# Patient Record
Sex: FEMALE | Race: WHITE | NOT HISPANIC OR LATINO | Employment: OTHER
[De-identification: names, ages, dates, MRNs, and addresses within clinical notes are randomized per-mention and may not be internally consistent; named-entity substitution may affect disease eponyms.]

---

## 2017-08-28 ENCOUNTER — PRIOR ORIGINAL RECORDS (OUTPATIENT)
Dept: OTHER | Age: 72
End: 2017-08-28

## 2017-08-28 PROBLEM — K21.9 GASTROESOPHAGEAL REFLUX DISEASE WITHOUT ESOPHAGITIS: Status: ACTIVE | Noted: 2017-08-28

## 2017-08-28 PROCEDURE — 81003 URINALYSIS AUTO W/O SCOPE: CPT | Performed by: INTERNAL MEDICINE

## 2017-11-03 ENCOUNTER — PRIOR ORIGINAL RECORDS (OUTPATIENT)
Dept: OTHER | Age: 72
End: 2017-11-03

## 2017-11-03 ENCOUNTER — MYAURORA ACCOUNT LINK (OUTPATIENT)
Dept: OTHER | Age: 72
End: 2017-11-03

## 2017-11-03 ENCOUNTER — HOSPITAL ENCOUNTER (OUTPATIENT)
Dept: LAB | Facility: HOSPITAL | Age: 72
Discharge: HOME | End: 2017-11-03
Attending: INTERNAL MEDICINE
Payer: MEDICARE

## 2017-11-03 PROCEDURE — 84520 ASSAY OF UREA NITROGEN: CPT | Performed by: INTERNAL MEDICINE

## 2017-11-03 PROCEDURE — 82565 ASSAY OF CREATININE: CPT | Performed by: INTERNAL MEDICINE

## 2017-11-03 PROCEDURE — 36415 COLL VENOUS BLD VENIPUNCTURE: CPT | Performed by: INTERNAL MEDICINE

## 2017-11-06 LAB
BUN: 19 MG/DL
CREATININE, SERUM: 0.9 MG/DL

## 2017-11-21 ENCOUNTER — MYAURORA ACCOUNT LINK (OUTPATIENT)
Dept: OTHER | Age: 72
End: 2017-11-21

## 2017-11-21 ENCOUNTER — HOSPITAL ENCOUNTER (OUTPATIENT)
Dept: CT IMAGING | Facility: HOSPITAL | Age: 72
Discharge: HOME | End: 2017-11-21
Attending: INTERNAL MEDICINE
Payer: MEDICARE

## 2017-11-21 ENCOUNTER — HOSPITAL ENCOUNTER (OUTPATIENT)
Dept: CV DIAGNOSTICS | Facility: HOSPITAL | Age: 72
Discharge: HOME | End: 2017-11-21
Attending: INTERNAL MEDICINE

## 2017-11-21 ENCOUNTER — PRIOR ORIGINAL RECORDS (OUTPATIENT)
Dept: OTHER | Age: 72
End: 2017-11-21

## 2017-11-21 DIAGNOSIS — R06.00 DYSPNEA: ICD-10-CM

## 2017-11-21 DIAGNOSIS — R07.2 CHEST PAIN, PRECORDIAL: ICD-10-CM

## 2017-11-21 DIAGNOSIS — R07.9 CHEST PAIN: ICD-10-CM

## 2017-11-21 DIAGNOSIS — R94.39 ABNORMAL STRESS TEST: ICD-10-CM

## 2017-11-21 DIAGNOSIS — R00.2 PALPITATIONS: ICD-10-CM

## 2017-11-21 DIAGNOSIS — R06.00 DYSPNEA, UNSPECIFIED TYPE: ICD-10-CM

## 2017-11-21 PROCEDURE — 75574 CT ANGIO HRT W/3D IMAGE: CPT | Performed by: INTERNAL MEDICINE

## 2017-11-21 RX ORDER — NITROGLYCERIN 0.4 MG/1
TABLET SUBLINGUAL
Status: COMPLETED
Start: 2017-11-21 | End: 2017-11-21

## 2017-11-21 RX ADMIN — NITROGLYCERIN 0.4 MG: 0.4 TABLET SUBLINGUAL at 13:58:00

## 2017-11-22 LAB
ALBUMIN: 4.2 G/DL
ALKALINE PHOSPHATATE(ALK PHOS): 55 IU/L
BILIRUBIN TOTAL: 0.91 MG/DL
BUN: 15 MG/DL
CALCIUM: 9.7 MG/DL
CHLORIDE: 98 MEQ/L
CHOLESTEROL, TOTAL: 205 MG/DL
CREATININE KINASE: 87 U/L
CREATININE, SERUM: 0.83 MG/DL
GLUCOSE: 92 MG/DL
HDL CHOLESTEROL: 57 MG/DL
HEMATOCRIT: 41.7 %
HEMOGLOBIN: 13.3 G/DL
LDL CHOLESTEROL: 136 MG/DL
PLATELETS: 256 K/UL
POTASSIUM, SERUM: 4.6 MEQ/L
PROTEIN, TOTAL: 6.8 G/DL
RED BLOOD COUNT: 4.29 X 10-6/U
SGOT (AST): 21 IU/L
SGPT (ALT): 23 IU/L
SODIUM: 135 MEQ/L
THYROID STIMULATING HORMONE: 1.58 MLU/L
TRIGLYCERIDES: 60 MG/DL
WHITE BLOOD COUNT: 6.23 X 10-3/U

## 2017-11-28 ENCOUNTER — PRIOR ORIGINAL RECORDS (OUTPATIENT)
Dept: OTHER | Age: 72
End: 2017-11-28

## 2017-11-28 LAB — UFCT: 74 CA SCORE

## 2017-12-01 ENCOUNTER — PRIOR ORIGINAL RECORDS (OUTPATIENT)
Dept: OTHER | Age: 72
End: 2017-12-01

## 2017-12-21 ENCOUNTER — PRIOR ORIGINAL RECORDS (OUTPATIENT)
Dept: OTHER | Age: 72
End: 2017-12-21

## 2017-12-28 ENCOUNTER — PRIOR ORIGINAL RECORDS (OUTPATIENT)
Dept: OTHER | Age: 72
End: 2017-12-28

## 2018-05-24 ENCOUNTER — MYAURORA ACCOUNT LINK (OUTPATIENT)
Dept: OTHER | Age: 73
End: 2018-05-24

## 2018-05-24 ENCOUNTER — PRIOR ORIGINAL RECORDS (OUTPATIENT)
Dept: OTHER | Age: 73
End: 2018-05-24

## 2018-05-29 LAB
ALBUMIN: 4.5 G/DL
ALKALINE PHOSPHATATE(ALK PHOS): 62 IU/L
ALT (SGPT): 27 U/L
AST (SGOT): 23 U/L
BILIRUBIN TOTAL: 0.73 MG/DL
BUN: 18 MG/DL
CALCIUM: 9.7 MG/DL
CHLORIDE: 95 MEQ/L
CHOLESTEROL, TOTAL: 175 MG/DL
CREATININE, SERUM: 0.94 MG/DL
GLUCOSE: 89 MG/DL
HDL CHOLESTEROL: 56 MG/DL
LDL CHOLESTEROL: 105 MG/DL
POTASSIUM, SERUM: 4.3 MEQ/L
POTASSIUM, SERUM: 5.6 MEQ/L
PROTEIN, TOTAL: 7.3 G/DL
SODIUM: 134 MEQ/L
TRIGLYCERIDES: 68 MG/DL

## 2018-08-29 PROBLEM — I51.89 DIASTOLIC DYSFUNCTION: Status: ACTIVE | Noted: 2018-08-29

## 2018-08-29 PROCEDURE — 81003 URINALYSIS AUTO W/O SCOPE: CPT | Performed by: INTERNAL MEDICINE

## 2019-02-28 VITALS
RESPIRATION RATE: 16 BRPM | BODY MASS INDEX: 24.66 KG/M2 | HEIGHT: 62 IN | HEART RATE: 68 BPM | SYSTOLIC BLOOD PRESSURE: 128 MMHG | WEIGHT: 134 LBS | DIASTOLIC BLOOD PRESSURE: 80 MMHG

## 2019-02-28 VITALS
HEART RATE: 69 BPM | WEIGHT: 138 LBS | HEIGHT: 62 IN | BODY MASS INDEX: 25.4 KG/M2 | DIASTOLIC BLOOD PRESSURE: 78 MMHG | SYSTOLIC BLOOD PRESSURE: 138 MMHG

## 2019-03-29 RX ORDER — METOPROLOL SUCCINATE 50 MG/1
50 TABLET, EXTENDED RELEASE ORAL DAILY
COMMUNITY
Start: 2017-03-11

## 2019-03-29 RX ORDER — FUROSEMIDE 20 MG/1
20 TABLET ORAL
COMMUNITY
Start: 2018-05-24

## 2019-03-29 RX ORDER — LISINOPRIL 20 MG/1
20 TABLET ORAL DAILY
COMMUNITY
Start: 2017-03-11

## 2019-03-29 RX ORDER — OMEPRAZOLE 20 MG/1
1 CAPSULE, DELAYED RELEASE ORAL DAILY PRN
COMMUNITY
Start: 2017-03-11 | End: 2021-12-13

## 2019-03-29 RX ORDER — LOVASTATIN 10 MG/1
2 TABLET ORAL DAILY
COMMUNITY
Start: 2017-03-11 | End: 2020-05-27 | Stop reason: CLARIF

## 2019-05-23 ENCOUNTER — ANCILLARY PROCEDURE (OUTPATIENT)
Dept: CARDIOLOGY | Age: 74
End: 2019-05-23
Attending: INTERNAL MEDICINE

## 2019-05-23 DIAGNOSIS — R06.00 DYSPNEA: ICD-10-CM

## 2019-05-23 PROBLEM — R94.39 ABNORMAL CARDIOVASCULAR STRESS TEST: Status: ACTIVE | Noted: 2017-11-03

## 2019-05-23 PROBLEM — I10 ESSENTIAL HYPERTENSION: Status: ACTIVE | Noted: 2017-11-03

## 2019-05-23 PROBLEM — I50.32 CHRONIC CONGESTIVE HEART FAILURE WITH LEFT VENTRICULAR DIASTOLIC DYSFUNCTION (CMD): Status: ACTIVE | Noted: 2018-05-24

## 2019-05-23 PROBLEM — E78.2 HYPERLIPIDEMIA, MIXED: Status: ACTIVE | Noted: 2017-11-03

## 2019-05-23 PROBLEM — I27.20 PULMONARY HTN (CMD): Status: ACTIVE | Noted: 2018-05-24

## 2019-05-23 PROBLEM — R07.2 CHEST PAIN, PRECORDIAL: Status: ACTIVE | Noted: 2017-11-03

## 2019-05-23 PROBLEM — I25.10 CAD IN NATIVE ARTERY: Status: ACTIVE | Noted: 2018-05-24

## 2019-05-23 PROBLEM — Z82.49 FAMILY HISTORY OF CORONARY ARTERIOSCLEROSIS: Status: ACTIVE | Noted: 2017-11-03

## 2019-05-23 PROBLEM — R00.2 PALPITATIONS: Status: ACTIVE | Noted: 2017-11-03

## 2019-05-23 PROCEDURE — 93306 TTE W/DOPPLER COMPLETE: CPT | Performed by: INTERNAL MEDICINE

## 2019-05-23 ASSESSMENT — ENCOUNTER SYMPTOMS
HEMOPTYSIS: 0
HEMATOCHEZIA: 0
WEIGHT LOSS: 0
SUSPICIOUS LESIONS: 0
WEIGHT GAIN: 0
ALLERGIC/IMMUNOLOGIC COMMENTS: NO NEW FOOD ALLERGIES
BRUISES/BLEEDS EASILY: 0
FEVER: 0
COUGH: 0
CHILLS: 0

## 2019-05-24 ENCOUNTER — OFFICE VISIT (OUTPATIENT)
Dept: CARDIOLOGY | Age: 74
End: 2019-05-24

## 2019-05-24 VITALS
HEIGHT: 61 IN | HEART RATE: 62 BPM | BODY MASS INDEX: 26.43 KG/M2 | SYSTOLIC BLOOD PRESSURE: 132 MMHG | DIASTOLIC BLOOD PRESSURE: 74 MMHG | WEIGHT: 140 LBS

## 2019-05-24 DIAGNOSIS — I25.10 CAD IN NATIVE ARTERY: ICD-10-CM

## 2019-05-24 DIAGNOSIS — I10 ESSENTIAL HYPERTENSION: ICD-10-CM

## 2019-05-24 DIAGNOSIS — I50.32 CHRONIC CONGESTIVE HEART FAILURE WITH LEFT VENTRICULAR DIASTOLIC DYSFUNCTION (CMD): ICD-10-CM

## 2019-05-24 DIAGNOSIS — R07.2 CHEST PAIN, PRECORDIAL: ICD-10-CM

## 2019-05-24 DIAGNOSIS — E78.2 HYPERLIPIDEMIA, MIXED: ICD-10-CM

## 2019-05-24 DIAGNOSIS — I27.20 PULMONARY HTN (CMD): Primary | ICD-10-CM

## 2019-05-24 PROCEDURE — 99214 OFFICE O/P EST MOD 30 MIN: CPT | Performed by: INTERNAL MEDICINE

## 2019-05-24 SDOH — HEALTH STABILITY: PHYSICAL HEALTH: ON AVERAGE, HOW MANY DAYS PER WEEK DO YOU ENGAGE IN MODERATE TO STRENUOUS EXERCISE (LIKE A BRISK WALK)?: 7 DAYS

## 2019-05-24 SDOH — HEALTH STABILITY: PHYSICAL HEALTH: ON AVERAGE, HOW MANY MINUTES DO YOU ENGAGE IN EXERCISE AT THIS LEVEL?: 60 MIN

## 2019-05-24 SDOH — HEALTH STABILITY: MENTAL HEALTH
STRESS IS WHEN SOMEONE FEELS TENSE, NERVOUS, ANXIOUS, OR CAN'T SLEEP AT NIGHT BECAUSE THEIR MIND IS TROUBLED. HOW STRESSED ARE YOU?: NOT AT ALL

## 2019-05-24 ASSESSMENT — ENCOUNTER SYMPTOMS: HEARTBURN: 1

## 2019-08-30 PROBLEM — R94.39 ABNORMAL CARDIOVASCULAR STRESS TEST: Status: ACTIVE | Noted: 2017-11-03

## 2019-08-30 PROBLEM — I50.32 CHRONIC CONGESTIVE HEART FAILURE WITH LEFT VENTRICULAR DIASTOLIC DYSFUNCTION (HCC): Status: ACTIVE | Noted: 2018-05-24

## 2019-08-30 PROBLEM — I27.20 PULMONARY HYPERTENSION (HCC): Status: ACTIVE | Noted: 2017-11-30

## 2019-08-30 PROBLEM — I25.10 CAD IN NATIVE ARTERY: Status: ACTIVE | Noted: 2018-05-24

## 2019-08-30 PROCEDURE — 81003 URINALYSIS AUTO W/O SCOPE: CPT | Performed by: INTERNAL MEDICINE

## 2020-05-12 ENCOUNTER — E-ADVICE (OUTPATIENT)
Dept: CARDIOLOGY | Age: 75
End: 2020-05-12

## 2020-05-12 ENCOUNTER — TELEPHONE (OUTPATIENT)
Dept: CARDIOLOGY | Age: 75
End: 2020-05-12

## 2020-05-26 ENCOUNTER — APPOINTMENT (OUTPATIENT)
Dept: CARDIOLOGY | Age: 75
End: 2020-05-26

## 2020-05-27 ENCOUNTER — TELEPHONE (OUTPATIENT)
Dept: CARDIOLOGY | Age: 75
End: 2020-05-27

## 2020-05-27 RX ORDER — LOVASTATIN 40 MG/1
20 TABLET ORAL DAILY
COMMUNITY
Start: 2020-04-28 | End: 2020-08-05 | Stop reason: DRUGHIGH

## 2020-05-28 ENCOUNTER — V-VISIT (OUTPATIENT)
Dept: CARDIOLOGY | Age: 75
End: 2020-05-28

## 2020-05-28 VITALS
DIASTOLIC BLOOD PRESSURE: 67 MMHG | SYSTOLIC BLOOD PRESSURE: 129 MMHG | BODY MASS INDEX: 25.86 KG/M2 | HEIGHT: 61 IN | HEART RATE: 61 BPM | WEIGHT: 137 LBS

## 2020-05-28 DIAGNOSIS — Z82.49 FAMILY HISTORY OF CORONARY ARTERIOSCLEROSIS: ICD-10-CM

## 2020-05-28 DIAGNOSIS — R06.02 SHORTNESS OF BREATH: ICD-10-CM

## 2020-05-28 DIAGNOSIS — R07.2 CHEST PAIN, PRECORDIAL: Primary | ICD-10-CM

## 2020-05-28 DIAGNOSIS — I50.32 CHRONIC CONGESTIVE HEART FAILURE WITH LEFT VENTRICULAR DIASTOLIC DYSFUNCTION (CMD): ICD-10-CM

## 2020-05-28 DIAGNOSIS — I10 ESSENTIAL HYPERTENSION: ICD-10-CM

## 2020-05-28 DIAGNOSIS — R94.39 ABNORMAL CARDIOVASCULAR STRESS TEST: ICD-10-CM

## 2020-05-28 DIAGNOSIS — E78.2 HYPERLIPIDEMIA, MIXED: ICD-10-CM

## 2020-05-28 DIAGNOSIS — I27.20 PULMONARY HTN (CMD): ICD-10-CM

## 2020-05-28 PROCEDURE — 99214 OFFICE O/P EST MOD 30 MIN: CPT | Performed by: INTERNAL MEDICINE

## 2020-05-28 SDOH — HEALTH STABILITY: PHYSICAL HEALTH: ON AVERAGE, HOW MANY DAYS PER WEEK DO YOU ENGAGE IN MODERATE TO STRENUOUS EXERCISE (LIKE A BRISK WALK)?: 7 DAYS

## 2020-05-28 SDOH — HEALTH STABILITY: PHYSICAL HEALTH: ON AVERAGE, HOW MANY MINUTES DO YOU ENGAGE IN EXERCISE AT THIS LEVEL?: 60 MIN

## 2020-06-23 DIAGNOSIS — Z01.812 PRE-PROCEDURAL LABORATORY EXAMINATION: Primary | ICD-10-CM

## 2020-07-31 ENCOUNTER — LAB SERVICES (OUTPATIENT)
Dept: LAB | Age: 75
End: 2020-07-31

## 2020-07-31 DIAGNOSIS — I27.20 PULMONARY HTN (CMD): ICD-10-CM

## 2020-07-31 DIAGNOSIS — R94.39 ABNORMAL CARDIOVASCULAR STRESS TEST: ICD-10-CM

## 2020-07-31 DIAGNOSIS — E78.2 HYPERLIPIDEMIA, MIXED: ICD-10-CM

## 2020-07-31 DIAGNOSIS — Z82.49 FAMILY HISTORY OF CORONARY ARTERIOSCLEROSIS: ICD-10-CM

## 2020-07-31 DIAGNOSIS — R06.02 SHORTNESS OF BREATH: ICD-10-CM

## 2020-07-31 DIAGNOSIS — I10 ESSENTIAL HYPERTENSION: ICD-10-CM

## 2020-07-31 LAB
ALBUMIN SERPL-MCNC: 4.4 G/DL (ref 3.6–5.1)
ALBUMIN/GLOB SERPL: 1.5 {RATIO} (ref 1–2.4)
ALP SERPL-CCNC: 93 UNITS/L (ref 45–117)
ALT SERPL-CCNC: 23 UNITS/L
ANION GAP SERPL CALC-SCNC: 10 MMOL/L (ref 10–20)
AST SERPL-CCNC: 24 UNITS/L
BASOPHILS # BLD: 0 K/MCL (ref 0–0.3)
BASOPHILS NFR BLD: 1 %
BILIRUB SERPL-MCNC: 0.8 MG/DL (ref 0.2–1)
BUN SERPL-MCNC: 16 MG/DL (ref 6–20)
BUN/CREAT SERPL: 18 (ref 7–25)
CALCIUM SERPL-MCNC: 9.4 MG/DL (ref 8.4–10.2)
CHLORIDE SERPL-SCNC: 103 MMOL/L (ref 98–107)
CHOLEST SERPL-MCNC: 202 MG/DL
CHOLEST/HDLC SERPL: 4 {RATIO}
CO2 SERPL-SCNC: 28 MMOL/L (ref 21–32)
CREAT SERPL-MCNC: 0.9 MG/DL (ref 0.51–0.95)
DIFFERENTIAL METHOD BLD: NORMAL
EOSINOPHIL # BLD: 0.3 K/MCL (ref 0.1–0.5)
EOSINOPHIL NFR BLD: 5 %
ERYTHROCYTE [DISTWIDTH] IN BLOOD: 12.6 % (ref 11–15)
GLOBULIN SER-MCNC: 2.9 G/DL (ref 2–4)
GLUCOSE SERPL-MCNC: 92 MG/DL (ref 65–99)
HCT VFR BLD CALC: 41.3 % (ref 36–46.5)
HDLC SERPL-MCNC: 50 MG/DL
HGB BLD-MCNC: 13.8 G/DL (ref 12–15.5)
IMM GRANULOCYTES # BLD AUTO: 0 K/MCL (ref 0–0.2)
IMM GRANULOCYTES NFR BLD: 0 %
LDLC SERPL CALC-MCNC: 130 MG/DL
LENGTH OF FAST TIME PATIENT: 12 HRS
LENGTH OF FAST TIME PATIENT: 12 HRS
LYMPHOCYTES # BLD: 1.8 K/MCL (ref 1–4)
LYMPHOCYTES NFR BLD: 28 %
MCH RBC QN AUTO: 31.7 PG (ref 26–34)
MCHC RBC AUTO-ENTMCNC: 33.4 G/DL (ref 32–36.5)
MCV RBC AUTO: 94.9 FL (ref 78–100)
MONOCYTES # BLD: 0.4 K/MCL (ref 0.3–0.9)
MONOCYTES NFR BLD: 7 %
NEUTROPHILS # BLD: 3.9 K/MCL (ref 1.8–7.7)
NEUTROPHILS NFR BLD: 59 %
NONHDLC SERPL-MCNC: 152 MG/DL
NRBC BLD MANUAL-RTO: 0 /100 WBC
PLATELET # BLD: 250 K/MCL (ref 140–450)
POTASSIUM SERPL-SCNC: 4.5 MMOL/L (ref 3.4–5.1)
PROT SERPL-MCNC: 7.3 G/DL (ref 6.4–8.2)
RBC # BLD: 4.35 MIL/MCL (ref 4–5.2)
SODIUM SERPL-SCNC: 136 MMOL/L (ref 135–145)
T4 FREE SERPL-MCNC: 1 NG/DL (ref 0.8–1.5)
TRIGL SERPL-MCNC: 109 MG/DL
TSH SERPL-ACNC: 1.75 MCUNITS/ML (ref 0.35–5)
WBC # BLD: 6.5 K/MCL (ref 4.2–11)

## 2020-08-04 ENCOUNTER — TELEPHONE (OUTPATIENT)
Dept: CARDIOLOGY | Age: 75
End: 2020-08-04

## 2020-08-05 ENCOUNTER — TELEPHONE (OUTPATIENT)
Dept: CARDIOLOGY | Age: 75
End: 2020-08-05

## 2020-08-05 DIAGNOSIS — I25.10 CAD IN NATIVE ARTERY: Primary | ICD-10-CM

## 2020-08-05 DIAGNOSIS — E78.2 HYPERLIPIDEMIA, MIXED: ICD-10-CM

## 2020-08-05 RX ORDER — EZETIMIBE 10 MG/1
10 TABLET ORAL DAILY
Qty: 90 TABLET | Refills: 3 | Status: SHIPPED | OUTPATIENT
Start: 2020-08-05 | End: 2021-07-12

## 2020-08-05 RX ORDER — LOVASTATIN 20 MG/1
20 TABLET ORAL NIGHTLY
Qty: 90 TABLET | Refills: 3 | Status: SHIPPED | OUTPATIENT
Start: 2020-08-05 | End: 2020-10-27 | Stop reason: CLARIF

## 2020-08-13 PROBLEM — I71.40 AAA (ABDOMINAL AORTIC ANEURYSM): Status: ACTIVE | Noted: 2020-08-13

## 2020-08-13 PROBLEM — I71.40 AAA (ABDOMINAL AORTIC ANEURYSM) (HCC): Status: ACTIVE | Noted: 2020-08-13

## 2020-08-13 PROBLEM — I71.4 AAA (ABDOMINAL AORTIC ANEURYSM) (HCC): Status: ACTIVE | Noted: 2020-08-13

## 2020-08-14 ENCOUNTER — APPOINTMENT (OUTPATIENT)
Dept: CARDIOLOGY | Age: 75
End: 2020-08-14
Attending: INTERNAL MEDICINE

## 2020-08-21 ENCOUNTER — TELEPHONE (OUTPATIENT)
Dept: CARDIOLOGY | Age: 75
End: 2020-08-21

## 2020-08-31 ENCOUNTER — OFFICE VISIT (OUTPATIENT)
Dept: CARDIOLOGY | Age: 75
End: 2020-08-31

## 2020-08-31 ENCOUNTER — APPOINTMENT (OUTPATIENT)
Dept: CARDIOLOGY | Age: 75
End: 2020-08-31

## 2020-08-31 VITALS
SYSTOLIC BLOOD PRESSURE: 130 MMHG | DIASTOLIC BLOOD PRESSURE: 70 MMHG | WEIGHT: 138 LBS | BODY MASS INDEX: 26.07 KG/M2 | HEART RATE: 60 BPM

## 2020-08-31 DIAGNOSIS — R06.02 SHORTNESS OF BREATH: Primary | ICD-10-CM

## 2020-08-31 DIAGNOSIS — R00.2 PALPITATIONS: ICD-10-CM

## 2020-08-31 DIAGNOSIS — I50.32 CHRONIC CONGESTIVE HEART FAILURE WITH LEFT VENTRICULAR DIASTOLIC DYSFUNCTION (CMD): ICD-10-CM

## 2020-08-31 DIAGNOSIS — E78.2 HYPERLIPIDEMIA, MIXED: ICD-10-CM

## 2020-08-31 DIAGNOSIS — R94.39 ABNORMAL CARDIOVASCULAR STRESS TEST: ICD-10-CM

## 2020-08-31 DIAGNOSIS — I73.9 CLAUDICATION (CMD): ICD-10-CM

## 2020-08-31 PROCEDURE — 99214 OFFICE O/P EST MOD 30 MIN: CPT | Performed by: INTERNAL MEDICINE

## 2020-08-31 SDOH — HEALTH STABILITY: PHYSICAL HEALTH: ON AVERAGE, HOW MANY DAYS PER WEEK DO YOU ENGAGE IN MODERATE TO STRENUOUS EXERCISE (LIKE A BRISK WALK)?: 7 DAYS

## 2020-08-31 SDOH — HEALTH STABILITY: PHYSICAL HEALTH: ON AVERAGE, HOW MANY MINUTES DO YOU ENGAGE IN EXERCISE AT THIS LEVEL?: 60 MIN

## 2020-08-31 ASSESSMENT — PATIENT HEALTH QUESTIONNAIRE - PHQ9
SUM OF ALL RESPONSES TO PHQ9 QUESTIONS 1 AND 2: 0
CLINICAL INTERPRETATION OF PHQ2 SCORE: NO FURTHER SCREENING NEEDED
SUM OF ALL RESPONSES TO PHQ9 QUESTIONS 1 AND 2: 0
2. FEELING DOWN, DEPRESSED OR HOPELESS: NOT AT ALL
CLINICAL INTERPRETATION OF PHQ9 SCORE: NO FURTHER SCREENING NEEDED
1. LITTLE INTEREST OR PLEASURE IN DOING THINGS: NOT AT ALL

## 2020-09-03 PROBLEM — I73.9 PVD (PERIPHERAL VASCULAR DISEASE) (HCC): Status: ACTIVE | Noted: 2020-09-03

## 2020-09-03 PROBLEM — I73.9 PVD (PERIPHERAL VASCULAR DISEASE): Status: ACTIVE | Noted: 2020-09-03

## 2020-09-03 PROBLEM — I73.9 CLAUDICATION (CMD): Status: ACTIVE | Noted: 2020-09-03

## 2020-09-04 ENCOUNTER — TELEPHONE (OUTPATIENT)
Dept: CARDIOLOGY | Age: 75
End: 2020-09-04

## 2020-09-04 ENCOUNTER — PREP FOR CASE (OUTPATIENT)
Dept: CARDIOLOGY | Age: 75
End: 2020-09-04

## 2020-09-04 DIAGNOSIS — I73.9 CLAUDICATION (CMD): Primary | ICD-10-CM

## 2020-09-04 DIAGNOSIS — I70.211 ATHEROSCLEROSIS OF NATIVE ARTERY OF RIGHT LOWER EXTREMITY WITH INTERMITTENT CLAUDICATION (CMD): ICD-10-CM

## 2020-09-04 RX ORDER — SODIUM CHLORIDE 9 MG/ML
INJECTION, SOLUTION INTRAVENOUS CONTINUOUS
Status: CANCELLED | OUTPATIENT
Start: 2020-09-04

## 2020-09-04 RX ORDER — ASPIRIN 81 MG/1
81 TABLET, CHEWABLE ORAL DAILY
Status: CANCELLED | OUTPATIENT
Start: 2020-09-04

## 2020-09-09 DIAGNOSIS — Z01.812 PRE-PROCEDURE LAB EXAM: Primary | ICD-10-CM

## 2020-09-11 ENCOUNTER — TELEPHONE (OUTPATIENT)
Dept: CARDIOLOGY | Age: 75
End: 2020-09-11

## 2020-09-13 ENCOUNTER — APPOINTMENT (OUTPATIENT)
Dept: LAB | Age: 75
End: 2020-09-13

## 2020-09-13 ENCOUNTER — LAB SERVICES (OUTPATIENT)
Dept: LAB | Age: 75
End: 2020-09-13

## 2020-09-13 DIAGNOSIS — Z01.812 PRE-PROCEDURE LAB EXAM: ICD-10-CM

## 2020-09-14 ENCOUNTER — TELEPHONE (OUTPATIENT)
Dept: CARDIOLOGY | Age: 75
End: 2020-09-14

## 2020-09-14 LAB
SARS-COV-2 RNA RESP QL NAA+PROBE: NOT DETECTED
SERVICE CMNT-IMP: NORMAL
SPECIMEN SOURCE: NORMAL

## 2020-09-15 ENCOUNTER — HOSPITAL ENCOUNTER (OUTPATIENT)
Age: 75
Discharge: HOME | End: 2020-09-15
Attending: INTERNAL MEDICINE | Admitting: INTERNAL MEDICINE

## 2020-09-15 VITALS
HEART RATE: 53 BPM | OXYGEN SATURATION: 97 % | DIASTOLIC BLOOD PRESSURE: 60 MMHG | SYSTOLIC BLOOD PRESSURE: 144 MMHG | BODY MASS INDEX: 26.39 KG/M2 | TEMPERATURE: 99.1 F | RESPIRATION RATE: 16 BRPM | WEIGHT: 139.77 LBS | HEIGHT: 61 IN

## 2020-09-15 DIAGNOSIS — I73.9 CLAUDICATION (CMD): ICD-10-CM

## 2020-09-15 DIAGNOSIS — I70.211 ATHEROSCLEROSIS OF NATIVE ARTERY OF RIGHT LOWER EXTREMITY WITH INTERMITTENT CLAUDICATION (CMD): ICD-10-CM

## 2020-09-15 LAB
ANION GAP SERPL CALC-SCNC: 9 MMOL/L (ref 10–20)
BUN SERPL-MCNC: 13 MG/DL (ref 6–20)
BUN/CREAT SERPL: 15 (ref 7–25)
CALCIUM SERPL-MCNC: 9.3 MG/DL (ref 8.4–10.2)
CHLORIDE SERPL-SCNC: 105 MMOL/L (ref 98–107)
CO2 SERPL-SCNC: 27 MMOL/L (ref 21–32)
CREAT SERPL-MCNC: 0.88 MG/DL (ref 0.51–0.95)
ERYTHROCYTE [DISTWIDTH] IN BLOOD: 12.7 % (ref 11–15)
GLUCOSE SERPL-MCNC: 93 MG/DL (ref 65–99)
HCT VFR BLD CALC: 39.7 % (ref 36–46.5)
HGB BLD-MCNC: 13.2 G/DL (ref 12–15.5)
INR PPP: 1
MCH RBC QN AUTO: 31.8 PG (ref 26–34)
MCHC RBC AUTO-ENTMCNC: 33.2 G/DL (ref 32–36.5)
MCV RBC AUTO: 95.7 FL (ref 78–100)
NRBC BLD MANUAL-RTO: 0 /100 WBC
PLATELET # BLD: 247 K/MCL (ref 140–450)
POTASSIUM SERPL-SCNC: 4 MMOL/L (ref 3.4–5.1)
PROTHROMBIN TIME: 10.3 SEC (ref 9.7–11.8)
RBC # BLD: 4.15 MIL/MCL (ref 4–5.2)
SODIUM SERPL-SCNC: 137 MMOL/L (ref 135–145)
WBC # BLD: 7.7 K/MCL (ref 4.2–11)

## 2020-09-15 PROCEDURE — 36200 PLACE CATHETER IN AORTA: CPT | Performed by: INTERNAL MEDICINE

## 2020-09-15 PROCEDURE — 75710 ARTERY X-RAYS ARM/LEG: CPT | Performed by: INTERNAL MEDICINE

## 2020-09-15 PROCEDURE — C1769 GUIDE WIRE: HCPCS | Performed by: INTERNAL MEDICINE

## 2020-09-15 PROCEDURE — 10006023 HB SUPPLY 272: Performed by: INTERNAL MEDICINE

## 2020-09-15 PROCEDURE — 13000001 HB PHASE II RECOVERY EA 30 MINUTES: Performed by: INTERNAL MEDICINE

## 2020-09-15 PROCEDURE — 99152 MOD SED SAME PHYS/QHP 5/>YRS: CPT | Performed by: INTERNAL MEDICINE

## 2020-09-15 PROCEDURE — 10002805 HB CONTRAST AGENT: Performed by: INTERNAL MEDICINE

## 2020-09-15 PROCEDURE — C1894 INTRO/SHEATH, NON-LASER: HCPCS | Performed by: INTERNAL MEDICINE

## 2020-09-15 PROCEDURE — C1887 CATHETER, GUIDING: HCPCS | Performed by: INTERNAL MEDICINE

## 2020-09-15 PROCEDURE — C1760 CLOSURE DEV, VASC: HCPCS | Performed by: INTERNAL MEDICINE

## 2020-09-15 PROCEDURE — 80048 BASIC METABOLIC PNL TOTAL CA: CPT

## 2020-09-15 PROCEDURE — 10002800 HB RX 250 W HCPCS: Performed by: INTERNAL MEDICINE

## 2020-09-15 PROCEDURE — 10002801 HB RX 250 W/O HCPCS: Performed by: INTERNAL MEDICINE

## 2020-09-15 PROCEDURE — 75630 X-RAY AORTA LEG ARTERIES: CPT | Performed by: INTERNAL MEDICINE

## 2020-09-15 PROCEDURE — 99153 MOD SED SAME PHYS/QHP EA: CPT | Performed by: INTERNAL MEDICINE

## 2020-09-15 PROCEDURE — 36247 INS CATH ABD/L-EXT ART 3RD: CPT | Performed by: INTERNAL MEDICINE

## 2020-09-15 PROCEDURE — 10006027 HB SUPPLY 278: Performed by: INTERNAL MEDICINE

## 2020-09-15 PROCEDURE — 10002800 HB RX 250 W HCPCS: Performed by: NURSE PRACTITIONER

## 2020-09-15 PROCEDURE — G0269 OCCLUSIVE DEVICE IN VEIN ART: HCPCS | Performed by: INTERNAL MEDICINE

## 2020-09-15 PROCEDURE — 85027 COMPLETE CBC AUTOMATED: CPT

## 2020-09-15 PROCEDURE — 85610 PROTHROMBIN TIME: CPT

## 2020-09-15 PROCEDURE — 36415 COLL VENOUS BLD VENIPUNCTURE: CPT

## 2020-09-15 DEVICE — MYNXGRIP 5F
Type: IMPLANTABLE DEVICE | Site: GROIN | Status: FUNCTIONAL
Brand: MYNXGRIP

## 2020-09-15 RX ORDER — IODIXANOL 320 MG/ML
INJECTION, SOLUTION INTRAVASCULAR PRN
Status: DISCONTINUED | OUTPATIENT
Start: 2020-09-15 | End: 2020-09-15 | Stop reason: HOSPADM

## 2020-09-15 RX ORDER — 0.9 % SODIUM CHLORIDE 0.9 %
2 VIAL (ML) INJECTION EVERY 12 HOURS SCHEDULED
Status: DISCONTINUED | OUTPATIENT
Start: 2020-09-15 | End: 2020-09-15 | Stop reason: HOSPADM

## 2020-09-15 RX ORDER — ONDANSETRON 2 MG/ML
4 INJECTION INTRAMUSCULAR; INTRAVENOUS EVERY 12 HOURS PRN
Status: DISCONTINUED | OUTPATIENT
Start: 2020-09-15 | End: 2020-09-15 | Stop reason: HOSPADM

## 2020-09-15 RX ORDER — SODIUM CHLORIDE 9 MG/ML
INJECTION, SOLUTION INTRAVENOUS CONTINUOUS
Status: DISCONTINUED | OUTPATIENT
Start: 2020-09-15 | End: 2020-09-15 | Stop reason: HOSPADM

## 2020-09-15 RX ORDER — LIDOCAINE HYDROCHLORIDE 10 MG/ML
INJECTION, SOLUTION EPIDURAL; INFILTRATION; INTRACAUDAL; PERINEURAL PRN
Status: DISCONTINUED | OUTPATIENT
Start: 2020-09-15 | End: 2020-09-15 | Stop reason: HOSPADM

## 2020-09-15 RX ORDER — SODIUM CHLORIDE 9 MG/ML
INJECTION, SOLUTION INTRAVENOUS
Status: DISPENSED
Start: 2020-09-15 | End: 2020-09-15

## 2020-09-15 RX ORDER — ASPIRIN 81 MG/1
81 TABLET, CHEWABLE ORAL DAILY
Status: DISCONTINUED | OUTPATIENT
Start: 2020-09-15 | End: 2020-09-15 | Stop reason: HOSPADM

## 2020-09-15 RX ORDER — MIDAZOLAM HYDROCHLORIDE 1 MG/ML
INJECTION, SOLUTION INTRAMUSCULAR; INTRAVENOUS PRN
Status: DISCONTINUED | OUTPATIENT
Start: 2020-09-15 | End: 2020-09-15 | Stop reason: HOSPADM

## 2020-09-15 RX ADMIN — ONDANSETRON 4 MG: 2 INJECTION, SOLUTION INTRAMUSCULAR; INTRAVENOUS at 17:26

## 2020-09-15 ASSESSMENT — PAIN SCALES - GENERAL
PAINLEVEL_OUTOF10: 0

## 2020-09-17 ENCOUNTER — TELEPHONE (OUTPATIENT)
Dept: CARDIOLOGY | Age: 75
End: 2020-09-17

## 2020-09-21 ENCOUNTER — PREP FOR CASE (OUTPATIENT)
Dept: CARDIOLOGY | Age: 75
End: 2020-09-21

## 2020-09-21 DIAGNOSIS — I70.213 ATHEROSCLEROSIS OF NATIVE ARTERY OF BOTH LOWER EXTREMITIES WITH INTERMITTENT CLAUDICATION (CMD): ICD-10-CM

## 2020-09-21 DIAGNOSIS — I73.9 CLAUDICATION (CMD): Primary | ICD-10-CM

## 2020-09-21 RX ORDER — SODIUM CHLORIDE 9 MG/ML
INJECTION, SOLUTION INTRAVENOUS CONTINUOUS
Status: CANCELLED | OUTPATIENT
Start: 2020-09-21

## 2020-09-21 RX ORDER — ASPIRIN 81 MG/1
81 TABLET, CHEWABLE ORAL DAILY
Status: CANCELLED | OUTPATIENT
Start: 2020-09-21

## 2020-10-01 DIAGNOSIS — Z01.812 PRE-PROCEDURE LAB EXAM: Primary | ICD-10-CM

## 2020-10-02 ENCOUNTER — TELEPHONE (OUTPATIENT)
Dept: CARDIOLOGY | Age: 75
End: 2020-10-02

## 2020-10-04 ENCOUNTER — LAB SERVICES (OUTPATIENT)
Dept: LAB | Age: 75
End: 2020-10-04

## 2020-10-04 DIAGNOSIS — Z01.812 PRE-PROCEDURE LAB EXAM: ICD-10-CM

## 2020-10-05 ENCOUNTER — TELEPHONE (OUTPATIENT)
Dept: CARDIOLOGY | Age: 75
End: 2020-10-05

## 2020-10-05 ENCOUNTER — OFFICE VISIT (OUTPATIENT)
Dept: CARDIOLOGY | Age: 75
End: 2020-10-05

## 2020-10-05 VITALS
WEIGHT: 133 LBS | DIASTOLIC BLOOD PRESSURE: 72 MMHG | HEART RATE: 61 BPM | SYSTOLIC BLOOD PRESSURE: 135 MMHG | BODY MASS INDEX: 24.48 KG/M2 | HEIGHT: 62 IN

## 2020-10-05 DIAGNOSIS — R00.2 PALPITATIONS: ICD-10-CM

## 2020-10-05 DIAGNOSIS — I73.9 CLAUDICATION (CMD): ICD-10-CM

## 2020-10-05 DIAGNOSIS — I10 ESSENTIAL HYPERTENSION: ICD-10-CM

## 2020-10-05 DIAGNOSIS — R94.39 ABNORMAL CARDIOVASCULAR STRESS TEST: Primary | ICD-10-CM

## 2020-10-05 LAB
SARS-COV-2 RNA RESP QL NAA+PROBE: NOT DETECTED
SERVICE CMNT-IMP: NORMAL
SPECIMEN SOURCE: NORMAL

## 2020-10-05 PROCEDURE — 99442 TELEPHONE E&M BY PHYSICIAN EST PT NOT ORIG PREV 7 DAYS 11-20 MIN: CPT | Performed by: INTERNAL MEDICINE

## 2020-10-05 RX ORDER — ACETAMINOPHEN 160 MG
50 TABLET,DISINTEGRATING ORAL DAILY
COMMUNITY

## 2020-10-05 SDOH — HEALTH STABILITY: PHYSICAL HEALTH: ON AVERAGE, HOW MANY MINUTES DO YOU ENGAGE IN EXERCISE AT THIS LEVEL?: 60 MIN

## 2020-10-05 SDOH — HEALTH STABILITY: PHYSICAL HEALTH: ON AVERAGE, HOW MANY DAYS PER WEEK DO YOU ENGAGE IN MODERATE TO STRENUOUS EXERCISE (LIKE A BRISK WALK)?: 7 DAYS

## 2020-10-05 ASSESSMENT — PATIENT HEALTH QUESTIONNAIRE - PHQ9
CLINICAL INTERPRETATION OF PHQ2 SCORE: NO FURTHER SCREENING NEEDED
SUM OF ALL RESPONSES TO PHQ9 QUESTIONS 1 AND 2: 0
CLINICAL INTERPRETATION OF PHQ9 SCORE: NO FURTHER SCREENING NEEDED
SUM OF ALL RESPONSES TO PHQ9 QUESTIONS 1 AND 2: 0
2. FEELING DOWN, DEPRESSED OR HOPELESS: NOT AT ALL
1. LITTLE INTEREST OR PLEASURE IN DOING THINGS: NOT AT ALL

## 2020-10-06 ENCOUNTER — HOSPITAL ENCOUNTER (OUTPATIENT)
Age: 75
Setting detail: OBSERVATION
Discharge: HOME | End: 2020-10-07
Attending: INTERNAL MEDICINE | Admitting: INTERNAL MEDICINE

## 2020-10-06 DIAGNOSIS — I73.9 CLAUDICATION (CMD): ICD-10-CM

## 2020-10-06 DIAGNOSIS — I70.213 ATHEROSCLEROSIS OF NATIVE ARTERY OF BOTH LOWER EXTREMITIES WITH INTERMITTENT CLAUDICATION (CMD): ICD-10-CM

## 2020-10-06 LAB
ACT BLD: 218 BASELINE/TARGET RANGES ARE SET BY CLINICIANS FOR EACH PATIENT/PROCEDURE
ANION GAP SERPL CALC-SCNC: 6 MMOL/L (ref 10–20)
ATRIAL RATE (BPM): 65
BUN SERPL-MCNC: 13 MG/DL (ref 6–20)
BUN/CREAT SERPL: 14 (ref 7–25)
CALCIUM SERPL-MCNC: 9.3 MG/DL (ref 8.4–10.2)
CHLORIDE SERPL-SCNC: 105 MMOL/L (ref 98–107)
CO2 SERPL-SCNC: 30 MMOL/L (ref 21–32)
CREAT SERPL-MCNC: 0.91 MG/DL (ref 0.51–0.95)
ERYTHROCYTE [DISTWIDTH] IN BLOOD: 12.5 % (ref 11–15)
GLUCOSE BLDC GLUCOMTR-MCNC: 101 MG/DL (ref 70–99)
GLUCOSE SERPL-MCNC: 87 MG/DL (ref 65–99)
HCT VFR BLD CALC: 38.4 % (ref 36–46.5)
HGB BLD-MCNC: 12.6 G/DL (ref 12–15.5)
MCH RBC QN AUTO: 31.7 PG (ref 26–34)
MCHC RBC AUTO-ENTMCNC: 32.8 G/DL (ref 32–36.5)
MCV RBC AUTO: 96.7 FL (ref 78–100)
NRBC BLD MANUAL-RTO: 0 /100 WBC
P AXIS (DEGREES): 30
PLATELET # BLD: 265 K/MCL (ref 140–450)
POTASSIUM SERPL-SCNC: 4.3 MMOL/L (ref 3.4–5.1)
PR-INTERVAL (MSEC): 182
QRS-INTERVAL (MSEC): 98
QT-INTERVAL (MSEC): 458
QTC: 476
R AXIS (DEGREES): 43
RBC # BLD: 3.97 MIL/MCL (ref 4–5.2)
REPORT TEXT: NORMAL
SODIUM SERPL-SCNC: 137 MMOL/L (ref 135–145)
T AXIS (DEGREES): 39
VENTRICULAR RATE EKG/MIN (BPM): 65
WBC # BLD: 9.4 K/MCL (ref 4.2–11)

## 2020-10-06 PROCEDURE — C1725 CATH, TRANSLUMIN NON-LASER: HCPCS | Performed by: INTERNAL MEDICINE

## 2020-10-06 PROCEDURE — 93005 ELECTROCARDIOGRAM TRACING: CPT | Performed by: INTERNAL MEDICINE

## 2020-10-06 PROCEDURE — 99152 MOD SED SAME PHYS/QHP 5/>YRS: CPT | Performed by: INTERNAL MEDICINE

## 2020-10-06 PROCEDURE — G0378 HOSPITAL OBSERVATION PER HR: HCPCS

## 2020-10-06 PROCEDURE — 10002807 HB RX 258: Performed by: NURSE PRACTITIONER

## 2020-10-06 PROCEDURE — 99153 MOD SED SAME PHYS/QHP EA: CPT | Performed by: INTERNAL MEDICINE

## 2020-10-06 PROCEDURE — C1887 CATHETER, GUIDING: HCPCS | Performed by: INTERNAL MEDICINE

## 2020-10-06 PROCEDURE — 36415 COLL VENOUS BLD VENIPUNCTURE: CPT

## 2020-10-06 PROCEDURE — 10006023 HB SUPPLY 272: Performed by: INTERNAL MEDICINE

## 2020-10-06 PROCEDURE — 13000001 HB PHASE II RECOVERY EA 30 MINUTES: Performed by: INTERNAL MEDICINE

## 2020-10-06 PROCEDURE — 85027 COMPLETE CBC AUTOMATED: CPT

## 2020-10-06 PROCEDURE — 37236 OPEN/PERQ PLACE STENT 1ST: CPT | Performed by: INTERNAL MEDICINE

## 2020-10-06 PROCEDURE — 37221 CATH LAB CASE: CPT | Performed by: INTERNAL MEDICINE

## 2020-10-06 PROCEDURE — 80048 BASIC METABOLIC PNL TOTAL CA: CPT

## 2020-10-06 PROCEDURE — 85347 COAGULATION TIME ACTIVATED: CPT | Performed by: INTERNAL MEDICINE

## 2020-10-06 PROCEDURE — C1760 CLOSURE DEV, VASC: HCPCS | Performed by: INTERNAL MEDICINE

## 2020-10-06 PROCEDURE — 10002803 HB RX 637: Performed by: INTERNAL MEDICINE

## 2020-10-06 PROCEDURE — 10002805 HB CONTRAST AGENT: Performed by: INTERNAL MEDICINE

## 2020-10-06 PROCEDURE — 10002807 HB RX 258: Performed by: INTERNAL MEDICINE

## 2020-10-06 PROCEDURE — 10002800 HB RX 250 W HCPCS: Performed by: INTERNAL MEDICINE

## 2020-10-06 PROCEDURE — G0269 OCCLUSIVE DEVICE IN VEIN ART: HCPCS | Performed by: INTERNAL MEDICINE

## 2020-10-06 PROCEDURE — X1094 NO CHARGE VISIT: HCPCS | Performed by: INTERNAL MEDICINE

## 2020-10-06 PROCEDURE — C1894 INTRO/SHEATH, NON-LASER: HCPCS | Performed by: INTERNAL MEDICINE

## 2020-10-06 PROCEDURE — C9765 REVASC INTRA LITHOTRIP-STENT: HCPCS | Performed by: INTERNAL MEDICINE

## 2020-10-06 PROCEDURE — C1769 GUIDE WIRE: HCPCS | Performed by: INTERNAL MEDICINE

## 2020-10-06 PROCEDURE — C1876 STENT, NON-COA/NON-COV W/DEL: HCPCS | Performed by: INTERNAL MEDICINE

## 2020-10-06 PROCEDURE — 10002801 HB RX 250 W/O HCPCS: Performed by: INTERNAL MEDICINE

## 2020-10-06 PROCEDURE — 10006027 HB SUPPLY 278: Performed by: INTERNAL MEDICINE

## 2020-10-06 PROCEDURE — 82962 GLUCOSE BLOOD TEST: CPT

## 2020-10-06 PROCEDURE — 10004651 HB RX, NO CHARGE ITEM: Performed by: INTERNAL MEDICINE

## 2020-10-06 DEVICE — PREMOUNTED STENT SYSTEM
Type: IMPLANTABLE DEVICE | Site: ILIAC ARTERY | Status: FUNCTIONAL
Brand: EXPRESS® LD ILIAC / BILIARY

## 2020-10-06 DEVICE — MYNXGRIP 6F/7F
Type: IMPLANTABLE DEVICE | Site: GROIN | Status: FUNCTIONAL
Brand: MYNXGRIP

## 2020-10-06 RX ORDER — SODIUM CHLORIDE 9 MG/ML
INJECTION, SOLUTION INTRAVENOUS CONTINUOUS
Status: ACTIVE | OUTPATIENT
Start: 2020-10-06 | End: 2020-10-06

## 2020-10-06 RX ORDER — IODIXANOL 320 MG/ML
INJECTION, SOLUTION INTRAVASCULAR PRN
Status: DISCONTINUED | OUTPATIENT
Start: 2020-10-06 | End: 2020-10-07 | Stop reason: HOSPADM

## 2020-10-06 RX ORDER — SODIUM CHLORIDE 9 MG/ML
INJECTION, SOLUTION INTRAVENOUS CONTINUOUS
Status: DISCONTINUED | OUTPATIENT
Start: 2020-10-06 | End: 2020-10-06 | Stop reason: SDUPTHER

## 2020-10-06 RX ORDER — 0.9 % SODIUM CHLORIDE 0.9 %
2 VIAL (ML) INJECTION EVERY 12 HOURS SCHEDULED
Status: DISCONTINUED | OUTPATIENT
Start: 2020-10-06 | End: 2020-10-07 | Stop reason: HOSPADM

## 2020-10-06 RX ORDER — MIDAZOLAM HYDROCHLORIDE 1 MG/ML
INJECTION, SOLUTION INTRAMUSCULAR; INTRAVENOUS PRN
Status: DISCONTINUED | OUTPATIENT
Start: 2020-10-06 | End: 2020-10-07 | Stop reason: HOSPADM

## 2020-10-06 RX ORDER — ASPIRIN 81 MG/1
81 TABLET, CHEWABLE ORAL DAILY
Status: DISCONTINUED | OUTPATIENT
Start: 2020-10-06 | End: 2020-10-07 | Stop reason: HOSPADM

## 2020-10-06 RX ORDER — HEPARIN SODIUM 1000 [USP'U]/ML
INJECTION, SOLUTION INTRAVENOUS; SUBCUTANEOUS PRN
Status: DISCONTINUED | OUTPATIENT
Start: 2020-10-06 | End: 2020-10-07 | Stop reason: HOSPADM

## 2020-10-06 RX ORDER — ACETAMINOPHEN 500 MG
500 TABLET ORAL EVERY 6 HOURS PRN
Status: DISCONTINUED | OUTPATIENT
Start: 2020-10-06 | End: 2020-10-07 | Stop reason: HOSPADM

## 2020-10-06 RX ORDER — LIDOCAINE HYDROCHLORIDE 10 MG/ML
INJECTION, SOLUTION EPIDURAL; INFILTRATION; INTRACAUDAL; PERINEURAL PRN
Status: DISCONTINUED | OUTPATIENT
Start: 2020-10-06 | End: 2020-10-07 | Stop reason: HOSPADM

## 2020-10-06 RX ORDER — CLOPIDOGREL BISULFATE 75 MG/1
75 TABLET ORAL DAILY
Status: DISCONTINUED | OUTPATIENT
Start: 2020-10-06 | End: 2020-10-07 | Stop reason: HOSPADM

## 2020-10-06 RX ADMIN — SODIUM CHLORIDE: 0.9 INJECTION, SOLUTION INTRAVENOUS at 13:03

## 2020-10-06 RX ADMIN — CLOPIDOGREL BISULFATE 75 MG: 75 TABLET, FILM COATED ORAL at 18:21

## 2020-10-06 RX ADMIN — ATROPINE SULFATE 0.5 MG: 0.1 INJECTION PARENTERAL at 16:40

## 2020-10-06 RX ADMIN — SODIUM CHLORIDE: 9 INJECTION, SOLUTION INTRAVENOUS at 16:09

## 2020-10-06 RX ADMIN — ACETAMINOPHEN 500 MG: 500 TABLET ORAL at 21:02

## 2020-10-06 ASSESSMENT — COGNITIVE AND FUNCTIONAL STATUS - GENERAL
ARE YOU DEAF OR DO YOU HAVE SERIOUS DIFFICULTY  HEARING: NO
ARE YOU BLIND OR DO YOU HAVE SERIOUS DIFFICULTY SEEING, EVEN WHEN WEARING GLASSES: NO

## 2020-10-06 ASSESSMENT — PAIN SCALES - GENERAL
PAINLEVEL_OUTOF10: 0
PAINLEVEL_OUTOF10: 3
PAINLEVEL_OUTOF10: 0

## 2020-10-06 ASSESSMENT — ACTIVITIES OF DAILY LIVING (ADL)
ADL_BEFORE_ADMISSION: INDEPENDENT
CHRONIC_PAIN_PRESENT: NO
ADL_SHORT_OF_BREATH: NO
RECENT_DECLINE_ADL: NO
ADL_SCORE: 12

## 2020-10-06 ASSESSMENT — LIFESTYLE VARIABLES
HOW OFTEN DO YOU HAVE 6 OR MORE DRINKS ON ONE OCCASION: NEVER
HOW OFTEN DO YOU HAVE A DRINK CONTAINING ALCOHOL: NEVER
AUDIT-C TOTAL SCORE: 0
HOW MANY STANDARD DRINKS CONTAINING ALCOHOL DO YOU HAVE ON A TYPICAL DAY: 0,1 OR 2

## 2020-10-07 VITALS
TEMPERATURE: 98.1 F | BODY MASS INDEX: 24.46 KG/M2 | DIASTOLIC BLOOD PRESSURE: 76 MMHG | HEART RATE: 60 BPM | OXYGEN SATURATION: 96 % | RESPIRATION RATE: 16 BRPM | SYSTOLIC BLOOD PRESSURE: 150 MMHG | WEIGHT: 132.94 LBS | HEIGHT: 62 IN

## 2020-10-07 PROCEDURE — 10004651 HB RX, NO CHARGE ITEM: Performed by: INTERNAL MEDICINE

## 2020-10-07 PROCEDURE — G0378 HOSPITAL OBSERVATION PER HR: HCPCS

## 2020-10-07 PROCEDURE — 99214 OFFICE O/P EST MOD 30 MIN: CPT | Performed by: NURSE PRACTITIONER

## 2020-10-07 PROCEDURE — 10002803 HB RX 637: Performed by: INTERNAL MEDICINE

## 2020-10-07 RX ORDER — PANTOPRAZOLE SODIUM 40 MG/1
40 TABLET, DELAYED RELEASE ORAL
Status: DISCONTINUED | OUTPATIENT
Start: 2020-10-07 | End: 2020-10-07 | Stop reason: HOSPADM

## 2020-10-07 RX ORDER — PRAVASTATIN SODIUM 20 MG
20 TABLET ORAL NIGHTLY
Status: DISCONTINUED | OUTPATIENT
Start: 2020-10-07 | End: 2020-10-07 | Stop reason: HOSPADM

## 2020-10-07 RX ORDER — CLOPIDOGREL BISULFATE 75 MG/1
75 TABLET ORAL DAILY
Qty: 30 TABLET | Refills: 3 | Status: SHIPPED | OUTPATIENT
Start: 2020-10-08 | End: 2021-04-26 | Stop reason: SDUPTHER

## 2020-10-07 RX ORDER — EZETIMIBE 10 MG/1
10 TABLET ORAL DAILY
Status: DISCONTINUED | OUTPATIENT
Start: 2020-10-07 | End: 2020-10-07 | Stop reason: HOSPADM

## 2020-10-07 RX ORDER — LISINOPRIL 20 MG/1
20 TABLET ORAL DAILY
Status: DISCONTINUED | OUTPATIENT
Start: 2020-10-07 | End: 2020-10-07 | Stop reason: HOSPADM

## 2020-10-07 RX ORDER — CLOPIDOGREL BISULFATE 75 MG/1
75 TABLET ORAL DAILY
Qty: 30 TABLET | Refills: 3 | Status: SHIPPED | OUTPATIENT
Start: 2020-10-08 | End: 2020-10-07

## 2020-10-07 RX ORDER — FUROSEMIDE 20 MG/1
20 TABLET ORAL DAILY
Status: DISCONTINUED | OUTPATIENT
Start: 2020-10-07 | End: 2020-10-07 | Stop reason: HOSPADM

## 2020-10-07 RX ORDER — ACETAMINOPHEN 500 MG
500 TABLET ORAL EVERY 6 HOURS PRN
Status: SHIPPED | COMMUNITY
Start: 2020-10-07 | End: 2023-06-30

## 2020-10-07 RX ORDER — METOPROLOL SUCCINATE 50 MG/1
50 TABLET, EXTENDED RELEASE ORAL DAILY
Status: DISCONTINUED | OUTPATIENT
Start: 2020-10-07 | End: 2020-10-07 | Stop reason: HOSPADM

## 2020-10-07 RX ADMIN — SODIUM CHLORIDE, PRESERVATIVE FREE 2 ML: 5 INJECTION INTRAVENOUS at 08:03

## 2020-10-07 RX ADMIN — ASPIRIN 81 MG: 81 TABLET, CHEWABLE ORAL at 08:03

## 2020-10-07 RX ADMIN — CLOPIDOGREL BISULFATE 75 MG: 75 TABLET, FILM COATED ORAL at 08:03

## 2020-10-07 ASSESSMENT — ENCOUNTER SYMPTOMS
GASTROINTESTINAL NEGATIVE: 1
ADENOPATHY: 0
RHINORRHEA: 0
ABDOMINAL PAIN: 0
FEVER: 0
LIGHT-HEADEDNESS: 1
PSYCHIATRIC NEGATIVE: 1
NUMBNESS: 0
FATIGUE: 0
DIARRHEA: 0
PHOTOPHOBIA: 0
SHORTNESS OF BREATH: 0
CHILLS: 0
NAUSEA: 0
VOMITING: 0
DIZZINESS: 1
RESPIRATORY NEGATIVE: 1
SORE THROAT: 0
NEUROLOGICAL NEGATIVE: 1
WEAKNESS: 0
CONSTITUTIONAL NEGATIVE: 1
HEADACHES: 0
SINUS PRESSURE: 0
WHEEZING: 0
HALLUCINATIONS: 0

## 2020-10-07 ASSESSMENT — PAIN SCALES - GENERAL: PAINLEVEL_OUTOF10: 0

## 2020-10-16 ENCOUNTER — APPOINTMENT (OUTPATIENT)
Dept: CARDIOLOGY | Age: 75
End: 2020-10-16

## 2020-10-19 ENCOUNTER — OFFICE VISIT (OUTPATIENT)
Dept: CARDIOLOGY | Age: 75
End: 2020-10-19

## 2020-10-19 ENCOUNTER — APPOINTMENT (OUTPATIENT)
Dept: CARDIOLOGY | Age: 75
End: 2020-10-19

## 2020-10-19 DIAGNOSIS — I10 ESSENTIAL HYPERTENSION: ICD-10-CM

## 2020-10-19 DIAGNOSIS — I73.9 PAD (PERIPHERAL ARTERY DISEASE) (CMD): ICD-10-CM

## 2020-10-19 DIAGNOSIS — Z09 HOSPITAL DISCHARGE FOLLOW-UP: ICD-10-CM

## 2020-10-19 DIAGNOSIS — I27.20 PULMONARY HTN (CMD): ICD-10-CM

## 2020-10-19 DIAGNOSIS — I50.32 CHRONIC CONGESTIVE HEART FAILURE WITH LEFT VENTRICULAR DIASTOLIC DYSFUNCTION (CMD): Primary | ICD-10-CM

## 2020-10-19 DIAGNOSIS — E78.2 HYPERLIPIDEMIA, MIXED: ICD-10-CM

## 2020-10-19 DIAGNOSIS — I73.9 CLAUDICATION (CMD): ICD-10-CM

## 2020-10-19 PROCEDURE — 99213 OFFICE O/P EST LOW 20 MIN: CPT | Performed by: NURSE PRACTITIONER

## 2020-10-19 ASSESSMENT — ENCOUNTER SYMPTOMS
COUGH: 0
LIGHT-HEADEDNESS: 0
DIZZINESS: 0
BLOATING: 0

## 2020-10-19 ASSESSMENT — PATIENT HEALTH QUESTIONNAIRE - PHQ9
SUM OF ALL RESPONSES TO PHQ9 QUESTIONS 1 AND 2: 0
CLINICAL INTERPRETATION OF PHQ2 SCORE: NO FURTHER SCREENING NEEDED
CLINICAL INTERPRETATION OF PHQ9 SCORE: NO FURTHER SCREENING NEEDED
SUM OF ALL RESPONSES TO PHQ9 QUESTIONS 1 AND 2: 0
1. LITTLE INTEREST OR PLEASURE IN DOING THINGS: NOT AT ALL
2. FEELING DOWN, DEPRESSED OR HOPELESS: NOT AT ALL

## 2020-10-26 PROBLEM — I71.40 ABDOMINAL AORTIC ANEURYSM (AAA) (CMD): Status: ACTIVE | Noted: 2020-10-26

## 2020-10-27 ENCOUNTER — OFFICE VISIT (OUTPATIENT)
Dept: CARDIOLOGY | Age: 75
End: 2020-10-27

## 2020-10-27 VITALS
DIASTOLIC BLOOD PRESSURE: 60 MMHG | HEART RATE: 62 BPM | BODY MASS INDEX: 25.65 KG/M2 | SYSTOLIC BLOOD PRESSURE: 130 MMHG | WEIGHT: 138 LBS

## 2020-10-27 DIAGNOSIS — E78.2 HYPERLIPIDEMIA, MIXED: ICD-10-CM

## 2020-10-27 DIAGNOSIS — I73.9 PAD (PERIPHERAL ARTERY DISEASE) (CMD): ICD-10-CM

## 2020-10-27 DIAGNOSIS — I50.32 CHRONIC CONGESTIVE HEART FAILURE WITH LEFT VENTRICULAR DIASTOLIC DYSFUNCTION (CMD): ICD-10-CM

## 2020-10-27 DIAGNOSIS — I10 ESSENTIAL HYPERTENSION: ICD-10-CM

## 2020-10-27 DIAGNOSIS — I71.40 ABDOMINAL AORTIC ANEURYSM (AAA) WITHOUT RUPTURE (CMD): ICD-10-CM

## 2020-10-27 DIAGNOSIS — I27.20 PULMONARY HTN (CMD): ICD-10-CM

## 2020-10-27 DIAGNOSIS — I25.10 CAD IN NATIVE ARTERY: Primary | ICD-10-CM

## 2020-10-27 PROCEDURE — 99214 OFFICE O/P EST MOD 30 MIN: CPT | Performed by: INTERNAL MEDICINE

## 2020-10-27 RX ORDER — ROSUVASTATIN CALCIUM 20 MG/1
20 TABLET, COATED ORAL DAILY
Qty: 90 TABLET | Refills: 3 | Status: SHIPPED | OUTPATIENT
Start: 2020-10-27 | End: 2021-08-31

## 2020-10-27 RX ORDER — ROSUVASTATIN CALCIUM 20 MG/1
20 TABLET, COATED ORAL DAILY
COMMUNITY
End: 2020-10-27 | Stop reason: SDUPTHER

## 2020-10-27 SDOH — HEALTH STABILITY: PHYSICAL HEALTH: ON AVERAGE, HOW MANY MINUTES DO YOU ENGAGE IN EXERCISE AT THIS LEVEL?: NOT ASKED

## 2020-10-27 SDOH — HEALTH STABILITY: PHYSICAL HEALTH: ON AVERAGE, HOW MANY DAYS PER WEEK DO YOU ENGAGE IN MODERATE TO STRENUOUS EXERCISE (LIKE A BRISK WALK)?: 7 DAYS

## 2020-10-27 ASSESSMENT — PATIENT HEALTH QUESTIONNAIRE - PHQ9
SUM OF ALL RESPONSES TO PHQ9 QUESTIONS 1 AND 2: 0
CLINICAL INTERPRETATION OF PHQ9 SCORE: NO FURTHER SCREENING NEEDED
2. FEELING DOWN, DEPRESSED OR HOPELESS: NOT AT ALL
1. LITTLE INTEREST OR PLEASURE IN DOING THINGS: NOT AT ALL
SUM OF ALL RESPONSES TO PHQ9 QUESTIONS 1 AND 2: 0
CLINICAL INTERPRETATION OF PHQ2 SCORE: NO FURTHER SCREENING NEEDED

## 2020-11-20 ENCOUNTER — V-VISIT (OUTPATIENT)
Dept: CARDIOLOGY | Age: 75
End: 2020-11-20

## 2020-11-20 VITALS
BODY MASS INDEX: 24.84 KG/M2 | HEART RATE: 61 BPM | DIASTOLIC BLOOD PRESSURE: 50 MMHG | SYSTOLIC BLOOD PRESSURE: 112 MMHG | WEIGHT: 135 LBS | HEIGHT: 62 IN

## 2020-11-20 DIAGNOSIS — I73.9 CLAUDICATION (CMD): ICD-10-CM

## 2020-11-20 DIAGNOSIS — I73.9 PAD (PERIPHERAL ARTERY DISEASE) (CMD): ICD-10-CM

## 2020-11-20 DIAGNOSIS — I10 ESSENTIAL HYPERTENSION: ICD-10-CM

## 2020-11-20 DIAGNOSIS — I27.20 PULMONARY HTN (CMD): ICD-10-CM

## 2020-11-20 DIAGNOSIS — E78.2 HYPERLIPIDEMIA, MIXED: ICD-10-CM

## 2020-11-20 DIAGNOSIS — I74.5 ILIAC ARTERY OCCLUSION (CMD): Primary | ICD-10-CM

## 2020-11-20 DIAGNOSIS — I71.40 ABDOMINAL AORTIC ANEURYSM (AAA) WITHOUT RUPTURE (CMD): ICD-10-CM

## 2020-11-20 DIAGNOSIS — R06.02 SHORTNESS OF BREATH: ICD-10-CM

## 2020-11-20 DIAGNOSIS — I50.32 CHRONIC CONGESTIVE HEART FAILURE WITH LEFT VENTRICULAR DIASTOLIC DYSFUNCTION (CMD): ICD-10-CM

## 2020-11-20 PROCEDURE — 99442 TELEPHONE E&M BY PHYSICIAN EST PT NOT ORIG PREV 7 DAYS 11-20 MIN: CPT | Performed by: INTERNAL MEDICINE

## 2020-11-20 SDOH — HEALTH STABILITY: PHYSICAL HEALTH: ON AVERAGE, HOW MANY DAYS PER WEEK DO YOU ENGAGE IN MODERATE TO STRENUOUS EXERCISE (LIKE A BRISK WALK)?: 7 DAYS

## 2020-11-20 SDOH — HEALTH STABILITY: PHYSICAL HEALTH: ON AVERAGE, HOW MANY MINUTES DO YOU ENGAGE IN EXERCISE AT THIS LEVEL?: NOT ASKED

## 2020-11-20 ASSESSMENT — PATIENT HEALTH QUESTIONNAIRE - PHQ9
CLINICAL INTERPRETATION OF PHQ2 SCORE: NO FURTHER SCREENING NEEDED
SUM OF ALL RESPONSES TO PHQ9 QUESTIONS 1 AND 2: 0
CLINICAL INTERPRETATION OF PHQ9 SCORE: NO FURTHER SCREENING NEEDED
1. LITTLE INTEREST OR PLEASURE IN DOING THINGS: NOT AT ALL
SUM OF ALL RESPONSES TO PHQ9 QUESTIONS 1 AND 2: 0
2. FEELING DOWN, DEPRESSED OR HOPELESS: NOT AT ALL

## 2020-12-01 ENCOUNTER — TELEPHONE (OUTPATIENT)
Dept: CARDIOLOGY | Age: 75
End: 2020-12-01

## 2020-12-07 ENCOUNTER — LAB SERVICES (OUTPATIENT)
Dept: LAB | Age: 75
End: 2020-12-07

## 2020-12-07 ENCOUNTER — ANCILLARY PROCEDURE (OUTPATIENT)
Dept: CARDIOLOGY | Age: 75
End: 2020-12-07
Attending: INTERNAL MEDICINE

## 2020-12-07 VITALS — WEIGHT: 135 LBS | BODY MASS INDEX: 24.84 KG/M2 | HEIGHT: 62 IN

## 2020-12-07 DIAGNOSIS — I10 ESSENTIAL HYPERTENSION: ICD-10-CM

## 2020-12-07 DIAGNOSIS — I73.9 PAD (PERIPHERAL ARTERY DISEASE) (CMD): ICD-10-CM

## 2020-12-07 DIAGNOSIS — E78.2 HYPERLIPIDEMIA, MIXED: ICD-10-CM

## 2020-12-07 DIAGNOSIS — I27.20 PULMONARY HTN (CMD): ICD-10-CM

## 2020-12-07 DIAGNOSIS — Z82.49 FAMILY HISTORY OF CORONARY ARTERIOSCLEROSIS: ICD-10-CM

## 2020-12-07 DIAGNOSIS — I71.40 ABDOMINAL AORTIC ANEURYSM (AAA) WITHOUT RUPTURE (CMD): ICD-10-CM

## 2020-12-07 DIAGNOSIS — I50.32 CHRONIC CONGESTIVE HEART FAILURE WITH LEFT VENTRICULAR DIASTOLIC DYSFUNCTION (CMD): ICD-10-CM

## 2020-12-07 DIAGNOSIS — R94.39 ABNORMAL CARDIOVASCULAR STRESS TEST: ICD-10-CM

## 2020-12-07 DIAGNOSIS — R06.02 SHORTNESS OF BREATH: ICD-10-CM

## 2020-12-07 LAB
ALT SERPL-CCNC: 31 UNITS/L
AST SERPL-CCNC: 26 UNITS/L
CHOLEST SERPL-MCNC: 114 MG/DL
CHOLEST/HDLC SERPL: 1.9 {RATIO}
FASTING DURATION TIME PATIENT: 12 HOURS
HDLC SERPL-MCNC: 59 MG/DL
HEART RATE RESERVE PREDICTED: 15.28 BPM
LDLC SERPL CALC-MCNC: 40 MG/DL
LV EF: 66 %
NONHDLC SERPL-MCNC: 55 MG/DL
PEAK HR ACHIEVED: 122 BPM
RAINBOW EXTRA TUBES HOLD SPECIMEN: NORMAL
RESTING HR ACHIEVED: 58 BPM
STRESS BASELINE BP: NORMAL MMHG
STRESS O2 SAT REST: 98 %
STRESS PERCENT HR: 85 %
STRESS POST ESTIMATED WORKLOAD: 5.2 METS
STRESS POST EXERCISE DUR MIN: 4 MIN
STRESS POST EXERCISE DUR SEC: 2 SEC
STRESS POST O2 SAT PEAK: 97 %
STRESS POST PEAK BP: NORMAL MMHG
STRESS TARGET HR: 144 BPM
TRIGL SERPL-MCNC: 74 MG/DL

## 2020-12-07 PROCEDURE — 78452 HT MUSCLE IMAGE SPECT MULT: CPT | Performed by: INTERNAL MEDICINE

## 2020-12-07 PROCEDURE — 80061 LIPID PANEL: CPT | Performed by: CLINICAL MEDICAL LABORATORY

## 2020-12-07 PROCEDURE — X1094 NO CHARGE VISIT: HCPCS | Performed by: INTERNAL MEDICINE

## 2020-12-07 PROCEDURE — 94621 CARDIOPULM EXERCISE TESTING: CPT | Performed by: INTERNAL MEDICINE

## 2020-12-07 PROCEDURE — A9502 TC99M TETROFOSMIN: HCPCS | Performed by: INTERNAL MEDICINE

## 2020-12-07 PROCEDURE — 84460 ALANINE AMINO (ALT) (SGPT): CPT | Performed by: CLINICAL MEDICAL LABORATORY

## 2020-12-07 PROCEDURE — 36415 COLL VENOUS BLD VENIPUNCTURE: CPT | Performed by: CLINICAL MEDICAL LABORATORY

## 2020-12-07 PROCEDURE — 84450 TRANSFERASE (AST) (SGOT): CPT | Performed by: CLINICAL MEDICAL LABORATORY

## 2020-12-07 ASSESSMENT — EXERCISE STRESS TEST
PEAK_BP: 160/70
STAGE_CATEGORIES: RECOVERY 1
GRADE: 11
PEAK_HR: 58
STRESS_SYMPTOMS: DYSPNEA
PEAK_RPP: 6500
MPH: 2.7
STAGE_CATEGORIES: 2
PEAK_HR: 123
PEAK_BP: 140/72
PEAK_RPP: 8120
MPH: 3.1
PEAK_HR: 65
PEAK_HR: 123
PEAK_HR: 96
GRADE: 8
PEAK_METS: 5.2
PEAK_BP: 120/60
PEAK_BP: 140/70
PEAK_BP: 100/60
STAGE_CATEGORIES: RECOVERY 2
PEAK_RPP: 13440
PEAK_HR: 87
PEAK_RPP: 19680
STAGE_CATEGORIES: 1
PEAK_O2_SAT: 97
PEAK_O2_SAT: 99
PEAK_O2_SAT: 98
PEAK_RPP: 10440
STAGE_CATEGORIES: RESTING
STAGE_CATEGORIES: RECOVERY 0

## 2020-12-10 ENCOUNTER — APPOINTMENT (OUTPATIENT)
Dept: CARDIOLOGY | Age: 75
End: 2020-12-10
Attending: INTERNAL MEDICINE

## 2020-12-13 ASSESSMENT — ENCOUNTER SYMPTOMS
BRUISES/BLEEDS EASILY: 0
HEMOPTYSIS: 0
SUSPICIOUS LESIONS: 0
CHILLS: 0
WEIGHT LOSS: 0
WEIGHT GAIN: 0
HEMATOCHEZIA: 0
ALLERGIC/IMMUNOLOGIC COMMENTS: NO NEW FOOD ALLERGIES
FEVER: 0
COUGH: 0

## 2020-12-14 ENCOUNTER — OFFICE VISIT (OUTPATIENT)
Dept: CARDIOLOGY | Age: 75
End: 2020-12-14

## 2020-12-14 VITALS
HEIGHT: 62 IN | BODY MASS INDEX: 25.4 KG/M2 | WEIGHT: 138 LBS | SYSTOLIC BLOOD PRESSURE: 138 MMHG | HEART RATE: 64 BPM | DIASTOLIC BLOOD PRESSURE: 76 MMHG

## 2020-12-14 DIAGNOSIS — I27.20 PULMONARY HTN (CMD): Primary | ICD-10-CM

## 2020-12-14 DIAGNOSIS — Z82.49 FAMILY HISTORY OF CORONARY ARTERIOSCLEROSIS: ICD-10-CM

## 2020-12-14 DIAGNOSIS — E78.2 HYPERLIPIDEMIA, MIXED: ICD-10-CM

## 2020-12-14 DIAGNOSIS — I10 ESSENTIAL HYPERTENSION: ICD-10-CM

## 2020-12-14 DIAGNOSIS — I25.10 CAD IN NATIVE ARTERY: ICD-10-CM

## 2020-12-14 DIAGNOSIS — I73.9 PAD (PERIPHERAL ARTERY DISEASE) (CMD): ICD-10-CM

## 2020-12-14 DIAGNOSIS — I71.40 ABDOMINAL AORTIC ANEURYSM (AAA) WITHOUT RUPTURE (CMD): ICD-10-CM

## 2020-12-14 PROCEDURE — 99214 OFFICE O/P EST MOD 30 MIN: CPT | Performed by: INTERNAL MEDICINE

## 2020-12-14 RX ORDER — ALPRAZOLAM 0.25 MG/1
0.25 TABLET ORAL DAILY PRN
COMMUNITY
Start: 2020-12-10 | End: 2022-05-20

## 2020-12-14 SDOH — HEALTH STABILITY: PHYSICAL HEALTH: ON AVERAGE, HOW MANY MINUTES DO YOU ENGAGE IN EXERCISE AT THIS LEVEL?: NOT ASKED

## 2020-12-14 SDOH — HEALTH STABILITY: PHYSICAL HEALTH: ON AVERAGE, HOW MANY DAYS PER WEEK DO YOU ENGAGE IN MODERATE TO STRENUOUS EXERCISE (LIKE A BRISK WALK)?: 7 DAYS

## 2020-12-14 ASSESSMENT — PATIENT HEALTH QUESTIONNAIRE - PHQ9
CLINICAL INTERPRETATION OF PHQ9 SCORE: NO FURTHER SCREENING NEEDED
1. LITTLE INTEREST OR PLEASURE IN DOING THINGS: NOT AT ALL
SUM OF ALL RESPONSES TO PHQ9 QUESTIONS 1 AND 2: 0
CLINICAL INTERPRETATION OF PHQ2 SCORE: NO FURTHER SCREENING NEEDED
SUM OF ALL RESPONSES TO PHQ9 QUESTIONS 1 AND 2: 0
2. FEELING DOWN, DEPRESSED OR HOPELESS: NOT AT ALL

## 2020-12-17 LAB
BODY MASS INDEX: 26.2
BREATHING RESERVE (CALCULATED) PREDICTED: 20.86 %
BREATHING RESERVE (MEASURED) ACHIEVED: 11.5 %
CHANGE VO2/ CHANGE WR ACHIEVED: 7.6 ML/MIN/WATT
CHRONOTROPIC INDEX PREDICTED: 0.77
HEART RATE RESERVE PREDICTED: 14.58 BPM
HEIGHT: 155 CM
IDEAL BODY WEIGHT: 58 KG
METS ACHIEVED: 5.2
O2 SATURATION ACHIEVED: 97 %
OUES ACHIEVED: 1763.6
PEAK HR ACHIEVED: 123 BPM
PEAK HR PREDICTED: 144 BPM
PEAK O2 PULSE (%) PREDICTED: 115.65 %
PEAK O2 PULSE (ML/BEAT) ACHIEVED: 9.24 ML/BEAT
PEAK O2 PULSE (ML/BEAT) PREDICTED: 7.99 ML/BEAT
PEAK RESPIRATORY RATE ACHIEVED: 44 BRS/MIN
PEAK VE ACHIEVED: 55.4 L/MIN
PECO2 ACHIEVED: 16.5 MMHG
PECO2/PETCO2 AT PREDICTED: 68.75 %
PETCO2 ACHIEVED: 24 MMHG
PREDICTED VO2 % AT AT: 67.03 %
PREDICTED VO2 %: 98.92 %
RER MAX ACHIEVED: 1.05
RESTING HR ACHIEVED: 58 BPM
RESTING MVV: 70 L/MIN
STRESS BASELINE BP: NORMAL MMHG
STRESS O2 SAT REST: 98 %
STRESS PERCENT HR: 85 %
STRESS POST ESTIMATED WORKLOAD: 5.2 METS
STRESS POST EXERCISE DUR MIN: 4 MIN
STRESS POST EXERCISE DUR SEC: 2 SEC
STRESS POST O2 SAT PEAK: 97 %
STRESS POST PEAK BP: NORMAL MMHG
STRESS TARGET HR: 144 BPM
VD/VT ACHIEVED: 0.33
VE/VCO2 AT ACHIEVED: 51
VE/VO2 AT ACHIEVED: 44
VO2 AT ACHIEVED: 12.4 ML/KG/MIN
VO2 AT AT (ML/MIN) ACHIEVED: 773 ML/MIN
VO2 AT, IBW ACHIEVED: 13.33 ML/KG/MIN
VO2 PEAK (ML/KG/MIN) ACHIEVED: 18.3 ML/KG/MIN
VO2 PEAK (ML/KG/MIN) PREDICTED: 18.5 ML/KG/MIN
VO2 PEAK (ML/MIN) ACHIEVED: 1137 ML/MIN
VO2 PEAK (ML/MIN) PREDICTED: 1151 ML/MIN
VO2 PEAK IBW ACHIEVED: 19.6 ML/KG/MIN
VT/IC PREDICTED: 73 %
WEIGHT MEASUREMENT: 63 KG

## 2020-12-18 ENCOUNTER — DOCUMENTATION (OUTPATIENT)
Dept: CARDIOLOGY | Age: 75
End: 2020-12-18

## 2020-12-22 ENCOUNTER — TELEPHONE (OUTPATIENT)
Dept: CARDIOLOGY | Age: 75
End: 2020-12-22

## 2020-12-30 PROBLEM — I73.9 PAD (PERIPHERAL ARTERY DISEASE) (HCC): Status: ACTIVE | Noted: 2020-09-03

## 2020-12-30 PROBLEM — I71.40 ABDOMINAL AORTIC ANEURYSM (AAA): Status: ACTIVE | Noted: 2020-08-13

## 2020-12-30 PROBLEM — I71.4 ABDOMINAL AORTIC ANEURYSM (AAA) (HCC): Status: ACTIVE | Noted: 2020-08-13

## 2020-12-30 PROBLEM — I73.9 CLAUDICATION (HCC): Status: ACTIVE | Noted: 2020-09-03

## 2020-12-30 PROBLEM — I73.9 PAD (PERIPHERAL ARTERY DISEASE): Status: ACTIVE | Noted: 2020-09-03

## 2020-12-30 PROBLEM — I73.9 CLAUDICATION: Status: ACTIVE | Noted: 2020-09-03

## 2020-12-30 PROBLEM — I71.40 ABDOMINAL AORTIC ANEURYSM (AAA) (HCC): Status: ACTIVE | Noted: 2020-08-13

## 2021-04-26 RX ORDER — CLOPIDOGREL BISULFATE 75 MG/1
75 TABLET ORAL DAILY
Qty: 30 TABLET | Refills: 2 | Status: SHIPPED | OUTPATIENT
Start: 2021-04-26 | End: 2021-07-15 | Stop reason: SDUPTHER

## 2021-04-27 ENCOUNTER — APPOINTMENT (OUTPATIENT)
Dept: CARDIOLOGY | Age: 76
End: 2021-04-27
Attending: INTERNAL MEDICINE

## 2021-05-18 ENCOUNTER — ANCILLARY PROCEDURE (OUTPATIENT)
Dept: CARDIOLOGY | Age: 76
End: 2021-05-18
Attending: INTERNAL MEDICINE

## 2021-05-18 DIAGNOSIS — I27.20 PULMONARY HTN (CMD): ICD-10-CM

## 2021-05-18 DIAGNOSIS — E78.2 HYPERLIPIDEMIA, MIXED: ICD-10-CM

## 2021-05-18 DIAGNOSIS — R06.02 SHORTNESS OF BREATH: ICD-10-CM

## 2021-05-18 DIAGNOSIS — I73.9 PAD (PERIPHERAL ARTERY DISEASE) (CMD): ICD-10-CM

## 2021-05-18 DIAGNOSIS — I10 ESSENTIAL HYPERTENSION: ICD-10-CM

## 2021-05-18 DIAGNOSIS — I71.40 ABDOMINAL AORTIC ANEURYSM (AAA) WITHOUT RUPTURE (CMD): ICD-10-CM

## 2021-05-18 DIAGNOSIS — Z82.49 FAMILY HISTORY OF CORONARY ARTERIOSCLEROSIS: ICD-10-CM

## 2021-05-18 DIAGNOSIS — R94.39 ABNORMAL CARDIOVASCULAR STRESS TEST: ICD-10-CM

## 2021-05-18 DIAGNOSIS — I25.10 CAD IN NATIVE ARTERY: ICD-10-CM

## 2021-05-18 PROCEDURE — 93306 TTE W/DOPPLER COMPLETE: CPT | Performed by: INTERNAL MEDICINE

## 2021-05-18 PROCEDURE — 93925 LOWER EXTREMITY STUDY: CPT | Performed by: INTERNAL MEDICINE

## 2021-05-24 ENCOUNTER — TELEPHONE (OUTPATIENT)
Dept: CARDIOLOGY | Age: 76
End: 2021-05-24

## 2021-06-09 ENCOUNTER — LAB SERVICES (OUTPATIENT)
Dept: LAB | Age: 76
End: 2021-06-09

## 2021-06-09 DIAGNOSIS — I73.9 PAD (PERIPHERAL ARTERY DISEASE) (CMD): ICD-10-CM

## 2021-06-09 DIAGNOSIS — E78.2 HYPERLIPIDEMIA, MIXED: ICD-10-CM

## 2021-06-09 DIAGNOSIS — I71.40 ABDOMINAL AORTIC ANEURYSM WITHOUT RUPTURE (CMD): ICD-10-CM

## 2021-06-09 DIAGNOSIS — I71.40 ABDOMINAL AORTIC ANEURYSM (AAA) WITHOUT RUPTURE (CMD): ICD-10-CM

## 2021-06-09 DIAGNOSIS — I25.10 CAD IN NATIVE ARTERY: ICD-10-CM

## 2021-06-09 DIAGNOSIS — E78.2 MIXED HYPERLIPIDEMIA: ICD-10-CM

## 2021-06-09 DIAGNOSIS — I73.9 PERIPHERAL VASCULAR DISEASE, UNSPECIFIED (CMD): Primary | ICD-10-CM

## 2021-06-09 DIAGNOSIS — I25.10 ATHEROSCLEROSIS OF NATIVE CORONARY ARTERY WITHOUT ANGINA PECTORIS, UNSPECIFIED WHETHER NATIVE OR TRANSPLANTED HEART: ICD-10-CM

## 2021-06-09 LAB
ALT SERPL-CCNC: 26 UNITS/L
ANION GAP SERPL CALC-SCNC: 8 MMOL/L (ref 10–20)
AST SERPL-CCNC: 23 UNITS/L
BUN SERPL-MCNC: 10 MG/DL (ref 6–20)
BUN/CREAT SERPL: 11 (ref 7–25)
CALCIUM SERPL-MCNC: 9.6 MG/DL (ref 8.4–10.2)
CHLORIDE SERPL-SCNC: 103 MMOL/L (ref 98–107)
CHOLEST SERPL-MCNC: 99 MG/DL
CHOLEST/HDLC SERPL: 1.8 {RATIO}
CO2 SERPL-SCNC: 28 MMOL/L (ref 21–32)
CREAT SERPL-MCNC: 0.91 MG/DL (ref 0.51–0.95)
FASTING DURATION TIME PATIENT: 12 HOURS
FASTING DURATION TIME PATIENT: 12 HOURS
GFR SERPLBLD BASED ON 1.73 SQ M-ARVRAT: 61 ML/MIN/1.73M2
GLUCOSE SERPL-MCNC: 93 MG/DL (ref 65–99)
HBA1C MFR BLD: 5.4 % (ref 4.5–5.6)
HDLC SERPL-MCNC: 55 MG/DL
LDLC SERPL CALC-MCNC: 29 MG/DL
NONHDLC SERPL-MCNC: 44 MG/DL
POTASSIUM SERPL-SCNC: 4.1 MMOL/L (ref 3.4–5.1)
RAINBOW EXTRA TUBES HOLD SPECIMEN: NORMAL
SODIUM SERPL-SCNC: 135 MMOL/L (ref 135–145)
TRIGL SERPL-MCNC: 73 MG/DL

## 2021-06-09 PROCEDURE — 80048 BASIC METABOLIC PNL TOTAL CA: CPT | Performed by: CLINICAL MEDICAL LABORATORY

## 2021-06-09 PROCEDURE — 84460 ALANINE AMINO (ALT) (SGPT): CPT | Performed by: CLINICAL MEDICAL LABORATORY

## 2021-06-09 PROCEDURE — 83036 HEMOGLOBIN GLYCOSYLATED A1C: CPT | Performed by: CLINICAL MEDICAL LABORATORY

## 2021-06-09 PROCEDURE — 84450 TRANSFERASE (AST) (SGOT): CPT | Performed by: CLINICAL MEDICAL LABORATORY

## 2021-06-09 PROCEDURE — 80061 LIPID PANEL: CPT | Performed by: CLINICAL MEDICAL LABORATORY

## 2021-06-09 PROCEDURE — 36415 COLL VENOUS BLD VENIPUNCTURE: CPT | Performed by: CLINICAL MEDICAL LABORATORY

## 2021-07-07 DIAGNOSIS — E78.2 HYPERLIPIDEMIA, MIXED: ICD-10-CM

## 2021-07-07 DIAGNOSIS — I25.10 CAD IN NATIVE ARTERY: ICD-10-CM

## 2021-07-12 RX ORDER — EZETIMIBE 10 MG/1
TABLET ORAL
Qty: 90 TABLET | Refills: 3 | Status: SHIPPED | OUTPATIENT
Start: 2021-07-12 | End: 2021-07-15 | Stop reason: ALTCHOICE

## 2021-07-14 ASSESSMENT — ENCOUNTER SYMPTOMS
HEMOPTYSIS: 0
WEIGHT GAIN: 0
FEVER: 0
WEIGHT LOSS: 0
SUSPICIOUS LESIONS: 0
BRUISES/BLEEDS EASILY: 0
CHILLS: 0
ALLERGIC/IMMUNOLOGIC COMMENTS: NO NEW FOOD ALLERGIES
COUGH: 0
HEMATOCHEZIA: 0

## 2021-07-15 ENCOUNTER — OFFICE VISIT (OUTPATIENT)
Dept: CARDIOLOGY | Age: 76
End: 2021-07-15

## 2021-07-15 VITALS
HEART RATE: 70 BPM | SYSTOLIC BLOOD PRESSURE: 132 MMHG | WEIGHT: 142 LBS | DIASTOLIC BLOOD PRESSURE: 66 MMHG | RESPIRATION RATE: 18 BRPM | HEIGHT: 62 IN | BODY MASS INDEX: 26.13 KG/M2

## 2021-07-15 DIAGNOSIS — I10 ESSENTIAL HYPERTENSION: ICD-10-CM

## 2021-07-15 DIAGNOSIS — I71.40 ABDOMINAL AORTIC ANEURYSM (AAA) WITHOUT RUPTURE (CMD): ICD-10-CM

## 2021-07-15 DIAGNOSIS — E78.2 HYPERLIPIDEMIA, MIXED: ICD-10-CM

## 2021-07-15 DIAGNOSIS — I27.20 PULMONARY HTN (CMD): ICD-10-CM

## 2021-07-15 DIAGNOSIS — I25.10 CAD IN NATIVE ARTERY: Primary | ICD-10-CM

## 2021-07-15 DIAGNOSIS — I73.9 PAD (PERIPHERAL ARTERY DISEASE) (CMD): ICD-10-CM

## 2021-07-15 DIAGNOSIS — Z82.49 FAMILY HISTORY OF CORONARY ARTERIOSCLEROSIS: ICD-10-CM

## 2021-07-15 DIAGNOSIS — R07.2 CHEST PAIN, PRECORDIAL: ICD-10-CM

## 2021-07-15 DIAGNOSIS — R00.2 PALPITATIONS: ICD-10-CM

## 2021-07-15 PROCEDURE — 99214 OFFICE O/P EST MOD 30 MIN: CPT | Performed by: INTERNAL MEDICINE

## 2021-07-15 RX ORDER — CLOPIDOGREL BISULFATE 75 MG/1
75 TABLET ORAL DAILY
Qty: 90 TABLET | Refills: 3 | Status: SHIPPED | OUTPATIENT
Start: 2021-07-15 | End: 2021-12-13

## 2021-07-15 ASSESSMENT — PATIENT HEALTH QUESTIONNAIRE - PHQ9
SUM OF ALL RESPONSES TO PHQ9 QUESTIONS 1 AND 2: 0
CLINICAL INTERPRETATION OF PHQ2 SCORE: NO FURTHER SCREENING NEEDED
2. FEELING DOWN, DEPRESSED OR HOPELESS: NOT AT ALL
1. LITTLE INTEREST OR PLEASURE IN DOING THINGS: NOT AT ALL
SUM OF ALL RESPONSES TO PHQ9 QUESTIONS 1 AND 2: 0
CLINICAL INTERPRETATION OF PHQ9 SCORE: NO FURTHER SCREENING NEEDED

## 2021-07-29 ENCOUNTER — E-ADVICE (OUTPATIENT)
Dept: CARDIOLOGY | Age: 76
End: 2021-07-29

## 2021-08-02 ENCOUNTER — ANCILLARY PROCEDURE (OUTPATIENT)
Dept: CARDIOLOGY | Age: 76
End: 2021-08-02
Attending: INTERNAL MEDICINE

## 2021-08-02 DIAGNOSIS — I74.5 ILIAC ARTERY OCCLUSION (CMD): ICD-10-CM

## 2021-08-02 PROCEDURE — 93975 VASCULAR STUDY: CPT | Performed by: INTERNAL MEDICINE

## 2021-08-25 ENCOUNTER — APPOINTMENT (OUTPATIENT)
Dept: CARDIOLOGY | Age: 76
End: 2021-08-25

## 2021-08-31 RX ORDER — ROSUVASTATIN CALCIUM 20 MG/1
TABLET, COATED ORAL
Qty: 90 TABLET | Refills: 3 | Status: SHIPPED | OUTPATIENT
Start: 2021-08-31 | End: 2022-07-22

## 2021-09-08 PROBLEM — J44.9 CHRONIC OBSTRUCTIVE PULMONARY DISEASE, UNSPECIFIED COPD TYPE (HCC): Status: ACTIVE | Noted: 2021-09-08

## 2021-09-27 ENCOUNTER — OFFICE VISIT (OUTPATIENT)
Dept: CARDIOLOGY | Age: 76
End: 2021-09-27

## 2021-09-27 VITALS
HEART RATE: 70 BPM | SYSTOLIC BLOOD PRESSURE: 130 MMHG | BODY MASS INDEX: 25.65 KG/M2 | RESPIRATION RATE: 16 BRPM | DIASTOLIC BLOOD PRESSURE: 77 MMHG | HEIGHT: 62 IN | WEIGHT: 139.4 LBS

## 2021-09-27 DIAGNOSIS — I73.9 CLAUDICATION (CMD): Primary | ICD-10-CM

## 2021-09-27 PROCEDURE — 99214 OFFICE O/P EST MOD 30 MIN: CPT | Performed by: INTERNAL MEDICINE

## 2021-09-27 SDOH — HEALTH STABILITY: PHYSICAL HEALTH: ON AVERAGE, HOW MANY DAYS PER WEEK DO YOU ENGAGE IN MODERATE TO STRENUOUS EXERCISE (LIKE A BRISK WALK)?: 0 DAYS

## 2021-09-27 SDOH — HEALTH STABILITY: PHYSICAL HEALTH: ON AVERAGE, HOW MANY DAYS PER WEEK DO YOU ENGAGE IN MODERATE TO STRENUOUS EXERCISE (LIKE A BRISK WALK)?: 7 DAYS

## 2021-09-27 ASSESSMENT — PATIENT HEALTH QUESTIONNAIRE - PHQ9
CLINICAL INTERPRETATION OF PHQ2 SCORE: NO FURTHER SCREENING NEEDED
1. LITTLE INTEREST OR PLEASURE IN DOING THINGS: NOT AT ALL
SUM OF ALL RESPONSES TO PHQ9 QUESTIONS 1 AND 2: 0
SUM OF ALL RESPONSES TO PHQ9 QUESTIONS 1 AND 2: 0
CLINICAL INTERPRETATION OF PHQ9 SCORE: NO FURTHER SCREENING NEEDED
2. FEELING DOWN, DEPRESSED OR HOPELESS: NOT AT ALL

## 2021-12-08 ASSESSMENT — ENCOUNTER SYMPTOMS
SUSPICIOUS LESIONS: 0
FEVER: 0
COUGH: 0
HEMOPTYSIS: 0
CHILLS: 0
WEIGHT GAIN: 0
HEMATOCHEZIA: 0
ALLERGIC/IMMUNOLOGIC COMMENTS: NO NEW FOOD ALLERGIES
BRUISES/BLEEDS EASILY: 0
WEIGHT LOSS: 0

## 2021-12-09 ENCOUNTER — LAB SERVICES (OUTPATIENT)
Dept: LAB | Age: 76
End: 2021-12-09

## 2021-12-09 DIAGNOSIS — I10 ESSENTIAL HYPERTENSION: ICD-10-CM

## 2021-12-09 DIAGNOSIS — R00.2 PALPITATIONS: ICD-10-CM

## 2021-12-09 DIAGNOSIS — I27.20 PULMONARY HTN (CMD): ICD-10-CM

## 2021-12-09 DIAGNOSIS — I71.40 ABDOMINAL AORTIC ANEURYSM (AAA) WITHOUT RUPTURE (CMD): ICD-10-CM

## 2021-12-09 DIAGNOSIS — I73.9 PAD (PERIPHERAL ARTERY DISEASE) (CMD): ICD-10-CM

## 2021-12-09 LAB
ALT SERPL-CCNC: 29 UNITS/L
ANION GAP SERPL CALC-SCNC: 6 MMOL/L (ref 10–20)
AST SERPL-CCNC: 26 UNITS/L
BUN SERPL-MCNC: 15 MG/DL (ref 6–20)
BUN/CREAT SERPL: 16 (ref 7–25)
CALCIUM SERPL-MCNC: 10 MG/DL (ref 8.4–10.2)
CHLORIDE SERPL-SCNC: 101 MMOL/L (ref 98–107)
CHOLEST SERPL-MCNC: 114 MG/DL
CHOLEST/HDLC SERPL: 1.9 {RATIO}
CO2 SERPL-SCNC: 31 MMOL/L (ref 21–32)
CREAT SERPL-MCNC: 0.93 MG/DL (ref 0.51–0.95)
FASTING DURATION TIME PATIENT: ABNORMAL H
FASTING DURATION TIME PATIENT: NORMAL H
GFR SERPLBLD BASED ON 1.73 SQ M-ARVRAT: 59 ML/MIN
GLUCOSE SERPL-MCNC: 93 MG/DL (ref 70–99)
HDLC SERPL-MCNC: 59 MG/DL
LDLC SERPL CALC-MCNC: 39 MG/DL
NONHDLC SERPL-MCNC: 55 MG/DL
POTASSIUM SERPL-SCNC: 4.3 MMOL/L (ref 3.4–5.1)
SODIUM SERPL-SCNC: 134 MMOL/L (ref 135–145)
TRIGL SERPL-MCNC: 79 MG/DL

## 2021-12-09 PROCEDURE — 84460 ALANINE AMINO (ALT) (SGPT): CPT | Performed by: CLINICAL MEDICAL LABORATORY

## 2021-12-09 PROCEDURE — 84450 TRANSFERASE (AST) (SGOT): CPT | Performed by: CLINICAL MEDICAL LABORATORY

## 2021-12-09 PROCEDURE — 36415 COLL VENOUS BLD VENIPUNCTURE: CPT | Performed by: CLINICAL MEDICAL LABORATORY

## 2021-12-09 PROCEDURE — 80048 BASIC METABOLIC PNL TOTAL CA: CPT | Performed by: CLINICAL MEDICAL LABORATORY

## 2021-12-09 PROCEDURE — 80061 LIPID PANEL: CPT | Performed by: CLINICAL MEDICAL LABORATORY

## 2021-12-13 ENCOUNTER — OFFICE VISIT (OUTPATIENT)
Dept: CARDIOLOGY | Age: 76
End: 2021-12-13

## 2021-12-13 VITALS
SYSTOLIC BLOOD PRESSURE: 130 MMHG | DIASTOLIC BLOOD PRESSURE: 80 MMHG | HEART RATE: 84 BPM | BODY MASS INDEX: 25.84 KG/M2 | WEIGHT: 139 LBS

## 2021-12-13 DIAGNOSIS — I25.10 CAD IN NATIVE ARTERY: Primary | ICD-10-CM

## 2021-12-13 DIAGNOSIS — E78.2 HYPERLIPIDEMIA, MIXED: ICD-10-CM

## 2021-12-13 DIAGNOSIS — I10 ESSENTIAL HYPERTENSION: ICD-10-CM

## 2021-12-13 DIAGNOSIS — Z82.49 FAMILY HISTORY OF CORONARY ARTERIOSCLEROSIS: ICD-10-CM

## 2021-12-13 DIAGNOSIS — I73.9 PAD (PERIPHERAL ARTERY DISEASE) (CMD): ICD-10-CM

## 2021-12-13 DIAGNOSIS — I71.40 ABDOMINAL AORTIC ANEURYSM (AAA) WITHOUT RUPTURE (CMD): ICD-10-CM

## 2021-12-13 DIAGNOSIS — I27.20 PULMONARY HTN (CMD): ICD-10-CM

## 2021-12-13 PROCEDURE — 99214 OFFICE O/P EST MOD 30 MIN: CPT | Performed by: INTERNAL MEDICINE

## 2022-04-19 ENCOUNTER — APPOINTMENT (OUTPATIENT)
Dept: CARDIOLOGY | Age: 77
End: 2022-04-19
Attending: INTERNAL MEDICINE

## 2022-05-05 ENCOUNTER — ANCILLARY PROCEDURE (OUTPATIENT)
Dept: CARDIOLOGY | Age: 77
End: 2022-05-05
Attending: INTERNAL MEDICINE

## 2022-05-05 DIAGNOSIS — I73.9 CLAUDICATION (CMD): ICD-10-CM

## 2022-05-05 PROCEDURE — 93975 VASCULAR STUDY: CPT | Performed by: INTERNAL MEDICINE

## 2022-05-20 ENCOUNTER — OFFICE VISIT (OUTPATIENT)
Dept: CARDIOLOGY | Age: 77
End: 2022-05-20

## 2022-05-20 VITALS
WEIGHT: 142 LBS | BODY MASS INDEX: 26.13 KG/M2 | HEART RATE: 68 BPM | HEIGHT: 62 IN | SYSTOLIC BLOOD PRESSURE: 136 MMHG | DIASTOLIC BLOOD PRESSURE: 64 MMHG

## 2022-05-20 DIAGNOSIS — I25.10 CAD IN NATIVE ARTERY: ICD-10-CM

## 2022-05-20 DIAGNOSIS — I73.9 PAD (PERIPHERAL ARTERY DISEASE) (CMD): ICD-10-CM

## 2022-05-20 DIAGNOSIS — E78.2 HYPERLIPIDEMIA, MIXED: ICD-10-CM

## 2022-05-20 DIAGNOSIS — I73.9 CLAUDICATION (CMD): Primary | ICD-10-CM

## 2022-05-20 DIAGNOSIS — R06.02 SHORTNESS OF BREATH: ICD-10-CM

## 2022-05-20 DIAGNOSIS — I10 ESSENTIAL HYPERTENSION: ICD-10-CM

## 2022-05-20 DIAGNOSIS — I71.40 ABDOMINAL AORTIC ANEURYSM (AAA) WITHOUT RUPTURE (CMD): ICD-10-CM

## 2022-05-20 PROCEDURE — 99214 OFFICE O/P EST MOD 30 MIN: CPT | Performed by: INTERNAL MEDICINE

## 2022-07-22 RX ORDER — ROSUVASTATIN CALCIUM 20 MG/1
TABLET, COATED ORAL
Qty: 90 TABLET | Refills: 0 | Status: SHIPPED | OUTPATIENT
Start: 2022-07-22

## 2022-09-14 ENCOUNTER — LAB SERVICES (OUTPATIENT)
Dept: LAB | Age: 77
End: 2022-09-14

## 2022-09-14 DIAGNOSIS — E78.2 HYPERLIPIDEMIA, MIXED: ICD-10-CM

## 2022-09-14 DIAGNOSIS — I73.9 PAD (PERIPHERAL ARTERY DISEASE) (CMD): ICD-10-CM

## 2022-09-14 DIAGNOSIS — I10 ESSENTIAL HYPERTENSION: ICD-10-CM

## 2022-09-14 DIAGNOSIS — I25.10 CAD IN NATIVE ARTERY: ICD-10-CM

## 2022-09-14 DIAGNOSIS — Z82.49 FAMILY HISTORY OF CORONARY ARTERIOSCLEROSIS: ICD-10-CM

## 2022-09-14 LAB
ALT SERPL-CCNC: 28 UNITS/L
ANION GAP SERPL CALC-SCNC: 9 MMOL/L (ref 7–19)
AST SERPL-CCNC: 24 UNITS/L
BUN SERPL-MCNC: 15 MG/DL (ref 6–20)
BUN/CREAT SERPL: 17 (ref 7–25)
CALCIUM SERPL-MCNC: 9.9 MG/DL (ref 8.4–10.2)
CHLORIDE SERPL-SCNC: 98 MMOL/L (ref 97–110)
CHOLEST SERPL-MCNC: 119 MG/DL
CHOLEST/HDLC SERPL: 2.1 {RATIO}
CO2 SERPL-SCNC: 27 MMOL/L (ref 21–32)
CREAT SERPL-MCNC: 0.88 MG/DL (ref 0.51–0.95)
FASTING DURATION TIME PATIENT: 12 HOURS (ref 0–999)
FASTING DURATION TIME PATIENT: 12 HOURS (ref 0–999)
GFR SERPLBLD BASED ON 1.73 SQ M-ARVRAT: 67 ML/MIN
GLUCOSE SERPL-MCNC: 96 MG/DL (ref 70–99)
HDLC SERPL-MCNC: 56 MG/DL
LDLC SERPL CALC-MCNC: 43 MG/DL
NONHDLC SERPL-MCNC: 63 MG/DL
POTASSIUM SERPL-SCNC: 4.1 MMOL/L (ref 3.4–5.1)
SODIUM SERPL-SCNC: 130 MMOL/L (ref 135–145)
TRIGL SERPL-MCNC: 102 MG/DL

## 2022-09-14 PROCEDURE — 36415 COLL VENOUS BLD VENIPUNCTURE: CPT | Performed by: CLINICAL MEDICAL LABORATORY

## 2022-09-14 PROCEDURE — 84460 ALANINE AMINO (ALT) (SGPT): CPT | Performed by: CLINICAL MEDICAL LABORATORY

## 2022-09-14 PROCEDURE — 84450 TRANSFERASE (AST) (SGOT): CPT | Performed by: CLINICAL MEDICAL LABORATORY

## 2022-09-14 PROCEDURE — 80061 LIPID PANEL: CPT | Performed by: CLINICAL MEDICAL LABORATORY

## 2022-09-14 PROCEDURE — 80048 BASIC METABOLIC PNL TOTAL CA: CPT | Performed by: CLINICAL MEDICAL LABORATORY

## 2022-09-16 ENCOUNTER — TELEPHONE (OUTPATIENT)
Dept: CARDIOLOGY | Age: 77
End: 2022-09-16

## 2022-09-16 DIAGNOSIS — I25.10 CAD IN NATIVE ARTERY: Primary | ICD-10-CM

## 2022-10-10 ENCOUNTER — E-ADVICE (OUTPATIENT)
Dept: CARDIOLOGY | Age: 77
End: 2022-10-10

## 2022-10-10 ENCOUNTER — APPOINTMENT (OUTPATIENT)
Dept: CARDIOLOGY | Age: 77
End: 2022-10-10

## 2022-10-13 ENCOUNTER — APPOINTMENT (OUTPATIENT)
Dept: CARDIOLOGY | Age: 77
End: 2022-10-13

## 2022-10-26 ASSESSMENT — ENCOUNTER SYMPTOMS
WEIGHT GAIN: 0
SUSPICIOUS LESIONS: 0
FEVER: 0
WEIGHT LOSS: 0
HEMOPTYSIS: 0
HEMATOCHEZIA: 0
BRUISES/BLEEDS EASILY: 0
COUGH: 0
CHILLS: 0
ALLERGIC/IMMUNOLOGIC COMMENTS: NO NEW FOOD ALLERGIES

## 2022-10-28 ENCOUNTER — OFFICE VISIT (OUTPATIENT)
Dept: CARDIOLOGY | Age: 77
End: 2022-10-28

## 2022-10-28 ENCOUNTER — TELEPHONE (OUTPATIENT)
Dept: CARDIOLOGY | Age: 77
End: 2022-10-28

## 2022-10-28 VITALS
SYSTOLIC BLOOD PRESSURE: 180 MMHG | HEIGHT: 61 IN | RESPIRATION RATE: 18 BRPM | BODY MASS INDEX: 26.45 KG/M2 | HEART RATE: 66 BPM | DIASTOLIC BLOOD PRESSURE: 74 MMHG | WEIGHT: 140.1 LBS

## 2022-10-28 DIAGNOSIS — I77.811 AORTIC ECTASIA, ABDOMINAL (CMD): ICD-10-CM

## 2022-10-28 DIAGNOSIS — I73.9 PAD (PERIPHERAL ARTERY DISEASE) (CMD): ICD-10-CM

## 2022-10-28 DIAGNOSIS — I27.20 PULMONARY HTN (CMD): ICD-10-CM

## 2022-10-28 DIAGNOSIS — I10 ESSENTIAL HYPERTENSION: ICD-10-CM

## 2022-10-28 DIAGNOSIS — Z82.49 FAMILY HISTORY OF CORONARY ARTERIOSCLEROSIS: ICD-10-CM

## 2022-10-28 DIAGNOSIS — E78.2 HYPERLIPIDEMIA, MIXED: ICD-10-CM

## 2022-10-28 DIAGNOSIS — I25.10 CAD IN NATIVE ARTERY: Primary | ICD-10-CM

## 2022-10-28 PROCEDURE — 99214 OFFICE O/P EST MOD 30 MIN: CPT | Performed by: INTERNAL MEDICINE

## 2022-10-28 SDOH — HEALTH STABILITY: PHYSICAL HEALTH: ON AVERAGE, HOW MANY MINUTES DO YOU ENGAGE IN EXERCISE AT THIS LEVEL?: 30 MIN

## 2022-10-28 SDOH — HEALTH STABILITY: PHYSICAL HEALTH: ON AVERAGE, HOW MANY DAYS PER WEEK DO YOU ENGAGE IN MODERATE TO STRENUOUS EXERCISE (LIKE A BRISK WALK)?: 7 DAYS

## 2022-10-28 ASSESSMENT — PATIENT HEALTH QUESTIONNAIRE - PHQ9
1. LITTLE INTEREST OR PLEASURE IN DOING THINGS: NOT AT ALL
SUM OF ALL RESPONSES TO PHQ9 QUESTIONS 1 AND 2: 0
2. FEELING DOWN, DEPRESSED OR HOPELESS: NOT AT ALL
SUM OF ALL RESPONSES TO PHQ9 QUESTIONS 1 AND 2: 0
CLINICAL INTERPRETATION OF PHQ2 SCORE: NO FURTHER SCREENING NEEDED

## 2023-05-12 ENCOUNTER — ANCILLARY PROCEDURE (OUTPATIENT)
Dept: CARDIOLOGY | Age: 78
End: 2023-05-12
Attending: INTERNAL MEDICINE

## 2023-05-12 DIAGNOSIS — I73.9 CLAUDICATION (CMD): ICD-10-CM

## 2023-05-12 PROCEDURE — 93975 VASCULAR STUDY: CPT | Performed by: INTERNAL MEDICINE

## 2023-05-12 PROCEDURE — 93925 LOWER EXTREMITY STUDY: CPT | Performed by: INTERNAL MEDICINE

## 2023-06-30 ENCOUNTER — OFFICE VISIT (OUTPATIENT)
Dept: CARDIOLOGY | Age: 78
End: 2023-06-30

## 2023-06-30 VITALS
DIASTOLIC BLOOD PRESSURE: 80 MMHG | BODY MASS INDEX: 26.39 KG/M2 | HEART RATE: 68 BPM | HEIGHT: 61 IN | RESPIRATION RATE: 16 BRPM | OXYGEN SATURATION: 96 % | WEIGHT: 139.77 LBS | SYSTOLIC BLOOD PRESSURE: 146 MMHG

## 2023-06-30 DIAGNOSIS — I73.9 CLAUDICATION (CMD): Primary | ICD-10-CM

## 2023-06-30 DIAGNOSIS — E78.2 HYPERLIPIDEMIA, MIXED: ICD-10-CM

## 2023-06-30 DIAGNOSIS — Z82.49 FAMILY HISTORY OF CORONARY ARTERIOSCLEROSIS: ICD-10-CM

## 2023-06-30 DIAGNOSIS — I10 ESSENTIAL HYPERTENSION: ICD-10-CM

## 2023-06-30 DIAGNOSIS — R00.2 PALPITATIONS: ICD-10-CM

## 2023-06-30 DIAGNOSIS — I73.9 PAD (PERIPHERAL ARTERY DISEASE) (CMD): ICD-10-CM

## 2023-06-30 DIAGNOSIS — R94.39 ABNORMAL CARDIOVASCULAR STRESS TEST: ICD-10-CM

## 2023-06-30 PROCEDURE — 99214 OFFICE O/P EST MOD 30 MIN: CPT | Performed by: INTERNAL MEDICINE

## 2023-06-30 SDOH — HEALTH STABILITY: MENTAL HEALTH: PHQ2 INTERPRETATION: NO FURTHER SCREENING NEEDED

## 2023-06-30 SDOH — HEALTH STABILITY: PHYSICAL HEALTH: ON AVERAGE, HOW MANY MINUTES DO YOU ENGAGE IN EXERCISE AT THIS LEVEL?: 40 MIN

## 2023-06-30 SDOH — HEALTH STABILITY: MENTAL HEALTH: DEPRESSION SCREENING SCORE: 0

## 2023-06-30 SDOH — HEALTH STABILITY: PHYSICAL HEALTH: ON AVERAGE, HOW MANY DAYS PER WEEK DO YOU ENGAGE IN MODERATE TO STRENUOUS EXERCISE (LIKE A BRISK WALK)?: 7 DAYS

## 2023-06-30 SDOH — HEALTH STABILITY: MENTAL HEALTH: FEELING DOWN, DEPRESSED OR HOPELESS?: NOT AT ALL

## 2023-06-30 SDOH — HEALTH STABILITY: MENTAL HEALTH: LITTLE INTEREST OR PLEASURE IN ACTIVITY?: NOT AT ALL

## 2023-06-30 ASSESSMENT — PATIENT HEALTH QUESTIONNAIRE - PHQ9: SUM OF ALL RESPONSES TO PHQ9 QUESTIONS 1 AND 2: 0

## 2023-09-07 ENCOUNTER — ANCILLARY PROCEDURE (OUTPATIENT)
Dept: CARDIOLOGY | Age: 78
End: 2023-09-07
Attending: INTERNAL MEDICINE

## 2023-09-07 DIAGNOSIS — I77.811 AORTIC ECTASIA, ABDOMINAL (CMD): ICD-10-CM

## 2023-09-07 DIAGNOSIS — Z82.49 FAMILY HISTORY OF CORONARY ARTERIOSCLEROSIS: ICD-10-CM

## 2023-09-07 DIAGNOSIS — I10 ESSENTIAL HYPERTENSION: ICD-10-CM

## 2023-09-07 DIAGNOSIS — E78.2 HYPERLIPIDEMIA, MIXED: ICD-10-CM

## 2023-09-07 DIAGNOSIS — I27.20 PULMONARY HTN (CMD): ICD-10-CM

## 2023-09-07 DIAGNOSIS — I73.9 PAD (PERIPHERAL ARTERY DISEASE) (CMD): ICD-10-CM

## 2023-09-07 DIAGNOSIS — I25.10 CAD IN NATIVE ARTERY: ICD-10-CM

## 2023-09-07 LAB
AORTIC VALVE AREA (AVA): 0.67
ASCENDING AORTA (AAD): 3
AV PEAK GRADIENT (AVPG): 10
AV PEAK VELOCITY (AVPV): 1.62
AV STENOSIS SEVERITY TEXT: NORMAL
AVI LVOT PEAK GRADIENT (LVOTMG): 1.1
E WAVE DECELARATION TIME (MDT): 13.9
INTERVENTRICULAR SEPTUM IN END DIASTOLE (IVSD): 2.77
LEFT INTERNAL DIMENSION IN SYSTOLE (LVSD): 1.1
LEFT VENTRICULAR INTERNAL DIMENSION IN DIASTOLE (LVDD): 2
LEFT VENTRICULAR POSTERIOR WALL IN END DIASTOLE (LVPW): 4.1
LV EF: NORMAL %
LVOT VTI (LVOTVTI): 1.02
MV E TISSUE VEL MED (MESV): 6.68
MV E WAVE VEL/E TISSUE VEL MED(MSR): 4.8
MV PEAK A VELOCITY (MVPAV): 322
MV PEAK E VELOCITY (MVPEV): 1.18
RV END SYSTOLIC LONGITUDINAL STRAIN FREE WALL (RVGS): 2
TRICUSPID VALVE ANNULAR PEAK VELOCITY (TVAPV): 19
TRICUSPID VALVE PEAK REGURGITATION VELOCITY (TRPV): 2.9
TV ESTIMATED RIGHT ARTERIAL PRESSURE (RAP): 13.9

## 2023-09-07 PROCEDURE — 76376 3D RENDER W/INTRP POSTPROCES: CPT | Performed by: INTERNAL MEDICINE

## 2023-09-07 PROCEDURE — 93306 TTE W/DOPPLER COMPLETE: CPT | Performed by: INTERNAL MEDICINE

## 2023-10-09 ENCOUNTER — E-ADVICE (OUTPATIENT)
Dept: CARDIOLOGY | Age: 78
End: 2023-10-09

## 2023-10-25 ENCOUNTER — LAB SERVICES (OUTPATIENT)
Dept: LAB | Age: 78
End: 2023-10-25

## 2023-10-25 DIAGNOSIS — I73.9 PAD (PERIPHERAL ARTERY DISEASE) (CMD): ICD-10-CM

## 2023-10-25 DIAGNOSIS — E78.2 HYPERLIPIDEMIA, MIXED: ICD-10-CM

## 2023-10-25 DIAGNOSIS — I10 ESSENTIAL HYPERTENSION: ICD-10-CM

## 2023-10-25 DIAGNOSIS — I77.811 AORTIC ECTASIA, ABDOMINAL (CMD): ICD-10-CM

## 2023-10-25 DIAGNOSIS — I27.20 PULMONARY HTN (CMD): ICD-10-CM

## 2023-10-25 LAB
ALBUMIN SERPL-MCNC: 4 G/DL (ref 3.6–5.1)
ALBUMIN/GLOB SERPL: 1.1 {RATIO} (ref 1–2.4)
ALP SERPL-CCNC: 95 UNITS/L (ref 45–117)
ALT SERPL-CCNC: 29 UNITS/L
ANION GAP SERPL CALC-SCNC: 8 MMOL/L (ref 7–19)
AST SERPL-CCNC: 24 UNITS/L
BILIRUB SERPL-MCNC: 0.8 MG/DL (ref 0.2–1)
BUN SERPL-MCNC: 20 MG/DL (ref 6–20)
BUN/CREAT SERPL: 22 (ref 7–25)
CALCIUM SERPL-MCNC: 9.5 MG/DL (ref 8.4–10.2)
CHLORIDE SERPL-SCNC: 100 MMOL/L (ref 97–110)
CHOLEST SERPL-MCNC: 125 MG/DL
CHOLEST/HDLC SERPL: 2.2 {RATIO}
CO2 SERPL-SCNC: 29 MMOL/L (ref 21–32)
CREAT SERPL-MCNC: 0.93 MG/DL (ref 0.51–0.95)
EGFRCR SERPLBLD CKD-EPI 2021: 63 ML/MIN/{1.73_M2}
FASTING DURATION TIME PATIENT: 12 HOURS (ref 0–999)
GLOBULIN SER-MCNC: 3.5 G/DL (ref 2–4)
GLUCOSE SERPL-MCNC: 101 MG/DL (ref 70–99)
HBA1C MFR BLD: 5.2 % (ref 4.5–5.6)
HDLC SERPL-MCNC: 57 MG/DL
LDLC SERPL CALC-MCNC: 50 MG/DL
NONHDLC SERPL-MCNC: 68 MG/DL
POTASSIUM SERPL-SCNC: 4.2 MMOL/L (ref 3.4–5.1)
PROT SERPL-MCNC: 7.5 G/DL (ref 6.4–8.2)
SODIUM SERPL-SCNC: 133 MMOL/L (ref 135–145)
TRIGL SERPL-MCNC: 91 MG/DL

## 2023-10-25 PROCEDURE — 80053 COMPREHEN METABOLIC PANEL: CPT | Performed by: CLINICAL MEDICAL LABORATORY

## 2023-10-25 PROCEDURE — 83036 HEMOGLOBIN GLYCOSYLATED A1C: CPT | Performed by: CLINICAL MEDICAL LABORATORY

## 2023-10-25 PROCEDURE — 36415 COLL VENOUS BLD VENIPUNCTURE: CPT | Performed by: CLINICAL MEDICAL LABORATORY

## 2023-10-25 PROCEDURE — 80061 LIPID PANEL: CPT | Performed by: CLINICAL MEDICAL LABORATORY

## 2023-10-26 ASSESSMENT — ENCOUNTER SYMPTOMS
BRUISES/BLEEDS EASILY: 0
SUSPICIOUS LESIONS: 0
COUGH: 0
WEIGHT LOSS: 0
HEMATOCHEZIA: 0
FEVER: 0
CHILLS: 0
HEMOPTYSIS: 0
WEIGHT GAIN: 0
ALLERGIC/IMMUNOLOGIC COMMENTS: NO NEW FOOD ALLERGIES

## 2023-10-27 ENCOUNTER — OFFICE VISIT (OUTPATIENT)
Dept: CARDIOLOGY | Age: 78
End: 2023-10-27

## 2023-10-27 VITALS
WEIGHT: 138.78 LBS | BODY MASS INDEX: 26.2 KG/M2 | HEART RATE: 69 BPM | DIASTOLIC BLOOD PRESSURE: 68 MMHG | SYSTOLIC BLOOD PRESSURE: 136 MMHG | HEIGHT: 61 IN

## 2023-10-27 DIAGNOSIS — I27.20 PULMONARY HTN (CMD): ICD-10-CM

## 2023-10-27 DIAGNOSIS — R06.02 SHORTNESS OF BREATH: ICD-10-CM

## 2023-10-27 DIAGNOSIS — I25.10 CAD IN NATIVE ARTERY: ICD-10-CM

## 2023-10-27 DIAGNOSIS — Z82.49 FAMILY HISTORY OF CORONARY ARTERIOSCLEROSIS: ICD-10-CM

## 2023-10-27 DIAGNOSIS — I50.32 CHRONIC CONGESTIVE HEART FAILURE WITH LEFT VENTRICULAR DIASTOLIC DYSFUNCTION (CMD): ICD-10-CM

## 2023-10-27 DIAGNOSIS — I10 ESSENTIAL HYPERTENSION: ICD-10-CM

## 2023-10-27 DIAGNOSIS — I77.811 AORTIC ECTASIA, ABDOMINAL (CMD): Primary | ICD-10-CM

## 2023-10-27 PROCEDURE — 99214 OFFICE O/P EST MOD 30 MIN: CPT | Performed by: INTERNAL MEDICINE

## 2023-10-27 SDOH — HEALTH STABILITY: PHYSICAL HEALTH: ON AVERAGE, HOW MANY DAYS PER WEEK DO YOU ENGAGE IN MODERATE TO STRENUOUS EXERCISE (LIKE A BRISK WALK)?: 7 DAYS

## 2023-10-27 SDOH — HEALTH STABILITY: PHYSICAL HEALTH: ON AVERAGE, HOW MANY MINUTES DO YOU ENGAGE IN EXERCISE AT THIS LEVEL?: 30 MIN

## 2023-10-27 ASSESSMENT — PATIENT HEALTH QUESTIONNAIRE - PHQ9
SUM OF ALL RESPONSES TO PHQ9 QUESTIONS 1 AND 2: 0
CLINICAL INTERPRETATION OF PHQ2 SCORE: NO FURTHER SCREENING NEEDED
1. LITTLE INTEREST OR PLEASURE IN DOING THINGS: NOT AT ALL
2. FEELING DOWN, DEPRESSED OR HOPELESS: NOT AT ALL
SUM OF ALL RESPONSES TO PHQ9 QUESTIONS 1 AND 2: 0

## 2023-10-27 ASSESSMENT — ENCOUNTER SYMPTOMS: BACK PAIN: 1

## 2024-03-12 ENCOUNTER — HOSPITAL ENCOUNTER (INPATIENT)
Facility: HOSPITAL | Age: 79
LOS: 2 days | Discharge: HOME HEALTH | End: 2024-03-15
Attending: EMERGENCY MEDICINE | Admitting: INTERNAL MEDICINE
Payer: MEDICARE

## 2024-03-12 DIAGNOSIS — J18.9 PNEUMONIA OF RIGHT UPPER LOBE DUE TO INFECTIOUS ORGANISM: ICD-10-CM

## 2024-03-12 DIAGNOSIS — I26.99 ACUTE PULMONARY EMBOLISM WITHOUT ACUTE COR PULMONALE, UNSPECIFIED PULMONARY EMBOLISM TYPE (HCC): ICD-10-CM

## 2024-03-12 DIAGNOSIS — J96.01 ACUTE HYPOXEMIC RESPIRATORY FAILURE (HCC): Primary | ICD-10-CM

## 2024-03-12 LAB
ALBUMIN SERPL-MCNC: 3.8 G/DL (ref 3.4–5)
ALBUMIN/GLOB SERPL: 1.1 {RATIO} (ref 1–2)
ALP LIVER SERPL-CCNC: 112 U/L
ALT SERPL-CCNC: 19 U/L
ANION GAP SERPL CALC-SCNC: 9 MMOL/L (ref 0–18)
APTT PPP: 27.7 SECONDS (ref 23.3–35.6)
AST SERPL-CCNC: 41 U/L (ref 15–37)
BASOPHILS # BLD AUTO: 0.11 X10(3) UL (ref 0–0.2)
BASOPHILS NFR BLD AUTO: 0.8 %
BILIRUB SERPL-MCNC: 0.7 MG/DL (ref 0.1–2)
BUN BLD-MCNC: 11 MG/DL (ref 9–23)
CALCIUM BLD-MCNC: 9.5 MG/DL (ref 8.5–10.1)
CHLORIDE SERPL-SCNC: 98 MMOL/L (ref 98–112)
CO2 SERPL-SCNC: 23 MMOL/L (ref 21–32)
CREAT BLD-MCNC: 1.03 MG/DL
EGFRCR SERPLBLD CKD-EPI 2021: 55 ML/MIN/1.73M2 (ref 60–?)
EOSINOPHIL # BLD AUTO: 1.75 X10(3) UL (ref 0–0.7)
EOSINOPHIL NFR BLD AUTO: 12.6 %
ERYTHROCYTE [DISTWIDTH] IN BLOOD BY AUTOMATED COUNT: 12.7 %
GLOBULIN PLAS-MCNC: 3.6 G/DL (ref 2.8–4.4)
GLUCOSE BLD-MCNC: 102 MG/DL (ref 70–99)
HCT VFR BLD AUTO: 41.2 %
HGB BLD-MCNC: 14.3 G/DL
IMM GRANULOCYTES # BLD AUTO: 0.05 X10(3) UL (ref 0–1)
IMM GRANULOCYTES NFR BLD: 0.4 %
INR BLD: 0.91 (ref 0.8–1.2)
LACTATE SERPL-SCNC: 1.3 MMOL/L (ref 0.4–2)
LYMPHOCYTES # BLD AUTO: 1.15 X10(3) UL (ref 1–4)
LYMPHOCYTES NFR BLD AUTO: 8.3 %
MCH RBC QN AUTO: 31.6 PG (ref 26–34)
MCHC RBC AUTO-ENTMCNC: 34.7 G/DL (ref 31–37)
MCV RBC AUTO: 90.9 FL
MONOCYTES # BLD AUTO: 0.57 X10(3) UL (ref 0.1–1)
MONOCYTES NFR BLD AUTO: 4.1 %
NEUTROPHILS # BLD AUTO: 10.28 X10 (3) UL (ref 1.5–7.7)
NEUTROPHILS # BLD AUTO: 10.28 X10(3) UL (ref 1.5–7.7)
NEUTROPHILS NFR BLD AUTO: 73.8 %
NT-PROBNP SERPL-MCNC: 675 PG/ML (ref ?–450)
OSMOLALITY SERPL CALC.SUM OF ELEC: 270 MOSM/KG (ref 275–295)
PLATELET # BLD AUTO: 300 10(3)UL (ref 150–450)
POTASSIUM SERPL-SCNC: 4.6 MMOL/L (ref 3.5–5.1)
PROCALCITONIN SERPL-MCNC: <0.05 NG/ML (ref ?–0.16)
PROT SERPL-MCNC: 7.4 G/DL (ref 6.4–8.2)
PROTHROMBIN TIME: 12.3 SECONDS (ref 11.6–14.8)
RBC # BLD AUTO: 4.53 X10(6)UL
SODIUM SERPL-SCNC: 130 MMOL/L (ref 136–145)
TROPONIN I SERPL HS-MCNC: 11 NG/L
WBC # BLD AUTO: 13.9 X10(3) UL (ref 4–11)

## 2024-03-12 PROCEDURE — 83880 ASSAY OF NATRIURETIC PEPTIDE: CPT | Performed by: EMERGENCY MEDICINE

## 2024-03-12 PROCEDURE — 99285 EMERGENCY DEPT VISIT HI MDM: CPT

## 2024-03-12 PROCEDURE — 85025 COMPLETE CBC W/AUTO DIFF WBC: CPT | Performed by: EMERGENCY MEDICINE

## 2024-03-12 PROCEDURE — 80053 COMPREHEN METABOLIC PANEL: CPT | Performed by: EMERGENCY MEDICINE

## 2024-03-12 PROCEDURE — 87040 BLOOD CULTURE FOR BACTERIA: CPT | Performed by: EMERGENCY MEDICINE

## 2024-03-12 PROCEDURE — 93005 ELECTROCARDIOGRAM TRACING: CPT

## 2024-03-12 PROCEDURE — 96375 TX/PRO/DX INJ NEW DRUG ADDON: CPT

## 2024-03-12 PROCEDURE — 85730 THROMBOPLASTIN TIME PARTIAL: CPT | Performed by: EMERGENCY MEDICINE

## 2024-03-12 PROCEDURE — 36415 COLL VENOUS BLD VENIPUNCTURE: CPT

## 2024-03-12 PROCEDURE — 96368 THER/DIAG CONCURRENT INF: CPT

## 2024-03-12 PROCEDURE — 84484 ASSAY OF TROPONIN QUANT: CPT | Performed by: EMERGENCY MEDICINE

## 2024-03-12 PROCEDURE — 83605 ASSAY OF LACTIC ACID: CPT | Performed by: EMERGENCY MEDICINE

## 2024-03-12 PROCEDURE — 96366 THER/PROPH/DIAG IV INF ADDON: CPT

## 2024-03-12 PROCEDURE — 96365 THER/PROPH/DIAG IV INF INIT: CPT

## 2024-03-12 PROCEDURE — 85610 PROTHROMBIN TIME: CPT | Performed by: EMERGENCY MEDICINE

## 2024-03-12 PROCEDURE — 93010 ELECTROCARDIOGRAM REPORT: CPT

## 2024-03-12 PROCEDURE — 84145 PROCALCITONIN (PCT): CPT | Performed by: EMERGENCY MEDICINE

## 2024-03-12 RX ORDER — ONDANSETRON 2 MG/ML
4 INJECTION INTRAMUSCULAR; INTRAVENOUS ONCE
Status: COMPLETED | OUTPATIENT
Start: 2024-03-12 | End: 2024-03-12

## 2024-03-12 RX ORDER — HEPARIN SODIUM 1000 [USP'U]/ML
80 INJECTION, SOLUTION INTRAVENOUS; SUBCUTANEOUS ONCE
Status: COMPLETED | OUTPATIENT
Start: 2024-03-12 | End: 2024-03-12

## 2024-03-12 RX ORDER — MORPHINE SULFATE 4 MG/ML
4 INJECTION, SOLUTION INTRAMUSCULAR; INTRAVENOUS ONCE
Status: DISCONTINUED | OUTPATIENT
Start: 2024-03-12 | End: 2024-03-12

## 2024-03-12 RX ORDER — HEPARIN SODIUM AND DEXTROSE 10000; 5 [USP'U]/100ML; G/100ML
18 INJECTION INTRAVENOUS ONCE
Status: COMPLETED | OUTPATIENT
Start: 2024-03-12 | End: 2024-03-13

## 2024-03-12 RX ORDER — HEPARIN SODIUM AND DEXTROSE 10000; 5 [USP'U]/100ML; G/100ML
INJECTION INTRAVENOUS CONTINUOUS
Status: DISCONTINUED | OUTPATIENT
Start: 2024-03-13 | End: 2024-03-15

## 2024-03-13 ENCOUNTER — APPOINTMENT (OUTPATIENT)
Dept: CV DIAGNOSTICS | Facility: HOSPITAL | Age: 79
End: 2024-03-13
Attending: INTERNAL MEDICINE
Payer: MEDICARE

## 2024-03-13 ENCOUNTER — APPOINTMENT (OUTPATIENT)
Dept: ULTRASOUND IMAGING | Facility: HOSPITAL | Age: 79
End: 2024-03-13
Attending: INTERNAL MEDICINE
Payer: MEDICARE

## 2024-03-13 PROBLEM — J18.9 PNEUMONIA OF RIGHT UPPER LOBE DUE TO INFECTIOUS ORGANISM: Status: ACTIVE | Noted: 2024-03-13

## 2024-03-13 PROBLEM — I26.99 ACUTE PULMONARY EMBOLISM WITHOUT ACUTE COR PULMONALE, UNSPECIFIED PULMONARY EMBOLISM TYPE (HCC): Status: ACTIVE | Noted: 2024-03-13

## 2024-03-13 LAB
ANION GAP SERPL CALC-SCNC: 9 MMOL/L (ref 0–18)
APTT PPP: 121.1 SECONDS (ref 23.3–35.6)
APTT PPP: 75.6 SECONDS (ref 23.3–35.6)
ATRIAL RATE: 59 BPM
ATRIAL RATE: 75 BPM
BASOPHILS # BLD AUTO: 0.09 X10(3) UL (ref 0–0.2)
BASOPHILS NFR BLD AUTO: 0.9 %
BUN BLD-MCNC: 10 MG/DL (ref 9–23)
CALCIUM BLD-MCNC: 8.8 MG/DL (ref 8.5–10.1)
CHLORIDE SERPL-SCNC: 100 MMOL/L (ref 98–112)
CO2 SERPL-SCNC: 23 MMOL/L (ref 21–32)
CREAT BLD-MCNC: 0.61 MG/DL
EGFRCR SERPLBLD CKD-EPI 2021: 90 ML/MIN/1.73M2 (ref 60–?)
EOSINOPHIL # BLD AUTO: 0.92 X10(3) UL (ref 0–0.7)
EOSINOPHIL NFR BLD AUTO: 9.5 %
ERYTHROCYTE [DISTWIDTH] IN BLOOD BY AUTOMATED COUNT: 12.7 %
GLUCOSE BLD-MCNC: 137 MG/DL (ref 70–99)
HCT VFR BLD AUTO: 35.8 %
HGB BLD-MCNC: 12.4 G/DL
IMM GRANULOCYTES # BLD AUTO: 0.03 X10(3) UL (ref 0–1)
IMM GRANULOCYTES NFR BLD: 0.3 %
LYMPHOCYTES # BLD AUTO: 0.93 X10(3) UL (ref 1–4)
LYMPHOCYTES NFR BLD AUTO: 9.6 %
MAGNESIUM SERPL-MCNC: 1.8 MG/DL (ref 1.6–2.6)
MCH RBC QN AUTO: 31.6 PG (ref 26–34)
MCHC RBC AUTO-ENTMCNC: 34.6 G/DL (ref 31–37)
MCV RBC AUTO: 91.1 FL
MONOCYTES # BLD AUTO: 0.41 X10(3) UL (ref 0.1–1)
MONOCYTES NFR BLD AUTO: 4.2 %
NEUTROPHILS # BLD AUTO: 7.33 X10 (3) UL (ref 1.5–7.7)
NEUTROPHILS # BLD AUTO: 7.33 X10(3) UL (ref 1.5–7.7)
NEUTROPHILS NFR BLD AUTO: 75.5 %
OSMOLALITY SERPL CALC.SUM OF ELEC: 275 MOSM/KG (ref 275–295)
P AXIS: 51 DEGREES
P AXIS: 63 DEGREES
P-R INTERVAL: 186 MS
P-R INTERVAL: 190 MS
PLATELET # BLD AUTO: 250 10(3)UL (ref 150–450)
POTASSIUM SERPL-SCNC: 4 MMOL/L (ref 3.5–5.1)
Q-T INTERVAL: 394 MS
Q-T INTERVAL: 448 MS
QRS DURATION: 86 MS
QRS DURATION: 90 MS
QTC CALCULATION (BEZET): 439 MS
QTC CALCULATION (BEZET): 443 MS
R AXIS: 30 DEGREES
R AXIS: 59 DEGREES
RBC # BLD AUTO: 3.93 X10(6)UL
SODIUM SERPL-SCNC: 132 MMOL/L (ref 136–145)
T AXIS: 57 DEGREES
T AXIS: 74 DEGREES
VENTRICULAR RATE: 59 BPM
VENTRICULAR RATE: 75 BPM
WBC # BLD AUTO: 9.7 X10(3) UL (ref 4–11)

## 2024-03-13 PROCEDURE — 93306 TTE W/DOPPLER COMPLETE: CPT | Performed by: INTERNAL MEDICINE

## 2024-03-13 PROCEDURE — 93970 EXTREMITY STUDY: CPT | Performed by: INTERNAL MEDICINE

## 2024-03-13 PROCEDURE — 80048 BASIC METABOLIC PNL TOTAL CA: CPT | Performed by: INTERNAL MEDICINE

## 2024-03-13 PROCEDURE — 85730 THROMBOPLASTIN TIME PARTIAL: CPT | Performed by: INTERNAL MEDICINE

## 2024-03-13 PROCEDURE — 94640 AIRWAY INHALATION TREATMENT: CPT

## 2024-03-13 PROCEDURE — 83735 ASSAY OF MAGNESIUM: CPT | Performed by: INTERNAL MEDICINE

## 2024-03-13 PROCEDURE — 85025 COMPLETE CBC W/AUTO DIFF WBC: CPT | Performed by: INTERNAL MEDICINE

## 2024-03-13 RX ORDER — IPRATROPIUM BROMIDE AND ALBUTEROL SULFATE 2.5; .5 MG/3ML; MG/3ML
3 SOLUTION RESPIRATORY (INHALATION) EVERY 6 HOURS PRN
Status: DISCONTINUED | OUTPATIENT
Start: 2024-03-13 | End: 2024-03-15

## 2024-03-13 RX ORDER — METOPROLOL SUCCINATE 50 MG/1
50 TABLET, EXTENDED RELEASE ORAL DAILY
Status: DISCONTINUED | OUTPATIENT
Start: 2024-03-13 | End: 2024-03-15

## 2024-03-13 RX ORDER — HYDROCODONE BITARTRATE AND ACETAMINOPHEN 5; 325 MG/1; MG/1
2 TABLET ORAL EVERY 4 HOURS PRN
Status: DISCONTINUED | OUTPATIENT
Start: 2024-03-13 | End: 2024-03-15

## 2024-03-13 RX ORDER — HYDROCODONE BITARTRATE AND ACETAMINOPHEN 5; 325 MG/1; MG/1
1 TABLET ORAL EVERY 4 HOURS PRN
Status: DISCONTINUED | OUTPATIENT
Start: 2024-03-13 | End: 2024-03-15

## 2024-03-13 RX ORDER — MELATONIN
3 NIGHTLY PRN
Status: DISCONTINUED | OUTPATIENT
Start: 2024-03-13 | End: 2024-03-15

## 2024-03-13 RX ORDER — HYDRALAZINE HYDROCHLORIDE 20 MG/ML
10 INJECTION INTRAMUSCULAR; INTRAVENOUS EVERY 6 HOURS PRN
Status: DISCONTINUED | OUTPATIENT
Start: 2024-03-13 | End: 2024-03-15

## 2024-03-13 RX ORDER — POLYETHYLENE GLYCOL 3350 17 G/17G
17 POWDER, FOR SOLUTION ORAL DAILY PRN
Status: DISCONTINUED | OUTPATIENT
Start: 2024-03-13 | End: 2024-03-15

## 2024-03-13 RX ORDER — MORPHINE SULFATE 2 MG/ML
1 INJECTION, SOLUTION INTRAMUSCULAR; INTRAVENOUS EVERY 2 HOUR PRN
Status: DISCONTINUED | OUTPATIENT
Start: 2024-03-13 | End: 2024-03-15

## 2024-03-13 RX ORDER — ACETAMINOPHEN 325 MG/1
650 TABLET ORAL EVERY 4 HOURS PRN
Status: DISCONTINUED | OUTPATIENT
Start: 2024-03-13 | End: 2024-03-15

## 2024-03-13 RX ORDER — MORPHINE SULFATE 2 MG/ML
2 INJECTION, SOLUTION INTRAMUSCULAR; INTRAVENOUS EVERY 2 HOUR PRN
Status: DISCONTINUED | OUTPATIENT
Start: 2024-03-13 | End: 2024-03-15

## 2024-03-13 RX ORDER — ONDANSETRON 2 MG/ML
4 INJECTION INTRAMUSCULAR; INTRAVENOUS EVERY 4 HOURS PRN
Status: ACTIVE | OUTPATIENT
Start: 2024-03-13 | End: 2024-03-13

## 2024-03-13 RX ORDER — METOCLOPRAMIDE HYDROCHLORIDE 5 MG/ML
5 INJECTION INTRAMUSCULAR; INTRAVENOUS EVERY 8 HOURS PRN
Status: DISCONTINUED | OUTPATIENT
Start: 2024-03-13 | End: 2024-03-15

## 2024-03-13 RX ORDER — MORPHINE SULFATE 4 MG/ML
4 INJECTION, SOLUTION INTRAMUSCULAR; INTRAVENOUS EVERY 2 HOUR PRN
Status: DISCONTINUED | OUTPATIENT
Start: 2024-03-13 | End: 2024-03-15

## 2024-03-13 RX ORDER — FLUTICASONE FUROATE AND VILANTEROL 100; 25 UG/1; UG/1
1 POWDER RESPIRATORY (INHALATION) DAILY
Status: DISCONTINUED | OUTPATIENT
Start: 2024-03-13 | End: 2024-03-15

## 2024-03-13 RX ORDER — ONDANSETRON 2 MG/ML
4 INJECTION INTRAMUSCULAR; INTRAVENOUS EVERY 6 HOURS PRN
Status: DISCONTINUED | OUTPATIENT
Start: 2024-03-13 | End: 2024-03-15

## 2024-03-13 RX ORDER — ALBUTEROL SULFATE 90 UG/1
1 AEROSOL, METERED RESPIRATORY (INHALATION) EVERY 6 HOURS PRN
Status: DISCONTINUED | OUTPATIENT
Start: 2024-03-13 | End: 2024-03-15

## 2024-03-13 RX ORDER — BISACODYL 10 MG
10 SUPPOSITORY, RECTAL RECTAL
Status: DISCONTINUED | OUTPATIENT
Start: 2024-03-13 | End: 2024-03-15

## 2024-03-13 RX ORDER — ENEMA 19; 7 G/133ML; G/133ML
1 ENEMA RECTAL ONCE AS NEEDED
Status: DISCONTINUED | OUTPATIENT
Start: 2024-03-13 | End: 2024-03-15

## 2024-03-13 RX ORDER — ROSUVASTATIN CALCIUM 20 MG/1
20 TABLET, COATED ORAL NIGHTLY
Status: DISCONTINUED | OUTPATIENT
Start: 2024-03-13 | End: 2024-03-15

## 2024-03-13 RX ORDER — SENNOSIDES 8.6 MG
17.2 TABLET ORAL NIGHTLY PRN
Status: DISCONTINUED | OUTPATIENT
Start: 2024-03-13 | End: 2024-03-15

## 2024-03-13 RX ORDER — FUROSEMIDE 20 MG/1
20 TABLET ORAL EVERY OTHER DAY
Status: DISCONTINUED | OUTPATIENT
Start: 2024-03-13 | End: 2024-03-15

## 2024-03-13 RX ORDER — ASPIRIN 81 MG/1
81 TABLET ORAL DAILY
Status: DISCONTINUED | OUTPATIENT
Start: 2024-03-13 | End: 2024-03-15

## 2024-03-13 RX ORDER — MAGNESIUM OXIDE 400 MG/1
400 TABLET ORAL ONCE
Status: COMPLETED | OUTPATIENT
Start: 2024-03-13 | End: 2024-03-13

## 2024-03-13 NOTE — PROGRESS NOTES
[facility]  Hospitalist Progress Note                                                                     [facility]   [facility] [place]  12/21/1945    SUBJECTIVE:  Patient seen and examined.  Feeling slightly better than yesterday.  Dyspnea improving.  Daughter bedside.  NAD.       OBJECTIVE:  Temp:  [97.7 °F (36.5 °C)-98.5 °F (36.9 °C)] 97.8 °F (36.6 °C)  Pulse:  [63-86] 85  Resp:  [18-23] 22  BP: ()/(47-83) 148/52  SpO2:  [87 %-100 %] 97 %  FiO2 (%):  [60 %] 60 %  Exam  Gen: No acute distress, alert and oriented x3, no focal neurologic deficits  Pulm: Lungs clear bilaterally, normal respiratory effort  CV: Heart with regular rate and rhythm, no murmur.  Normal PMI.    Abd: Abdomen soft, nontender, nondistended, no organomegaly, bowel sounds present  MSK: Full range of motion in extremities, no clubbing, no cyanosis  Skin: no rashes or lesions    Labs:   Recent Labs   Lab 03/12/24 2156 03/13/24  0621   WBC 13.9* 9.7   HGB 14.3 12.4   MCV 90.9 91.1   .0 250.0   INR 0.91  --        Recent Labs   Lab 03/12/24 2156 03/13/24  0621   * 132*   K 4.6 4.0   CL 98 100   CO2 23.0 23.0   BUN 11 10   CREATSERUM 1.03* 0.61   CA 9.5 8.8   MG  --  1.8   * 137*       Recent Labs   Lab 03/12/24 2156   ALT 19   AST 41*   ALB 3.8       No results for input(s): \"PGLU\" in the last 168 hours.    Meds:   Scheduled:    piperacillin-tazobactam  3.375 g Intravenous Q8H    lidocaine-menthol  1 patch Transdermal Daily     Continuous Infusions:    continuous dose heparin 1,000 Units/hr (03/13/24 0730)     PRN: acetaminophen **OR** HYDROcodone-acetaminophen **OR** HYDROcodone-acetaminophen, morphINE **OR** morphINE **OR** morphINE, polyethylene glycol (PEG 3350), sennosides, bisacodyl, fleet enema, ondansetron, metoclopramide, ipratropium-albuterol, hydrALAzine    Assessment/Plan:  Principal Problem:    Acute hypoxemic  respiratory failure (HCC)  Active Problems:    Acute pulmonary embolism without acute cor pulmonale, unspecified pulmonary embolism type (HCC)    Pneumonia of right upper lobe due to infectious organism    78 year old female with PMH sig for Metastatic lung CA, COPD, AAA, CHF, PFO, PVD, HTN, HLD, GERD, who presents with shortness of breath.  Admitted for acute PE and pneumonia.     Acute hypoxic respiratory failure  History of metastatic lung cancer  History of COPD  Acute RUL segmental PE  PNA (low suspicion)   -Presents with shortness of breath  -hypoxic and normotensive.  -BNP elevated with normal troponin  -EKG in normal sinus rhythm  -Echo, lower extremity Doppler - results pending  -Heparin drip, cont 48h before transitioning to DOAC closer to dc   - low suspicion for PNA (clinical history not supportive of PNA and procal negative). Low threshold to dc Zosyn, d/w pulm   -Pulmonology on consult  -Supplemental oxygen, DuoNebs     AAA  CHF  PFO  PVD  HTN  - Asa, plavix, statin   - Lasix, lisinopril, metop      HLD - ezetimibe, statin    GERD - PPI     FEN: Regular diet, PT/OT   DVT Prophy: heparin drip   Full code         Jaz Mcdonald MD  [facility] Hospitalist  Message over [email or web address]  Pager: [phone]

## 2024-03-13 NOTE — PLAN OF CARE
Assumed patient care at 0730  Weaned to 2L NC  Tele; NSR  Echo and US doppler completed  Heparin gtt infusing per protocol; next PTT yudi am  Up to chair  Ambulating to bathroom  Family at bedside  Plan of care discussed with patient, family, and physicians.

## 2024-03-13 NOTE — CONSULTS
[facility] Pul[facility], [facility] Care [facility] Sleep    Mojgan Jefferson Patient Status:  Inpatient    1945 MRN [id number]   Location 7611/7611-A PCP Sunny Reyes MD     Date of Admission: 3/12/2024    History of Present Illness: Pt is a 78 year old female consulted for PE and abnormal CT with h/o metastatic lung CA, COPD, AAA, CHF, PFO, PVD, HTN, HLD, GERD , Abnormal CXR and then CT 2023. Ultimatley bx of suprclav LN 2024 with adenocarcinoma. Had been seen in [place] then eval with televisti with oncolgist at [facility]. Has noted dypsnea sudden onset that started 5 days ago that was persisting with hypoxia and prompted CT scan showing upper lobe PE and possible pneumonia prompting eval in ED.   Pt currently on heparin gtt.     PMH:    Past Medical History:   Diagnosis Date    AAA (abdominal aortic aneurysm) (HCC) 2020    3x3.4 cm, discussed with pt 9/3/20, recheck yearly, quit tob in , treat risk factors.     Abnormal echocardiogram     possible pulm htn, went to [place] for eval 17 and w/u was negative, note received 18    Abnormal stress echo 08/15/2016    saw [place] 7/15/21, 21    Agatston coronary artery calcium score less than 100 2017    72 [place], sees Dr. Bellamy, last visit 7/15/21    Atherosclerosis of abdominal aorta (HCC) 2021    at [facility]    Cancer (HCC)     COPD (chronic obstructive pulmonary disease) (HCC) 2020    on cardiopulmonary stress test Dr. Bellamy    Diastolic dysfunction 2017    going to [place], started on furosemide    Eczema     saw derm 10/13    Elevated liver enzymes 2011    presumed to be due to lipitor    Encounter for eye exam spring 2011, , 10/16, 10/17    [facility], overdue     Esophageal reflux     meds prn    Ex-cigarette smoker since 2011    Heart failure with preserved ejection fraction (HCC) 2017    dx at [place], lasix added    High blood pressure     High cholesterol 07/15/2021    Dr. Bellamy managing    History of  cardiovascular stress test 2020    obstructive pulmonary component on cardiopulmonary stress test at [facility] ordered by Dr. Bellamy     History of Doppler echocardiogram 2021    at [facility], Dr. Bellamy    History of normal resting EKG 7/15/11, 10/6/20    at [facility]    History of nuclear stress test 2017    neg at [place], EF 57%    Hypercalcemia     Iliac artery occlusion, right (HCC) 2020    Dr. Cantu. had lithotripsy and stent, last US 21 at [facility]: aortic atherosclerosis, patent R iliac stent    Left atrial enlargement 11, 12/2/15, 17    Dr. Sonja Roe at [facility], EF 77%    Living will in place 2013    Osteopenia 06, 09, 11, 13    improved , further improvement , d/c med after 5 years, recheck     PFO (patent foramen ovale) ([facility]) 2017    dx at [place], felt not to be clinically signicant. no tx needed    Pulmonary nodule, left 2017    on Ct at [place], also with mildly enlarged nodes    PVD (peripheral vascular disease) ([facility]) 2020    saw Dr. Cantu, stent R iliac on 10/6/20, claudication on R side. , f/u 21    Refused influenza vaccine 19, 10/26/2012, 13, 16, 18    she's considering for     Screening for cardiovascular condition 2010    normal CGXT with EF of 70%, see below for abn stress echo    Sinus bradycardia 2014    with LAE       Past Surgical History:   Procedure Laterality Date    COLONOSCOPY N/A 2015    Procedure: COLONOSCOPY;  Surgeon: Garrett Kim MD;  Location:  ENDOSCOPY          x3 with anesthesia    STENT PLACE ILIAC ART O/P INI  10/06/2020    RAntolin Cantu       Allergies: No Known Allergies    Medications:  Outpatient Medications:    No current facility-administered medications on file prior to encounter.     Current Outpatient Medications on File Prior to Encounter   Medication Sig Dispense Refill    metoprolol succinate 50 MG Oral Tablet 24 Hr Take 1 tablet (50 mg total) by  mouth daily. 90 tablet 3    lisinopril 20 MG Oral Tab Take 1 tablet (20 mg total) by mouth daily. 90 tablet 3    Rosuvastatin Calcium 20 MG Oral Tab       OMEPRAZOLE 20 MG Oral Capsule Delayed Release TAKE 1 CAPSULE(20 MG) BY MOUTH DAILY (Patient taking differently: Take 1 capsule (20 mg total) by mouth as needed.) 90 capsule 3    Calcium Polycarbophil (FIBER) 625 MG Oral Tab Take by mouth.      ASPIRIN 81 MG OR TABS 1 TABLET DAILY      FISH OIL 1000 MG OR CAPS 1 daily      VITAMIN B COMPLEX IJ 1 Q DAY      furosemide 20 MG Oral Tab Take 1 tablet (20 mg total) by mouth once daily. (Patient taking differently: Take 1 tablet (20 mg total) by mouth every other day. Monday, Wednesday, and Friday) 90 tablet 3    Mometasone Furoate 0.1 % External Solution Apply topically to scalp nightly (Patient not taking: Reported on 3/13/2024) 60 mL 2    Clopidogrel Bisulfate 75 MG Oral Tab  (Patient not taking: Reported on 3/13/2024)      Magnesium 400 MG Oral Cap Take 400 mg by mouth daily. (Patient not taking: Reported on 3/12/2024)      Albuterol Sulfate  (90 Base) MCG/ACT Inhalation Aero Soln 2 puffs 15 minutes before exercise. 1 Inhaler 12    ezetimibe 10 MG Oral Tab Take 10 mg by mouth daily. (Patient not taking: Reported on 3/13/2024)      Lovastatin 20 MG Oral Tab Take 20 mg by mouth nightly. (Patient not taking: Reported on 3/12/2024)      Calcium Carbonate-Vitamin D (CALTRATE 600+D OR)       Garlic (GNP GARLIC EXTRACT) 400 MG Oral Tab          Inpatient Medications:  Scheduled Medications:     piperacillin-tazobactam  3.375 g Intravenous Q8H    lidocaine-menthol  1 patch Transdermal Daily     Infusing Medications:     continuous dose heparin 1,000 Units/hr (03/13/24 0730)     PRN Medications:  acetaminophen **OR** HYDROcodone-acetaminophen **OR** HYDROcodone-acetaminophen, morphINE **OR** morphINE **OR** morphINE, polyethylene glycol (PEG 3350), sennosides, bisacodyl, fleet enema, ondansetron, metoclopramide,  ipratropium-albuterol, hydrALAzine    Social History:    History   Smoking Status    Former    Packs/day: 0.30    Years: 40.00    Types: Cigarettes    Quit date: 2011   Smokeless Tobacco    Never       History   Alcohol Use    0.0 - 7.0 standard drinks of alcohol/week     Comment: 1 glass of wine daily       History   Drug Use No     Work:office work.    TB: None  Asbestos: None      Family History   Problem Relation Age of Onset    Heart Disorder Father     Heart Disorder Son         possible heart prob    Other (Other) Son         transverse myelitis, psoriasis    Hypertension Sister     Hypertension Brother     Heart Disorder Brother 76        MI    Hypertension Brother     Hypertension Brother     Hypertension Sister     No Known Problems Daughter     No Known Problems Daughter     No Known Problems Brother     No Known Problems Brother     Breast Cancer Cousin 58        M-cousin ago of dx 58      REVIEW OF SYSTEMS:   A comprehensive 10 point review of systems was completed.  Pertinent positives and negatives noted in the the HPI.    OBJECTIVE:  Temp (24hrs), Av °F (36.7 °C), Min:97.7 °F (36.5 °C), Max:98.5 °F (36.9 °C)   /52   Pulse 85   Temp 97.8 °F (36.6 °C) (Oral)   Resp 22   Ht 154.9 cm (5' 1\")   Wt 136 lb (61.7 kg)   SpO2 97%   BMI 25.70 kg/m²   O2: 6 LNC  General: NAD.   Neuro: Alert, no focal deficits.    HEENT: PERRL  Neck : No LAD  CV: RRR, nl S1, S2, no S4, S3 or murmur.   Lungs: Coarse bilaterally occ wheeze.   Abd: Nontender, non distended.   Ext: No edema.   Skin: No rashes.     Labs:  Recent Labs   Lab 24  0621   WBC 13.9* 9.7   HGB 14.3 12.4   HCT 41.2 35.8   .0 250.0     Recent Labs   Lab 24  0621   * 137*   BUN 11 10   CREATSERUM 1.03* 0.61   CA 9.5 8.8   * 132*   K 4.6 4.0   CL 98 100   CO2 23.0 23.0   AST 41*  --    ALT 19  --    ALB 3.8  --      Recent Labs   Lab 24  0621   INR 0.91  --     PTT 27.7 121.1*     No results for input(s): \"ABGPHT\", \"DSKTVY3C\", \"CYUOA2Q\", \"ABGHCO3\", \"SITE\", \"DEV\", \"THGB\" in the last 168 hours.  COVID-19 Lab Results    COVID-19  No results found for: \"COVID19\"    Pro-Calcitonin  Recent Labs   Lab 246   PCT <0.05       Cardiac  Recent Labs   Lab 24   PBNP 675*       Creatinine Kinase  No results for input(s): \"CK\" in the last 168 hours.    Inflammatory Markers  No results for input(s): \"CRP\", \"BRAULIO\", \"LDH\", \"DDIMER\" in the last 168 hours.      Imaging:  CTA chest 3/12/24 DM. Positive for pulmonary embolism involving segmental arterial branches to the right upper lobe.     2. Multiple mass lesions with interval increase in size and number since 2023, consistent with   progression of pulmonary metastases.     3. Bulky/enlarged mediastinal and hilar lymph nodes, more pronounced than 2023, suggesting   progression of metastatic lymphadenopathy.     4. Area of right upper lobe airspace consolidation, possibly related to superimposed pneumonia.     5. Trace right pleural effusion.             3/2024      2023    Chest images personally reviewed.       Assessment/Plan   Abnormal CT   ? Post obstructive pneumonia with obstruction of upper airway or lymphagietic spread vs pneumonia. Some presence in 2023.   I would continue zosyn for today and repeat PCT tomorrow. If negative tomorrow would d/c abx.   Pulmonary embolism, provoked with metastatic lung cancer as risk factor.   ?RUL on images I see a GABRIEL filling defect. Regardless would recommend continued IV heparin.   Check Venous dopplers and Echo.   If othwersise no changes of RV strain, would convert to DOAC eliquis and plan indefinite treatment.   Lung cancer.   Metastatic adenocarcinoma, PDL 60% dx [address]9633 via supraclav LN bx.   RUL lung masses intiially and brain mets and noted diffuse lung mets. Following with [facility] and Dr. Ashvin DUNAWAY [facility]. On PDL inhibitor with adjunct  trial. First dose 3 days ago .   Suspect COPD  Wean O2  Add breo   Outpt PFT in 3 months.   D/w'd pt and family, questions answered.     My best regards,         Lj Solis MD  [facility] Pulmonary, Critical Care and Sleep Medicine

## 2024-03-13 NOTE — H&P
[facility] Hospitalist H&P       CC:   Chief Complaint   Patient presents with    Difficulty Breathing    Abnormal Result        PCP: Sunny Reyes MD    History of Present Illness: Patient is a 78 year old female with PMH sig for Metastatic lung CA, COPD, AAA, CHF, PFO, PVD, HTN, HLD, GERD, who presents with shortness of breath.  Symptoms started at the end of last week and she was seen by her oncologist who ordered a CT scan which showed acute PE and pneumonia.  Patient was instructed to come to the ER.  Patient is still labored in her breathing and is having pleuritic chest pain.  Has chronic right shoulder pain which is worse at the moment.  Has also been nauseated and vomited today.  Denies any abdominal pain or diarrhea.  Both daughters at bedside.  Patient feels a little more comfortable at this time.    On arrival patient found to be hypoxic and normotensive.  BNP elevated with normal troponin  EKG in normal sinus rhythm    PMH  Past Medical History:   Diagnosis Date    AAA (abdominal aortic aneurysm) (HCC) 08/13/2020    3x3.4 cm, discussed with pt 9/3/20, recheck yearly, quit tob in 2011, treat risk factors.     Abnormal echocardiogram     possible pulm htn, went to [place] for eval 12/19/17 and w/u was negative, note received 8/29/18    Abnormal stress echo 08/15/2016    saw [place] 7/15/21, 12/13/21    Agatston coronary artery calcium score less than 100 11/21/2017    72 at [place], sees Dr. Bellamy, last visit 7/15/21    Atherosclerosis of abdominal aorta (HCC) 08/02/2021    at [facility]    Cancer (HCC)     COPD (chronic obstructive pulmonary disease) (HCC) 12/30/2020    on cardiopulmonary stress test Dr. Bellamy    Diastolic dysfunction 12/18/2017    going to [place], started on furosemide    Eczema 2013    saw derm 10/13    Elevated liver enzymes 07/2011    presumed to be due to lipitor    Encounter for eye exam spring 2011, 9/13, 10/16, 10/17    [facility], overdue 9/20    Esophageal reflux     meds prn    Ex-cigarette  smoker since 2011    Heart failure with preserved ejection fraction (HCC) 2017    dx at [place], lasix added    High blood pressure     High cholesterol 07/15/2021    Dr. Bellamy managing    History of cardiovascular stress test 2020    obstructive pulmonary component on cardiopulmonary stress test at [facility] ordered by Dr. Bellamy     History of Doppler echocardiogram 2021    at [facility], Dr. Bellamy    History of normal resting EKG 7/15/11, 10/6/20    at [facility]    History of nuclear stress test 2017    neg at [place], EF 57%    Hypercalcemia     Iliac artery occlusion, right (HCC) 2020    Dr. Cantu. had lithotripsy and stent, last US 21 at [facility]: aortic atherosclerosis, patent R iliac stent    Left atrial enlargement 11, 12/2/15, 17    Dr. Sonja Roe at [facility], EF 77%    Living will in place 2013    Osteopenia 06, -, 8--, 13    improved , further improvement , d/c med after 5 years, recheck     PFO (patent foramen ovale) ([facility]) 2017    dx at [place], felt not to be clinically signicant. no tx needed    Problems with swallowing     on [facility], had egd in Dec 2015 without cause. not progressive    Pulmonary nodule, left 2017    on Ct at [place], also with mildly enlarged nodes    PVD (peripheral vascular disease) ([facility]) 2020    saw Dr. Cantu, stent R iliac on 10/6/20, claudication on R side. , f/u 21    Refused influenza vaccine 19, 10/26/2012, 13, 16, 18    she's considering for     Screening for cardiovascular condition 2010    normal CGXT with EF of 70%, see below for abn stress echo    Sinus bradycardia 2014    with LAE        PSH  Past Surgical History:   Procedure Laterality Date    COLONOSCOPY N/A 2015    Procedure: COLONOSCOPY;  Surgeon: Garrett Kim MD;  Location:  ENDOSCOPY          x3 with anesthesia    STENT PLACE ILIAC ART O/P INI  10/06/2020    R. Dr. Cantu        ALL:  No Known  Allergies     Home Medications:  No outpatient medications have been marked as taking for the 3/12/24 encounter (Hospital Encounter).         Soc Hx  Social History     Tobacco Use    Smoking status: Former     Packs/day: 0.30     Years: 40.00     Additional pack years: 0.00     Total pack years: 12.00     Types: Cigarettes     Quit date: 2011     Years since quittin.7    Smokeless tobacco: Never   Substance Use Topics    Alcohol use: Yes     Alcohol/week: 0.0 - 7.0 standard drinks of alcohol        Fam Hx  Family History   Problem Relation Age of Onset    Heart Disorder Father     Heart Disorder Son         possible heart prob    Other (Other) Son         transverse myelitis, psoriasis    Hypertension Sister     Hypertension Brother     Heart Disorder Brother 76        MI    Hypertension Brother     Hypertension Brother     Hypertension Sister     No Known Problems Daughter     No Known Problems Daughter     No Known Problems Brother     No Known Problems Brother     Breast Cancer Cousin 58        M-cousin ago of dx 58       Review of Systems  Comprehensive ROS reviewed and negative except for what's stated above.     OBJECTIVE:  BP 92/47   Pulse 67   Temp 97.7 °F (36.5 °C)   Resp 18   Ht 5' 1\" (1.549 m)   Wt 136 lb (61.7 kg)   SpO2 97%   BMI 25.70 kg/m²   General: Alert, no acute distress  HEENT: oral mucosa normal   Neck: non tender, no adenopathy   Lungs: Coarse breath sounds more on the right  Heart: Regular rate and rhythm  Abdomen: soft, non tender, non distended   Extremities: No edema  Skin: no new rash, normal color  Neuro: 5/5 strength in bilateral extremities, normal sensations  Psych: appropriate affect   Diagnostic Data:    CBC/Chem  Recent Labs   Lab 24   WBC 13.9*   HGB 14.3   MCV 90.9   .0   INR 0.91       Recent Labs   Lab 24   *   K 4.6   CL 98   CO2 23.0   BUN 11   CREATSERUM 1.03*   *   CA 9.5       Recent Labs   Lab 24    ALT 19   AST 41*   ALB 3.8       No results for input(s): \"TROP\" in the last 168 hours.    Additional Diagnostics: ECG: NSR    Radiology: No results found.  CT:  IMPRESSION:     1. Positive for pulmonary embolism involving segmental arterial branches to the right upper lobe.     2. Multiple mass lesions with interval increase in size and number since 12/20/2023, consistent with   progression of pulmonary metastases.     3. Bulky/enlarged mediastinal and hilar lymph nodes, more pronounced than 12/20/2023, suggesting   progression of metastatic lymphadenopathy.     4. Area of right upper lobe airspace consolidation, possibly related to superimposed pneumonia.     5. Trace right pleural effusion.     ASSESSMENT / PLAN:   Patient is a 78 year old female with PMH sig for Metastatic lung CA, COPD, AAA, CHF, PFO, PVD, HTN, HLD, GERD, who presents with shortness of breath.  Admitted for acute PE and pneumonia.    Acute hypoxic respiratory failure  History of metastatic lung cancer  History of COPD  Acute PE  PNA  -Presents with shortness of breath  -hypoxic and normotensive.  -BNP elevated with normal troponin  -EKG in normal sinus rhythm  -Echo, lower extremity Doppler  -Heparin drip and Zosyn started  -Pulmonology on consult  -Supplemental oxygen, DuoNebs    AAA  CHF  PFO  PVD  HTN  - Asa, plavix, statin   - Lasix, lisinopril, metop     HLD - ezetimibe, statin    GERD - PPI    FN:  - IVF: none  - Diet: general     DVT Prophy: SCDs heparin drip   Atrophy: Ambulate   Lines: Piv    Dispo: pending clinical course    Outpatient records or previous hospital records reviewed.     Further recommendations pending patient's clinical course.  [facility] hospitalist to continue to follow patient while in house    Patient and/or patient's family given opportunity to ask questions and note understanding and agreeing with therapeutic plan as outlined    Thank You,  Jesse Neumann MD    [place] Hospitalist  Answering Service  number: [phone]

## 2024-03-13 NOTE — PHYSICAL THERAPY NOTE
Reviewed Chart and discuss pt with RN. Pt at this time is off the floor for US of BLE to r/o DVT. Pt was found with PE and is on heparin drip start 3/13 0340. Per rehab policy, PT will wait 24hrs from the start of the heparin drip before working with Pt. PT will re-attempt when appropriate.

## 2024-03-13 NOTE — ED QUICK NOTES
Patient reports right shoulder pain and nausea. MD aware and in patient room to speak with patient and family. New orders obtained for nausea and pain medications

## 2024-03-13 NOTE — PLAN OF CARE
NURSING ADMISSION NOTE      Patient admitted via Cart  Oriented to room.  Safety precautions initiated.  Bed in low position.  Call light in reach.    Assumed patient care at 0130  A&Ox4   Tele: NSR  Dyspnea at rest, on 5L NC  Could not tolerate Vapotherm in ED  Prn hydralazine given   Prn reglan given  Chronic right upper back pain managed with prn acetaminophen  Ambulating via standby  Voiding via bedside commode  Reviewed plan of care with patient and daughter  Safety precautions in place

## 2024-03-13 NOTE — ED PROVIDER NOTES
Patient Seen in: [facility] Emergency Department      History     Chief Complaint   Patient presents with    Difficulty Breathing    Abnormal Result     Stated Complaint: diagnosed with PE tonight/lung CA/pneumonia left lung    Subjective:   HPI    78-year-old female with past medical history of metastatic lung carcinoma presents today for evaluation of an abnormal CT scan.  On Saturday, patient began to feel increasingly short of breath.  She can only walk 15 feet before having difficulty with breathing.  She has not any fevers or chest pain.  She has had a chronic cough.  She was seen by her oncologist yesterday.  She had an outpatient CT of her chest ordered today.  CT demonstrated a right segmental arterial pulmonary embolism and a right upper lobe opacity possibly related to a superimposed pneumonia    Objective:   Past Medical History:   Diagnosis Date    AAA (abdominal aortic aneurysm) (HCC) 08/13/2020    3x3.4 cm, discussed with pt 9/3/20, recheck yearly, quit tob in 2011, treat risk factors.     Abnormal echocardiogram     possible pulm htn, went to [place] for eval 12/19/17 and w/u was negative, note received 8/29/18    Abnormal stress echo 08/15/2016    saw [facility] 7/15/21, 12/13/21    Agatston coronary artery calcium score less than 100 11/21/2017    72 at [place], sees Dr. Bellamy, last visit 7/15/21    Atherosclerosis of abdominal aorta (HCC) 08/02/2021    at [facility]    Cancer (HCC)     COPD (chronic obstructive pulmonary disease) (HCC) 12/30/2020    on cardiopulmonary stress test Dr. Bellamy    Diastolic dysfunction 12/18/2017    going to [place], started on furosemide    Eczema 2013    saw derm 10/13    Elevated liver enzymes 07/2011    presumed to be due to lipitor    Encounter for eye exam spring 2011, 9/13, 10/16, 10/17    [facility], overdue 9/20    Esophageal reflux     meds prn    Ex-cigarette smoker since July 2011    Heart failure with preserved ejection fraction (HCC) 12/07/2017    dx at [place], lasix  added    High blood pressure     High cholesterol 07/15/2021    Dr. Bellamy managing    History of cardiovascular stress test 2020    obstructive pulmonary component on cardiopulmonary stress test at [facility] ordered by Dr. Bellamy     History of Doppler echocardiogram 2021    at [facility], Dr. Bellamy    History of normal resting EKG 7/15/11, 10/6/20    at [facility]    History of nuclear stress test 2017    neg at [place], EF 57%    Hypercalcemia     Iliac artery occlusion, right (HCC) 2020    Dr. Cantu. had lithotripsy and stent, last US 21 at [facility]: aortic atherosclerosis, patent R iliac stent    Left atrial enlargement 11, 12/2/15, 17    Dr. Sonja Roe at [facility], EF 77%    Living will in place 2013    Osteopenia 06, 09, 11, 13    improved , further improvement , d/c med after 5 years, recheck     PFO (patent foramen ovale) ([facility]) 2017    dx at [facility], felt not to be clinically signicant. no tx needed    Problems with swallowing     on occ, had egd in Dec 2015 without cause. not progressive    Pulmonary nodule, left 2017    on Ct at [place], also with mildly enlarged nodes    PVD (peripheral vascular disease) ([facility]) 2020    saw Dr. Cantu, stent R iliac on 10/6/20, claudication on R side. , f/u 21    Refused influenza vaccine 19, 10/26/2012, 13, 16, 18    she's considering for     Screening for cardiovascular condition 2010    normal CGXT with EF of 70%, see below for abn stress echo    Sinus bradycardia 2014    with LAE              Past Surgical History:   Procedure Laterality Date    COLONOSCOPY N/A 2015    Procedure: COLONOSCOPY;  Surgeon: Garrett Kim MD;  Location:  ENDOSCOPY          x3 with anesthesia    STENT PLACE ILIAC ART O/P INI  10/06/2020    RAntolin Cantu                Social History     Socioeconomic History    Marital status:     Number of children: 3   Occupational History     Occupation: retired    Tobacco Use    Smoking status: Former     Packs/day: 0.30     Years: 40.00     Additional pack years: 0.00     Total pack years: 12.00     Types: Cigarettes     Quit date: 2011     Years since quittin.7    Smokeless tobacco: Never   Vaping Use    Vaping Use: Never used   Substance and Sexual Activity    Alcohol use: Yes     Alcohol/week: 0.0 - 7.0 standard drinks of alcohol    Drug use: No    Sexual activity: Not Currently     Partners: Male   Other Topics Concern     Service No    Blood Transfusions No    Caffeine Concern No    Occupational Exposure No    Hobby Hazards No    Sleep Concern Yes     Comment: trouble sleeping, poss due to anxiety    Stress Concern Yes     Comment: occassionally    Weight Concern No    Special Diet No    Back Care No    Exercise Yes     Comment: very active at home, goes to fitness center, walking    Seat Belt Yes    Self-Exams Yes              Review of Systems    Positive for stated complaint: diagnosed with PE tonight/lung CA/pneumonia left lung  Other systems are as noted in HPI.  Constitutional and vital signs reviewed.      All other systems reviewed and negative except as noted above.    Physical Exam     ED Triage Vitals [24]   /80   Pulse 79   Resp 18   Temp 97.7 °F (36.5 °C)   Temp src    SpO2 (!) 87 %   O2 Device None (Room air)       Current:BP (!) 172/74   Pulse 70   Temp 97.7 °F (36.5 °C)   Resp 23   Ht 154.9 cm (5' 1\")   Wt 61.7 kg   SpO2 100%   BMI 25.70 kg/m²         Physical Exam  Vitals and nursing note reviewed.   Constitutional:       Appearance: Normal appearance.   HENT:      Head: Normocephalic.      Nose: Nose normal.      Mouth/Throat:      Mouth: Mucous membranes are moist.   Eyes:      Extraocular Movements: Extraocular movements intact.   Cardiovascular:      Rate and Rhythm: Normal rate and regular rhythm.   Pulmonary:      Effort: Tachypnea present.      Breath sounds: Normal breath  sounds.   Abdominal:      General: Abdomen is flat.   Musculoskeletal:         General: Normal range of motion.      Right lower leg: No edema.      Left lower leg: No edema.   Skin:     General: Skin is warm.   Neurological:      General: No focal deficit present.      Mental Status: She is alert.   Psychiatric:         Mood and Affect: Mood normal.           ED Course     Labs Reviewed   COMP METABOLIC PANEL (14) - Abnormal; Notable for the following components:       Result Value    Glucose 102 (*)     Sodium 130 (*)     Creatinine 1.03 (*)     Calculated Osmolality 270 (*)     eGFR-Cr 55 (*)     AST 41 (*)     All other components within normal limits   PRO BETA NATRIURETIC PEPTIDE - Abnormal; Notable for the following components:    Pro-Beta Natriuretic Peptide 675 (*)     All other components within normal limits   CBC W/ DIFFERENTIAL - Abnormal; Notable for the following components:    WBC 13.9 (*)     Neutrophil Absolute Prelim 10.28 (*)     Neutrophil Absolute 10.28 (*)     Eosinophil Absolute 1.75 (*)     All other components within normal limits   TROPONIN I HIGH SENSITIVITY - Normal   PTT, ACTIVATED - Normal   PROTHROMBIN TIME (PT) - Normal   PROCALCITONIN - Normal    Narrative:     Resulted by: [id number]: K, AST,    LACTIC ACID, PLASMA - Normal   CBC WITH DIFFERENTIAL WITH PLATELET    Narrative:     The following orders were created for panel order CBC With Differential With Platelet.  Procedure                               Abnormality         Status                     ---------                               -----------         ------                     CBC W/ DIFFERENTIAL[[id number]]          Abnormal            Final result                 Please view results for these tests on the individual orders.   SCAN SLIDE   BLOOD CULTURE   BLOOD CULTURE     EKG    Rate, intervals and axes as noted on EKG Report.  Rate: 75  Rhythm: Sinus Rhythm  Reading: no stemi                 IMPRESSION:     1. Positive for  pulmonary embolism involving segmental arterial branches to the right upper lobe.     2. Multiple mass lesions with interval increase in size and number since 12/20/2023, consistent with   progression of pulmonary metastases.     3. Bulky/enlarged mediastinal and hilar lymph nodes, more pronounced than 12/20/2023, suggesting   progression of metastatic lymphadenopathy.     4. Area of right upper lobe airspace consolidation, possibly related to superimposed pneumonia.     5. Trace right pleural effusion.     6. Please see the above discussion for other details.          MDM      Differential Diagnosis  78-year-old female presents today for evaluation of worsening shortness of breath.  She had an outpatient CT scan that showed an acute right pulmonary embolism along with a right upper lobe pneumonia.  On arrival, patient appears to be mildly tachypneic.  She was hypoxic with exertion however this appears to be improving with rest.  She was placed on nasal cannula and her work of breathing became less labored.  Differential  includes pneumonia and pulmonary embolism.  Will obtain labs to assess for signs of cardiac strain or myocardial injury.  Will obtain lactic acid, blood culture assess for signs of sepsis.  Plan for heparin and empiric antibiotics.    EKG    Rate, intervals and axes as noted on EKG Report.  Rate: 59  Rhythm: Sinus Rhythm  Reading: no stemi    11:45 pm  Patient remains hemodynamically stable and satting well on nasal cannula.  Will admit to the hospital for further care.  I notified the hospitalist of need for admission and pulmonology of consultation.    External Chart Reviewed  I reviewed the outpatient CT scan that was performed with the results    Discussions of Management  Pulmonology and the hospitalist notified of need for admission    Admission disposition: 3/12/2024 11:23 PM                                        Medical Decision Making      Disposition and Plan     Clinical Impression:  1.  Acute hypoxemic respiratory failure (HCC)    2. Acute pulmonary embolism without acute cor pulmonale, unspecified pulmonary embolism type (HCC)    3. Pneumonia of right upper lobe due to infectious organism         Disposition:  Admit  3/12/2024 11:23 pm    Follow-up:  No follow-up provider specified.        Medications Prescribed:  Current Discharge Medication List                            Hospital Problems       Present on Admission  Date Reviewed: 9/8/2021            ICD-10-CM Noted POA    * (Principal) Acute hypoxemic respiratory failure (HCC) J96.01 3/12/2024 Unknown

## 2024-03-13 NOTE — ED INITIAL ASSESSMENT (HPI)
Pt wheeled into the ED with c/o of difficulty breathing. Pt was diagnosed with acute Pulmonary embolism and PNA at [facility] today. Pt is here to start medication. Pt has not taken her lasix for the past 2 days.    Hx lung CA, CHF, COPD

## 2024-03-14 LAB
ADENOVIRUS PCR:: NOT DETECTED
ANION GAP SERPL CALC-SCNC: 5 MMOL/L (ref 0–18)
APTT PPP: 83.7 SECONDS (ref 23.3–35.6)
B PARAPERT DNA SPEC QL NAA+PROBE: NOT DETECTED
B PERT DNA SPEC QL NAA+PROBE: NOT DETECTED
BASOPHILS # BLD AUTO: 0.07 X10(3) UL (ref 0–0.2)
BASOPHILS NFR BLD AUTO: 0.7 %
BUN BLD-MCNC: 9 MG/DL (ref 9–23)
C PNEUM DNA SPEC QL NAA+PROBE: NOT DETECTED
CALCIUM BLD-MCNC: 8.7 MG/DL (ref 8.5–10.1)
CHLORIDE SERPL-SCNC: 97 MMOL/L (ref 98–112)
CO2 SERPL-SCNC: 27 MMOL/L (ref 21–32)
CORONAVIRUS 229E PCR:: NOT DETECTED
CORONAVIRUS HKU1 PCR:: NOT DETECTED
CORONAVIRUS NL63 PCR:: NOT DETECTED
CORONAVIRUS OC43 PCR:: NOT DETECTED
CREAT BLD-MCNC: 0.81 MG/DL
EGFRCR SERPLBLD CKD-EPI 2021: 73 ML/MIN/1.73M2 (ref 60–?)
EOSINOPHIL # BLD AUTO: 1.83 X10(3) UL (ref 0–0.7)
EOSINOPHIL NFR BLD AUTO: 17 %
ERYTHROCYTE [DISTWIDTH] IN BLOOD BY AUTOMATED COUNT: 13 %
FLUAV RNA SPEC QL NAA+PROBE: NOT DETECTED
FLUBV RNA SPEC QL NAA+PROBE: NOT DETECTED
GLUCOSE BLD-MCNC: 129 MG/DL (ref 70–99)
HCT VFR BLD AUTO: 36.3 %
HGB BLD-MCNC: 12.4 G/DL
IMM GRANULOCYTES # BLD AUTO: 0.03 X10(3) UL (ref 0–1)
IMM GRANULOCYTES NFR BLD: 0.3 %
LYMPHOCYTES # BLD AUTO: 1.1 X10(3) UL (ref 1–4)
LYMPHOCYTES NFR BLD AUTO: 10.2 %
MAGNESIUM SERPL-MCNC: 1.7 MG/DL (ref 1.6–2.6)
MCH RBC QN AUTO: 31.7 PG (ref 26–34)
MCHC RBC AUTO-ENTMCNC: 34.2 G/DL (ref 31–37)
MCV RBC AUTO: 92.8 FL
METAPNEUMOVIRUS PCR:: NOT DETECTED
MONOCYTES # BLD AUTO: 0.6 X10(3) UL (ref 0.1–1)
MONOCYTES NFR BLD AUTO: 5.6 %
MYCOPLASMA PNEUMONIA PCR:: NOT DETECTED
NEUTROPHILS # BLD AUTO: 7.13 X10 (3) UL (ref 1.5–7.7)
NEUTROPHILS # BLD AUTO: 7.13 X10(3) UL (ref 1.5–7.7)
NEUTROPHILS NFR BLD AUTO: 66.2 %
OSMOLALITY SERPL CALC.SUM OF ELEC: 268 MOSM/KG (ref 275–295)
PARAINFLUENZA 1 PCR:: NOT DETECTED
PARAINFLUENZA 2 PCR:: NOT DETECTED
PARAINFLUENZA 3 PCR:: NOT DETECTED
PARAINFLUENZA 4 PCR:: NOT DETECTED
PLATELET # BLD AUTO: 247 10(3)UL (ref 150–450)
POTASSIUM SERPL-SCNC: 3.4 MMOL/L (ref 3.5–5.1)
PROCALCITONIN SERPL-MCNC: <0.05 NG/ML (ref ?–0.16)
RBC # BLD AUTO: 3.91 X10(6)UL
RHINOVIRUS/ENTERO PCR:: NOT DETECTED
RSV RNA SPEC QL NAA+PROBE: NOT DETECTED
SARS-COV-2 RNA NPH QL NAA+NON-PROBE: NOT DETECTED
SODIUM SERPL-SCNC: 129 MMOL/L (ref 136–145)
WBC # BLD AUTO: 10.8 X10(3) UL (ref 4–11)

## 2024-03-14 PROCEDURE — 84145 PROCALCITONIN (PCT): CPT | Performed by: INTERNAL MEDICINE

## 2024-03-14 PROCEDURE — 87449 NOS EACH ORGANISM AG IA: CPT | Performed by: INTERNAL MEDICINE

## 2024-03-14 PROCEDURE — 85025 COMPLETE CBC W/AUTO DIFF WBC: CPT | Performed by: INTERNAL MEDICINE

## 2024-03-14 PROCEDURE — 97530 THERAPEUTIC ACTIVITIES: CPT

## 2024-03-14 PROCEDURE — 83735 ASSAY OF MAGNESIUM: CPT | Performed by: INTERNAL MEDICINE

## 2024-03-14 PROCEDURE — 0202U NFCT DS 22 TRGT SARS-COV-2: CPT | Performed by: INTERNAL MEDICINE

## 2024-03-14 PROCEDURE — 97165 OT EVAL LOW COMPLEX 30 MIN: CPT

## 2024-03-14 PROCEDURE — 80048 BASIC METABOLIC PNL TOTAL CA: CPT | Performed by: INTERNAL MEDICINE

## 2024-03-14 PROCEDURE — 85730 THROMBOPLASTIN TIME PARTIAL: CPT | Performed by: HOSPITALIST

## 2024-03-14 PROCEDURE — 94799 UNLISTED PULMONARY SVC/PX: CPT

## 2024-03-14 PROCEDURE — 97161 PT EVAL LOW COMPLEX 20 MIN: CPT

## 2024-03-14 RX ORDER — POTASSIUM CHLORIDE 20 MEQ/1
40 TABLET, EXTENDED RELEASE ORAL ONCE
Status: COMPLETED | OUTPATIENT
Start: 2024-03-14 | End: 2024-03-14

## 2024-03-14 RX ORDER — MAGNESIUM OXIDE 400 MG/1
400 TABLET ORAL ONCE
Status: COMPLETED | OUTPATIENT
Start: 2024-03-14 | End: 2024-03-14

## 2024-03-14 NOTE — PLAN OF CARE
Assumed pt care at 1930  A&Ox4, able to make needs known  On 2L/min O2 via nc w/ dyspnea on exertion  Complained of R shoulder pain, one time dose of lidocaine patch given and prn tylenol  PRN melatonin given for sleep  Daughter at bedside  Zosyn administered per order  BP elevated in AM, PRN hydralazine given  Call light in reach  Bed in low position

## 2024-03-14 NOTE — PHYSICAL THERAPY NOTE
PHYSICAL THERAPY EVALUATION - INPATIENT     Room Number: 7611/7611-A  Evaluation Date: 3/14/2024  Type of Evaluation: Initial       Presenting Problem: Acute Hypoxemic Respiratory failure  Co-Morbidities : lung CA, COPD, AAA, CHF, PFO, PVD, HTN, HLD, GERD  Reason for Therapy: Mobility Dysfunction and Discharge Planning    PHYSICAL THERAPY ASSESSMENT   Patient is currently functioning near baseline with bed mobility, transfers, and gait.  Prior to admission, patient's baseline is IND with all ADLs and mobility.  Patient is requiring supervision as a result of the following impairments: decreased endurance/aerobic capacity and increased O2 needs from baseline.  Physical Therapy will continue to follow for duration of hospitalization.    Patient will benefit from continued skilled PT Services For duration of hospitalization, however, given the patient is functioning near baseline level do not anticipate skilled therapy needs at discharge .    PLAN  PT Treatment Plan: Bed mobility;Body mechanics;Endurance;Gait training;Strengthening;Stair training;Transfer training;Balance training  Rehab Potential : Good  Frequency (Obs): 3-5x/week  Number of Visits to Meet Established Goals: 3      CURRENT GOALS    Goal #1 Patient is able to ambulate 200 feet with assist device: walker - rolling at assistance level: modified independent while reporting a Dyspnea scale of 3/10     Goal #2    Goal #3      Goal #4    Goal #5    Goal #6    Goal Comments: Goals established on 3/14/2024      PHYSICAL THERAPY MEDICAL/SOCIAL HISTORY  History related to current admission: Patient is a 78 year old female admitted on 3/12/2024 from Home for SOB.  Pt diagnosed with Acute pulmonary embolism and acute hypoxemic respiratory failure.      HOME SITUATION  Type of Home: House   Home Layout: One level  Stairs to Enter : 2             Lives With: Alone (Family near by)  Drives: Yes     Patient Regularly Uses: Reading glasses    Prior Level of  [place]: Pt states that she lives alone in a home. Pt reports that she was IND with all ADLs and mobility. Pt states that has no history of falls. Pt reports that she is very active in community.     SUBJECTIVE  \"I am ok right now\"      OBJECTIVE  Precautions: Bed/chair alarm  Fall Risk: Standard fall risk    WEIGHT BEARING RESTRICTION                   PAIN ASSESSMENT  Ratin  Location: Pt reports no pain       COGNITION  Overall Cognitive Status:  WFL - within functional limits    RANGE OF MOTION AND STRENGTH ASSESSMENT  Upper extremity ROM and strength are within functional limits     Lower extremity ROM is within functional limits     Lower extremity strength is within functional limits       BALANCE  Static Sitting: Good  Dynamic Sitting: Good  Static Standing: Fair  Dynamic Standing: Fair    ADDITIONAL TESTS                                    ACTIVITY TOLERANCE                         O2 WALK       NEUROLOGICAL FINDINGS                        AM-PAC '6-Clicks' INPATIENT SHORT FORM - BASIC MOBILITY  How much difficulty does the patient currently have...  Patient Difficulty: Turning over in bed (including adjusting bedclothes, sheets and blankets)?: A Little   Patient Difficulty: Sitting down on and standing up from a chair with arms (e.g., wheelchair, bedside commode, etc.): A Little   Patient Difficulty: Moving from lying on back to sitting on the side of the bed?: A Little   How much help from another person does the patient currently need...   Help from Another: Moving to and from a bed to a chair (including a wheelchair)?: A Little   Help from Another: Need to walk in hospital room?: A Little   Help from Another: Climbing 3-5 steps with a railing?: A Little       AM-PAC Score:  Raw Score: 18   Approx Degree of Impairment: 46.58%   Standardized Score (AM-PAC Scale): 43.63   CMS Modifier (G-Code): CK    FUNCTIONAL ABILITY STATUS  Gait Assessment   Functional Mobility/Gait Assessment  Gait Assistance:  Supervision  Distance (ft): 200  Assistive Device: None  Pattern:  (Decrease joe)    Skilled Therapy Provided     Bed Mobility:  Rolling: NT  Supine to sit: NT   Sit to supine: NT     Transfer Mobility:  Sit to stand: Sup   Stand to sit: Sup  Gait = Sup 200ft using no assisted device. Pt required multiple rest breaks.     Therapist's Comments: At rest pt reported a dyspnea scale of 1/10, while ambulating pt reported a 8/10 on the dyspnea scale. Pt's initial O2 was 2L but required 3L to improve SpO2% while ambulating and at rest as pt's SpO2 decrease to mid 80s. Pt was educated on pursed lip breathing. Pt was educated on the benefits on the benefits of energy conservation techniques.     Exercise/Education Provided:  Gait training  Transfer training    Patient End of Session: Up in chair;Needs met;Call light within reach;RN aware of session/findings;All patient questions and concerns addressed;Family present      Patient Evaluation Complexity Level:  History Moderate - 1 or 2 personal factors and/or co-morbidities   Examination of body systems Low - addressing 1-2 elements   Clinical Presentation Low - Stable   Clinical Decision Making Low - Stable       PT Session Time: 23 minutes  Therapeutic Activity: 15 minutes

## 2024-03-14 NOTE — PROGRESS NOTES
[facility]  Hospitalist Progress Note                                                                     [facility]   [facility] [place]  12/21/1945    SUBJECTIVE:  Patient seen and examined.  Down to 2L O2.   Feeling slightly better than yesterday.  Dyspnea improving.  Daughter bedside.  NAD.       OBJECTIVE:  Temp:  [97.4 °F (36.3 °C)-97.8 °F (36.6 °C)] 97.8 °F (36.6 °C)  Pulse:  [78-95] 83  Resp:  [18-20] 18  BP: (133-162)/(55-76) 146/60  SpO2:  [91 %-97 %] 96 %  Exam  Gen: No acute distress, alert and oriented x3, no focal neurologic deficits  Pulm: Lungs clear bilaterally, normal respiratory effort  CV: Heart with regular rate and rhythm, no murmur.  Normal PMI.    Abd: Abdomen soft, nontender, nondistended, no organomegaly, bowel sounds present  MSK: Full range of motion in extremities, no clubbing, no cyanosis  Skin: no rashes or lesions    Labs:   Recent Labs   Lab 03/12/24 2156 03/13/24 0621 03/14/24  0550   WBC 13.9* 9.7 10.8   HGB 14.3 12.4 12.4   MCV 90.9 91.1 92.8   .0 250.0 247.0   INR 0.91  --   --        Recent Labs   Lab 03/12/24 2156 03/13/24 0621 03/14/24  0550   * 132* 129*   K 4.6 4.0 3.4*   CL 98 100 97*   CO2 23.0 23.0 27.0   BUN 11 10 9   CREATSERUM 1.03* 0.61 0.81   CA 9.5 8.8 8.7   MG  --  1.8 1.7   * 137* 129*       Recent Labs   Lab 03/12/24 2156   ALT 19   AST 41*   ALB 3.8       No results for input(s): \"PGLU\" in the last 168 hours.    Meds:   Scheduled:    piperacillin-tazobactam  3.375 g Intravenous Q8H    fluticasone furoate-vilanterol  1 puff Inhalation Daily    aspirin  81 mg Oral Daily    furosemide  20 mg Oral QOD    metoprolol succinate ER  50 mg Oral Daily    rosuvastatin  20 mg Oral Nightly    lidocaine-menthol  1 patch Transdermal Daily     Continuous Infusions:    continuous dose heparin 1,000 Units/hr (03/13/24 2147)     PRN: acetaminophen **OR**  HYDROcodone-acetaminophen **OR** HYDROcodone-acetaminophen, morphINE **OR** morphINE **OR** morphINE, polyethylene glycol (PEG 3350), sennosides, bisacodyl, fleet enema, ondansetron, metoclopramide, ipratropium-albuterol, hydrALAzine, albuterol, melatonin    Assessment/Plan:  Principal Problem:    Acute hypoxemic respiratory failure (HCC)  Active Problems:    Acute pulmonary embolism without acute cor pulmonale, unspecified pulmonary embolism type (HCC)    Pneumonia of right upper lobe due to infectious organism    78 year old female with PMH sig for Metastatic lung CA, COPD, AAA, CHF, PFO, PVD, HTN, HLD, GERD, who presents with shortness of breath.  Admitted for acute PE and pneumonia.     Acute hypoxic respiratory failure  History of metastatic lung cancer  History of COPD  Acute RUL segmental PE  PNA (low suspicion)   -Presents with shortness of breath  -hypoxic and normotensive.  -BNP elevated with normal troponin  -EKG in normal sinus rhythm  -Echo, lower extremity Doppler - results pending  -Heparin drip, cont before transitioning to DOAC closer to dc   - low suspicion for PNA (clinical history not supportive of PNA and procal negative). Low threshold to dc Zosyn, d/w pulm   -Pulmonology on consult  -Supplemental oxygen, wean as able  - DuoNebs     AAA  CHF  PFO  PVD  HTN  - Asa, plavix, statin   - Lasix, lisinopril, metop      HLD - ezetimibe, statin    GERD - PPI     FEN: Regular diet, PT/OT   DVT Prophy: heparin drip   Full code     Dispo - JAMEL 1-2d, hopefully off o2 tomorrow can dc then     Jaz Mcdonald MD  [facility] Hospitalist  Message over [employer]  Pager: [phone]

## 2024-03-14 NOTE — OCCUPATIONAL THERAPY NOTE
OCCUPATIONAL THERAPY EVALUATION - INPATIENT     Room Number: 7611/7611-A  Evaluation Date: 3/14/2024  Type of Evaluation: Initial  Presenting Problem: Acute resp failure    Physician Order: IP Consult to Occupational Therapy  Reason for Therapy: ADL/IADL Dysfunction and Discharge Planning    OCCUPATIONAL THERAPY ASSESSMENT   Patient is currently functioning below baseline with toileting, bathing, upper body dressing, lower body dressing, bed mobility, transfers, stating sitting balance, dynamic sitting balance, static standing balance, dynamic standing balance, maintaining seated position, functional standing tolerance, and energy conservation strategies. Prior to admission, patient's baseline is ind .  Patient is requiring contact guard assist as a result of the following impairments: decreased functional strength and decreased endurance. Occupational Therapy will continue to follow for duration of hospitalization.    Patient will benefit from continued skilled OT Services at discharge to promote functional independence in home.  Anticipate patient will return home with home health OT      History Related to Current Admission: Patient is a 78 year old female admitted on 3/12/2024 with Presenting Problem: Acute resp failure. Co-Morbidities : lung CA, COPD, AAA, CHF, PFO, PVD, HTN, HLD, GERD    WEIGHT BEARING RESTRICTION                   Recommendations for nursing staff:   Transfers: CGA with RW  Toileting location: Toilet    EVALUATION SESSION:  Patient Start of Session: Supine  FUNCTIONAL TRANSFER ASSESSMENT  Sit to Stand: Edge of Bed  Edge of Bed: Contact Guard Assist  Toilet Transfer: Contact Guard Assist    BED MOBILITY  Rolling: Contact Guard Assist  Supine to Sit : Contact Guard Assist    BALANCE ASSESSMENT  Static Sitting: Supervision  Sitting Bilateral: Stand-by Assist  Static Standing: Contact Guard Assist  Standing Bilateral: Contact Guard Assist    FUNCTIONAL ADL ASSESSMENT  Grooming Seated: Contact  Guard Assist  LB Dressing Seated: Contact Guard Assist  LB Dressing Standing: Contact Guard Assist  Toileting Seated: Contact Guard Assist      ACTIVITY TOLERANCE: Ed regarding pacing and EC. Pt with increased WOB with activity.  Pulse:  (Asymptomatic vitals throughout session)                      O2 SATURATIONS       COGNITION  Overall Cognitive Status:  WFL - within functional limits    Upper Extremity   ROM: within functional limits  WFL  Strength: within functional limits  WFL  Coordination  Gross motor: WFL  Fine motor: WFL  Sensation: Light touch:  intact    EDUCATION PROVIDED  Patient: Role of Occupational Therapy; Plan of Care; Adaptive Equipment Recommendations; Discharge Recommendations; DME Recommendations; Fall Prevention; Functional Transfer Techniques; Compensatory ADL Techniques; Energy Conservation; Proper Body Mechanics  Patient's Response to Education: Verbalized Understanding; Returned Demonstration    Equipment used: ed regarding benefits of RW for EC  Demonstrates functional use, Would benefit from additional trial      Therapist comments: Pt presents supine in bed. Pt ed regarding role of OT and POC throughout stay. OT facilitated compensatory ADL completion strategies. Pt ed regarding safe functional transfers with proper body mechanics. Pt dtr present during session. Pt ed regarding pacing and EC, increased WOB 8/10 with activity. Pt ed regarding taking frequent breaks and benefits of RW for EC. Pt ed regarding seated ADL completion.       Patient End of Session: Up in chair;Needs met;Call light within reach;RN aware of session/findings;All patient questions and concerns addressed;Alarm set;Family present    OCCUPATIONAL PROFILE    HOME SITUATION  Type of Home: House  Home Layout: One level  Lives With: Alone (Family near by)    Toilet and Equipment: Comfort height toilet  Shower/Tub and Equipment: Walk-in shower  Other Equipment:  (WC, Cane in storage)          Drives: Yes  Patient  Regularly Uses: Reading glasses    Prior Level of Function: Ind with ADLs    SUBJECTIVE   \"I need to take a break\"    PAIN ASSESSMENT  Ratin          OBJECTIVE  Precautions: Bed/chair alarm  Fall Risk: Standard fall risk      ASSESSMENTS    AM-PAC ‘6-Clicks’ Inpatient Daily Activity Short Form  -   Putting on and taking off regular lower body clothing?: A Little  -   Bathing (including washing, rinsing, drying)?: A Little  -   Toileting, which includes using toilet, bedpan or urinal? : A Little  -   Putting on and taking off regular upper body clothing?: None  -   Taking care of personal grooming such as brushing teeth?: None  -   Eating meals?: None    AM-PAC Score:  Score: 21  Approx Degree of Impairment: 32.79%  Standardized Score (AM-PAC Scale): 44.27    ADDITIONAL TESTS     NEUROLOGICAL FINDINGS      COGNITION ASSESSMENTS       PLAN  OT Treatment Plan: Balance activities;IADL training;Energy conservation/work simplification techniques;ADL training;Functional transfer training;UE strengthening/ROM;Patient/Family education;Patient/Family training;Equipment eval/education  Rehab Potential : Good  Frequency: 3x/week  Number of Visits to Meet Established Goals: 3    ADL Goals   Patient will perform all ADLs: with supervision  Patient will perform grooming: with supervision  Patient will perform upper body dressing:  with supervision  Patient will perform lower body dressing:  with supervision  Patient will perform toileting: independently    Functional Transfer Goals  Patient will perform all functional transfers:  with supervision    Additional Goals  Pt will identify and utilize 3 EC strategies    Therapist Goals    Patient Evaluation Complexity Level:   Occupational Profile/Medical History LOW - Brief history including review of medical or therapy records    Specific performance deficits impacting engagement in ADL/IADL LOW  1 - 3 performance deficits    Client Assessment/Performance Deficits LOW - No  comorbidities nor modifications of tasks    Clinical Decision Making LOW - Analysis of occupational profile, problem-focused assessments, limited treatment options    Overall Complexity LOW     OT Session Time: 33 minutes  Self-Care Home Management: 6 minutes  Therapeutic Activity: 16 minutes  Neuromuscular Re-education: 0 minutes  Therapeutic Exercise: 0 minutes  Cognitive Skills: 0 minutes  Sensory Integrative: 0 minutes  Orthotic Management and Trainin minutes  Can add/delete any of these

## 2024-03-14 NOTE — PLAN OF CARE
Assumed care at 0700. Pt A/O x4. 2L O2 per NC. NSR on tele.   VSS. Heparin gtt infusing at 10 ml/hr. Next PTT yudi AM.   Full respiratory panel completed. Need urine sample for cx.   Pt family at the bedside. Updated on POC.

## 2024-03-14 NOTE — PROGRESS NOTES
[facility]    Mojgan Jefferson Patient Status:  Inpatient    1945 MRN [id number]   Location [facility] 7NE-A Attending Latoya Mcdonald*   Hosp Day # 1 PCP Sunny Reyes MD     SUBJECTIVE: Pt complains of some nausea.  No cough or chest pain.     OBJECTIVE:  /60 (BP Location: Left arm)   Pulse 83   Temp 97.8 °F (36.6 °C) (Oral)   Resp 18   Ht 5' 1\" (1.549 m)   Wt 133 lb 12.8 oz (60.7 kg)   SpO2 96%   BMI 25.28 kg/m²   O2 requirement:2L     I/O last 3 completed shifts:  In: 200 [IV PIGGYBACK:200]  Out: 1 [Urine:1]  No intake/output data recorded.     Current Medications:   Current Facility-Administered Medications:     potassium chloride (K-Dur) tab 40 mEq, 40 mEq, Oral, Once    magnesium oxide (Mag-Ox) tab 400 mg, 400 mg, Oral, Once    piperacillin-tazobactam (Zosyn) 3.375 g in dextrose 5% 100 mL IVPB-ADDV, 3.375 g, Intravenous, Q8H    acetaminophen (Tylenol) tab 650 mg, 650 mg, Oral, Q4H PRN **OR** HYDROcodone-acetaminophen (Norco) 5-325 MG per tab 1 tablet, 1 tablet, Oral, Q4H PRN **OR** HYDROcodone-acetaminophen (Norco) 5-325 MG per tab 2 tablet, 2 tablet, Oral, Q4H PRN    morphINE PF 2 MG/ML injection 1 mg, 1 mg, Intravenous, Q2H PRN **OR** morphINE PF 2 MG/ML injection 2 mg, 2 mg, Intravenous, Q2H PRN **OR** morphINE PF 4 MG/ML injection 4 mg, 4 mg, Intravenous, Q2H PRN    polyethylene glycol (PEG 3350) (Miralax) 17 g oral packet 17 g, 17 g, Oral, Daily PRN    sennosides (Senokot) tab 17.2 mg, 17.2 mg, Oral, Nightly PRN    bisacodyl (Dulcolax) 10 MG rectal suppository 10 mg, 10 mg, Rectal, Daily PRN    fleet enema (Fleet) 7-19 GM/118ML rectal enema 133 mL, 1 enema, Rectal, Once PRN    ondansetron (Zofran) 4 MG/2ML injection 4 mg, 4 mg, Intravenous, Q6H PRN    metoclopramide (Reglan) 5 mg/mL injection 5 mg, 5 mg, Intravenous, Q8H PRN    ipratropium-albuterol (Duoneb) 0.5-2.5 (3) MG/3ML inhalation solution 3 mL, 3 mL, Nebulization, Q6H PRN    hydrALAzine (Apresoline) 20  mg/mL injection 10 mg, 10 mg, Intravenous, Q6H PRN    fluticasone furoate-vilanterol (Breo Ellipta) 100-25 MCG/ACT inhaler 1 puff, 1 puff, Inhalation, Daily    albuterol (Ventolin HFA) 108 (90 Base) MCG/ACT inhaler 1 puff, 1 puff, Inhalation, Q6H PRN    aspirin DR tab 81 mg, 81 mg, Oral, Daily    furosemide (Lasix) tab 20 mg, 20 mg, Oral, QOD    metoprolol succinate ER (Toprol XL) 24 hr tab 50 mg, 50 mg, Oral, Daily    rosuvastatin (Crestor) tab 20 mg, 20 mg, Oral, Nightly    melatonin tab 3 mg, 3 mg, Oral, Nightly PRN    lidocaine-menthol 4-1 % patch 1 patch, 1 patch, Transdermal, Once    heparin (Porcine) 26719 units/250mL infusion PE/DVT/THROMBUS CONTINUOUS, 200-3,000 Units/hr, Intravenous, Continuous    lidocaine-menthol 4-1 % patch 1 patch, 1 patch, Transdermal, Daily      Physical Exam:                          General: alert, cooperative, in NAD                          HEENT: oropharynx clear without erythema or exudates, moist mucous membranes                          Lungs: Clear to auscultation bilaterally, no wheezes or crackles                           Chest wall: No tenderness or deformity.                          Heart: Regular rate and rhythm, normal S1S2                          Abdomen: soft, non-tender, non-distended, positive BS.                          Extremity: No clubbing or cyanosis. no edema                          Skin: No rashes or lesions.       Lab Results   Component Value Date    WBC 10.8 03/14/2024    RBC 3.91 03/14/2024    HGB 12.4 03/14/2024    HCT 36.3 03/14/2024    MCV 92.8 03/14/2024    MCH 31.7 03/14/2024    MCHC 34.2 03/14/2024    RDW 13.0 03/14/2024    .0 03/14/2024     Lab Results   Component Value Date     03/14/2024    K 3.4 03/14/2024    CL 97 03/14/2024    CO2 27.0 03/14/2024    BUN 9 03/14/2024    CREATSERUM 0.81 03/14/2024     03/14/2024    CA 8.7 03/14/2024     Lab Results   Component Value Date    PT 12.9 07/18/2011    INR 0.91 03/12/2024     INR 0.96 07/18/2011          Imaging: I have independently visualized all relevant chest imaging in PACS.  I agree with the radiology interpretation except where noted.               3/2024                                                                   12/2023    Chest images personally reviewed.       ASSESSMENT/PLAN:  Abnormal CT   -? Post obstructive pneumonia with obstruction of upper airway or lymphagietic spread vs pneumonia. Some presence in 12/2023.   -PCT negative, WBC count normal and afebrile   -check urine strep/legionella   -on empiric zosyn, if cultures remain negative would stop  Pulmonary embolism, provoked with metastatic lung cancer as risk factor.   -?RUL on images I see a GABRIEL filling defect. Regardless would recommend continued IV heparin.   -Venous dopplers negative for DVT  -Echo without evidence of RV strain   -would convert to DOAC eliquis and plan indefinite treatment.   Lung cancer.   -Metastatic adenocarcinoma, PDL 60% dx 59831 via supraclav LN bx.   -RUL lung masses intiially and brain mets and noted diffuse lung mets. Following with [facility] and Dr. Ashvin DUNAWAY of [facility]. On PDL inhibitor with adjunct trial. First dose 3 days ago .   Suspect COPD  -Wean O2  -Added breo   -Outpt PFT in 3 months.   Dispo:  -will follow     Emili Macias MD  3/14/2024  9:25 AM

## 2024-03-15 VITALS
TEMPERATURE: 98 F | OXYGEN SATURATION: 99 % | HEIGHT: 61 IN | BODY MASS INDEX: 25.61 KG/M2 | SYSTOLIC BLOOD PRESSURE: 120 MMHG | DIASTOLIC BLOOD PRESSURE: 52 MMHG | RESPIRATION RATE: 20 BRPM | HEART RATE: 85 BPM | WEIGHT: 135.63 LBS

## 2024-03-15 LAB
ANION GAP SERPL CALC-SCNC: 5 MMOL/L (ref 0–18)
APTT PPP: 81.4 SECONDS (ref 23.3–35.6)
BASOPHILS # BLD AUTO: 0.07 X10(3) UL (ref 0–0.2)
BASOPHILS NFR BLD AUTO: 0.8 %
BUN BLD-MCNC: 9 MG/DL (ref 9–23)
CALCIUM BLD-MCNC: 9.2 MG/DL (ref 8.5–10.1)
CHLORIDE SERPL-SCNC: 99 MMOL/L (ref 98–112)
CO2 SERPL-SCNC: 26 MMOL/L (ref 21–32)
CREAT BLD-MCNC: 0.82 MG/DL
EGFRCR SERPLBLD CKD-EPI 2021: 72 ML/MIN/1.73M2 (ref 60–?)
EOSINOPHIL # BLD AUTO: 0.57 X10(3) UL (ref 0–0.7)
EOSINOPHIL NFR BLD AUTO: 6.3 %
ERYTHROCYTE [DISTWIDTH] IN BLOOD BY AUTOMATED COUNT: 12.9 %
GLUCOSE BLD-MCNC: 119 MG/DL (ref 70–99)
HCT VFR BLD AUTO: 35.1 %
HGB BLD-MCNC: 12.3 G/DL
IMM GRANULOCYTES # BLD AUTO: 0.03 X10(3) UL (ref 0–1)
IMM GRANULOCYTES NFR BLD: 0.3 %
L PNEUMO AG UR QL: NEGATIVE
LYMPHOCYTES # BLD AUTO: 1.14 X10(3) UL (ref 1–4)
LYMPHOCYTES NFR BLD AUTO: 12.6 %
MAGNESIUM SERPL-MCNC: 1.8 MG/DL (ref 1.6–2.6)
MCH RBC QN AUTO: 31.9 PG (ref 26–34)
MCHC RBC AUTO-ENTMCNC: 35 G/DL (ref 31–37)
MCV RBC AUTO: 91.2 FL
MONOCYTES # BLD AUTO: 0.64 X10(3) UL (ref 0.1–1)
MONOCYTES NFR BLD AUTO: 7.1 %
NEUTROPHILS # BLD AUTO: 6.62 X10 (3) UL (ref 1.5–7.7)
NEUTROPHILS # BLD AUTO: 6.62 X10(3) UL (ref 1.5–7.7)
NEUTROPHILS NFR BLD AUTO: 72.9 %
OSMOLALITY SERPL CALC.SUM OF ELEC: 270 MOSM/KG (ref 275–295)
PLATELET # BLD AUTO: 229 10(3)UL (ref 150–450)
POTASSIUM SERPL-SCNC: 3.8 MMOL/L (ref 3.5–5.1)
POTASSIUM SERPL-SCNC: 3.8 MMOL/L (ref 3.5–5.1)
RBC # BLD AUTO: 3.85 X10(6)UL
SODIUM SERPL-SCNC: 130 MMOL/L (ref 136–145)
STREP PNEUMO ANTIGEN, URINE: NEGATIVE
WBC # BLD AUTO: 9.1 X10(3) UL (ref 4–11)

## 2024-03-15 PROCEDURE — 85025 COMPLETE CBC W/AUTO DIFF WBC: CPT | Performed by: INTERNAL MEDICINE

## 2024-03-15 PROCEDURE — 85730 THROMBOPLASTIN TIME PARTIAL: CPT | Performed by: HOSPITALIST

## 2024-03-15 PROCEDURE — 83735 ASSAY OF MAGNESIUM: CPT | Performed by: INTERNAL MEDICINE

## 2024-03-15 PROCEDURE — 84132 ASSAY OF SERUM POTASSIUM: CPT | Performed by: HOSPITALIST

## 2024-03-15 PROCEDURE — 80048 BASIC METABOLIC PNL TOTAL CA: CPT | Performed by: INTERNAL MEDICINE

## 2024-03-15 RX ORDER — MAGNESIUM OXIDE 400 MG/1
400 TABLET ORAL ONCE
Status: COMPLETED | OUTPATIENT
Start: 2024-03-15 | End: 2024-03-15

## 2024-03-15 NOTE — PROGRESS NOTES
03/15/24 1200   Mobility   O2 walk? Yes   SPO2% on Room Air at Rest 94   SPO2% on Oxygen at Rest 0   At rest oxygen flow (liters per minute) 1   SPO2% Ambulation on Room Air 79   SPO2% Ambulation on Oxygen 94   Ambulation oxygen flow (liters per minute) 2

## 2024-03-15 NOTE — HOME CARE LIAISON
[facility]. Spoke w/ patient who is agreeable with [facility]. Contact information placed on AVS.

## 2024-03-15 NOTE — DISCHARGE SUMMARY
[facility] Hospitalist Discharge Summary     Patient ID:  Mojgan Jefferson  78 year old  12/21/1945    Admit date: 3/12/2024    Discharge date and time: 03/15/24     Attending Physician: Latoya Mcdonald*     Primary Care Physician: Sunny Reyes MD     Discharge Diagnoses: Pneumonia of right upper lobe due to infectious organism [J18.9]  Acute hypoxemic respiratory failure (HCC) [J96.01]  Acute pulmonary embolism without acute cor pulmonale, unspecified pulmonary embolism type (HCC) [I26.99]    Please note that only IHP DMG and EMG patients enrolled in the Medicare ACO, BCBS ACO and BCBS HMOs will be handled by the [facility] Care Management team.  For all other patients, please follow usual protocol for discharge care transition.    Discharge Condition: stable    Disposition:  home    Important Follow up:       Follow-up Information       Guerrero Naik MD. Go in 5 day(s).    Specialty: Internal Medicine  Why: scheduled 3/20 at 430pm  Contact information:  [facility] [id number] [phone]                                         Hospital Course:        Acute hypoxic respiratory failure  History of metastatic lung cancer  History of COPD  Acute RUL segmental PE  PNA (low suspicion)   -Presents with shortness of breath  -hypoxic and normotensive.  -BNP elevated with normal troponin  -EKG in normal sinus rhythm  -Echo, lower extremity Doppler - results pending  -Heparin drip, cont before transitioning to DOAC closer to dc - transition to Eliquis VTE dosing and dc with starter mitzy, has OP oncology visit on Tuesday   - low suspicion for PNA (clinical history not supportive of PNA and procal negative). Low threshold to dc Zosyn, d/w pulm - infx work up negative, completed 3d zosyn   -Pulmonology on consult - OP f/u if req home o2   -Supplemental oxygen, wean as able  - DuoNebs     AAA  CHF  PFO  PVD  HTN  - Asa, statin   - Lasix,  lisinopril, metop      HLD - ezetimibe, statin    GERD - PPI          Consults: IP CONSULT TO HOSPITALIST  IP CONSULT TO PULMONOLOGY    Operative Procedures:        Patient instructions:      I as the attending physician reconciled the current and discharge medications on day of discharge.     Current Discharge Medication List        START taking these medications    Details   apixaban 5 MG Oral Tab Take 2 tabs (10mg) by mouth twice daily for 7 days, then take 1 tab (5mg) by mouth twice daily thereafter.           CONTINUE these medications which have NOT CHANGED    Details   metoprolol succinate 50 MG Oral Tablet 24 Hr Take 1 tablet (50 mg total) by mouth daily.      lisinopril 20 MG Oral Tab Take 1 tablet (20 mg total) by mouth daily.      Rosuvastatin Calcium 20 MG Oral Tab       OMEPRAZOLE 20 MG Oral Capsule Delayed Release TAKE 1 CAPSULE(20 MG) BY MOUTH DAILY      Calcium Polycarbophil (FIBER) 625 MG Oral Tab Take by mouth.      ASPIRIN 81 MG OR TABS 1 TABLET DAILY      FISH OIL 1000 MG OR CAPS 1 daily      VITAMIN B COMPLEX IJ 1 Q DAY      furosemide 20 MG Oral Tab Take 1 tablet (20 mg total) by mouth once daily.      Albuterol Sulfate  (90 Base) MCG/ACT Inhalation Aero Soln 2 puffs 15 minutes before exercise.      Calcium Carbonate-Vitamin D (CALTRATE 600+D OR)       Garlic (GNP GARLIC EXTRACT) 400 MG Oral Tab            STOP taking these medications       Mometasone Furoate 0.1 % External Solution        Clopidogrel Bisulfate 75 MG Oral Tab        Magnesium 400 MG Oral Cap        ezetimibe 10 MG Oral Tab        Lovastatin 20 MG Oral Tab              Activity: activity as tolerated  Diet: regular diet  Wound Care: as directed  Code Status: Full Code      Discharge Exam:     General: no acute distress, alert and oriented x 3  Heart: RRR  Lungs: clear bilaterally, no active wheezing  Abdomen: nontender, nondistended, intact BS  Extremities: no pedal edema   Neuro: CN inact, no focal deficits      Total  time coordinating care for discharge: Greater than 30 minutes    Jaz Mcdonald MD  [facility]

## 2024-03-15 NOTE — DISCHARGE INSTRUCTIONS
Sometimes managing your health at home requires assistance.  The [facility] team has recognized your preference to use [facility].  They can be reached by phone at [phone].  The fax number for your reference is [phone].  A representative from the home health agency will contact you or your family to schedule your first visit.

## 2024-03-15 NOTE — CM/SW NOTE
SW notified of need for home oxygen at [place]. Referral started, awaiting for orders/face to face to be completed. Notified Pulm. Due to need for oxygen, will coordinate [facility] at [place] for home assessment/eval. Referrals sent.     SW to follow up with pt/family and DC plans once services coordinated.     Addendum 3:15- SW uploaded verbiage and orders to oxygen company.  Awaiting for final approval for home oxygen.     Addendum 4:30- SW awaiting for final oxygen approval. Pt/family made aware. Agreeable to [facility] at [place].    5:00- required documentation in [facility] referral for Steve. Pt approved. Called [facility] to deliver Steve tank.     Steve [phone]      FAUSTO Garner

## 2024-03-15 NOTE — PROGRESS NOTES
[facility]    Mojgan Jefferson Patient Status:  Inpatient    1945 MRN [id number]   Location [facility] 7NE-A Attending Latoya Mcdonald*   Hosp Day # 2 PCP Sunny Reyes MD     SUBJECTIVE: Pt states that she feels well.      OBJECTIVE:  /54 (BP Location: Left arm)   Pulse 87   Temp 98.3 °F (36.8 °C) (Oral)   Resp 18   Ht 5' 1\" (1.549 m)   Wt 135 lb 9.6 oz (61.5 kg)   SpO2 98%   BMI 25.62 kg/m²   O2 requirement: 2L     I/O last 3 completed shifts:  In: -   Out: 120 [Urine:120]  No intake/output data recorded.     Current Medications:   Current Facility-Administered Medications:     magnesium oxide (Mag-Ox) tab 400 mg, 400 mg, Oral, Once    piperacillin-tazobactam (Zosyn) 3.375 g in dextrose 5% 100 mL IVPB-ADDV, 3.375 g, Intravenous, Q8H    acetaminophen (Tylenol) tab 650 mg, 650 mg, Oral, Q4H PRN **OR** HYDROcodone-acetaminophen (Norco) 5-325 MG per tab 1 tablet, 1 tablet, Oral, Q4H PRN **OR** HYDROcodone-acetaminophen (Norco) 5-325 MG per tab 2 tablet, 2 tablet, Oral, Q4H PRN    morphINE PF 2 MG/ML injection 1 mg, 1 mg, Intravenous, Q2H PRN **OR** morphINE PF 2 MG/ML injection 2 mg, 2 mg, Intravenous, Q2H PRN **OR** morphINE PF 4 MG/ML injection 4 mg, 4 mg, Intravenous, Q2H PRN    polyethylene glycol (PEG 3350) (Miralax) 17 g oral packet 17 g, 17 g, Oral, Daily PRN    sennosides (Senokot) tab 17.2 mg, 17.2 mg, Oral, Nightly PRN    bisacodyl (Dulcolax) 10 MG rectal suppository 10 mg, 10 mg, Rectal, Daily PRN    fleet enema (Fleet) 7-19 GM/118ML rectal enema 133 mL, 1 enema, Rectal, Once PRN    ondansetron (Zofran) 4 MG/2ML injection 4 mg, 4 mg, Intravenous, Q6H PRN    metoclopramide (Reglan) 5 mg/mL injection 5 mg, 5 mg, Intravenous, Q8H PRN    ipratropium-albuterol (Duoneb) 0.5-2.5 (3) MG/3ML inhalation solution 3 mL, 3 mL, Nebulization, Q6H PRN    hydrALAzine (Apresoline) 20 mg/mL injection 10 mg, 10 mg, Intravenous, Q6H PRN    fluticasone furoate-vilanterol (Breo Ellipta)  100-25 MCG/ACT inhaler 1 puff, 1 puff, Inhalation, Daily    albuterol (Ventolin HFA) 108 (90 Base) MCG/ACT inhaler 1 puff, 1 puff, Inhalation, Q6H PRN    aspirin DR tab 81 mg, 81 mg, Oral, Daily    furosemide (Lasix) tab 20 mg, 20 mg, Oral, QOD    metoprolol succinate ER (Toprol XL) 24 hr tab 50 mg, 50 mg, Oral, Daily    rosuvastatin (Crestor) tab 20 mg, 20 mg, Oral, Nightly    melatonin tab 3 mg, 3 mg, Oral, Nightly PRN    heparin (Porcine) 92527 units/250mL infusion PE/DVT/THROMBUS CONTINUOUS, 200-3,000 Units/hr, Intravenous, Continuous    lidocaine-menthol 4-1 % patch 1 patch, 1 patch, Transdermal, Daily      Physical Exam:                          General: alert, cooperative, in NAD                          HEENT: oropharynx clear without erythema or exudates, moist mucous membranes                          Lungs: Clear to auscultation bilaterally, no wheezes or crackles                           Chest wall: No tenderness or deformity.                          Heart: Regular rate and rhythm, normal S1S2                          Abdomen: soft, non-tender, non-distended, positive BS.                          Extremity: No clubbing or cyanosis. no edema                          Skin: No rashes or lesions.     Lab Results   Component Value Date    WBC 9.1 03/15/2024    RBC 3.85 03/15/2024    HGB 12.3 03/15/2024    HCT 35.1 03/15/2024    MCV 91.2 03/15/2024    MCH 31.9 03/15/2024    MCHC 35.0 03/15/2024    RDW 12.9 03/15/2024    .0 03/15/2024     Lab Results   Component Value Date     03/15/2024    K 3.8 03/15/2024    K 3.8 03/15/2024    CL 99 03/15/2024    CO2 26.0 03/15/2024    BUN 9 03/15/2024    CREATSERUM 0.82 03/15/2024     03/15/2024    CA 9.2 03/15/2024     Lab Results   Component Value Date    PT 12.9 07/18/2011    INR 0.91 03/12/2024    INR 0.96 07/18/2011          Imaging: I have independently visualized all relevant chest imaging in PACS.  I agree with the radiology interpretation  except where noted.               3/2024                                                                   12/2023    Chest images personally reviewed.       ASSESSMENT/PLAN:  Abnormal CT   -? Post obstructive pneumonia with obstruction of upper airway or lymphangitic spread vs pneumonia. Some presence in 12/2023.   -PCT negative, WBC count normal and afebrile   -urine strep/legionella negative  -RVP negative   -on empiric zosyn (3/12 -  ), will stop given negative cultures   Pulmonary embolism, provoked with metastatic lung cancer as risk factor.   -?RUL on images I see a GABRIEL filling defect. Regardless would recommend continued IV heparin.   -Venous dopplers negative for DVT  -Echo without evidence of RV strain   -would convert to DOAC eliquis and plan indefinite treatment.   Lung cancer.   -Metastatic adenocarcinoma, PDL 60% dx [id number] via supraclav LN bx.   -RUL lung masses intiially and brain mets and noted diffuse lung mets. Following with [facility] and Dr. Ashvin Mitchell [facility]. On PDL inhibitor with adjunct trial.    Suspect COPD  -Wean O2  -Added breo   -Outpt PFT in 3 months.   Dispo:  -will follow   -please complete and document ambulatory O2 eval, pt may require supplemental oxygen on discharge  -pt to follow up closely with pulm in 2 weeks    Emili Macias MD    ADDENDUM:  Home O2 concentrator and portable tanks at 2 liters NC to be used with activity.   Diagnosis: Pulmonary embolism, provoked with metastatic lung cancer, suspect COPD. CHF. Duration of treatment: 99mo Evaluate for conserving device. NPI # [id number]   I have seen this patient and reviewed oxygen study.     Naveed MAC

## 2024-03-15 NOTE — PLAN OF CARE
Assumed care at 1900.   Alert and oriented x4.   NSR; 2L NC; PRN meds for pain management.   Pt able to ambulate to bathroom.   Heparin gtt infusing per protocol.   Fall precautions in place and call light within reach.     Problem: RESPIRATORY - ADULT  Goal: Achieves optimal ventilation and oxygenation  Description: INTERVENTIONS:  - Assess for changes in respiratory status  - Assess for changes in mentation and behavior  - Position to facilitate oxygenation and minimize respiratory effort  - Oxygen supplementation based on oxygen saturation or ABGs  - Provide Smoking Cessation handout, if applicable  - Encourage broncho-pulmonary hygiene including cough, deep breathe, Incentive Spirometry  - Assess the need for suctioning and perform as needed  - Assess and instruct to report SOB or any respiratory difficulty  - Respiratory Therapy support as indicated  - Manage/alleviate anxiety  - Monitor for signs/symptoms of CO2 retention  Outcome: Progressing     Problem: SAFETY ADULT - FALL  Goal: Free from fall injury  Description: INTERVENTIONS:  - Assess pt frequently for physical needs  - Identify cognitive and physical deficits and behaviors that affect risk of falls.  - [place] fall precautions as indicated by assessment.  - Educate pt/family on patient safety including physical limitations  - Instruct pt to call for assistance with activity based on assessment  - Modify environment to reduce risk of injury  - Provide assistive devices as appropriate  - Consider OT/PT consult to assist with strengthening/mobility  - Encourage toileting schedule  Outcome: Progressing

## 2024-03-15 NOTE — PROGRESS NOTES
O2 walk     03/15/24 1200   Mobility   O2 walk? Yes   SPO2% on Oxygen at Rest 94   At rest oxygen flow (liters per minute) 1   SPO2% Ambulation on Oxygen 79   Ambulation oxygen flow (liters per minute) 2

## 2024-03-15 NOTE — PLAN OF CARE
Assumed care at 0730  Patient alert, oriented x4  VSS, Weaned to 1L NC at rest, dyspena with exertion, NSR on tele   Denies pain   Heparin gtt stopped, eliquis given   O2 walk done  Tolerating diet well  Voiding in bathroom, up SB assist  Ambulating in halls  Call light within reach     AVS reviewed with patient and family   All questions answered  Belongings and O2 sent with patient    NURSING DISCHARGE NOTE    Discharged Home via Wheelchair.  Accompanied by Family member and Support staff  Belongings Taken by patient/family.

## 2024-03-15 NOTE — PROGRESS NOTES
03/15/24 1200   Mobility   O2 walk? Yes   SPO2% on Room Air at Rest 79   SPO2% on Oxygen at Rest 94   At rest oxygen flow (liters per minute) 1   SPO2% Ambulation on Room Air 83   SPO2% Ambulation on Oxygen 89   Ambulation oxygen flow (liters per minute) 2

## 2024-04-02 ENCOUNTER — E-ADVICE (OUTPATIENT)
Dept: CARDIOLOGY | Age: 79
End: 2024-04-02

## 2024-04-03 ASSESSMENT — PATIENT HEALTH QUESTIONNAIRE - PHQ9
2. FEELING DOWN, DEPRESSED OR HOPELESS: NOT AT ALL
1. LITTLE INTEREST OR PLEASURE IN DOING THINGS: NOT AT ALL
CLINICAL INTERPRETATION OF PHQ2 SCORE: NO FURTHER SCREENING NEEDED
SUM OF ALL RESPONSES TO PHQ9 QUESTIONS 1 AND 2: 0
SUM OF ALL RESPONSES TO PHQ9 QUESTIONS 1 AND 2: 0

## 2024-04-04 ENCOUNTER — OFFICE VISIT (OUTPATIENT)
Dept: CARDIOLOGY | Age: 79
End: 2024-04-04

## 2024-04-04 VITALS
HEIGHT: 61 IN | BODY MASS INDEX: 25.39 KG/M2 | RESPIRATION RATE: 16 BRPM | DIASTOLIC BLOOD PRESSURE: 73 MMHG | SYSTOLIC BLOOD PRESSURE: 137 MMHG | WEIGHT: 134.48 LBS | HEART RATE: 81 BPM

## 2024-04-04 DIAGNOSIS — Z79.01 CURRENT USE OF LONG TERM ANTICOAGULATION: ICD-10-CM

## 2024-04-04 DIAGNOSIS — Z82.49 FAMILY HISTORY OF CORONARY ARTERIOSCLEROSIS: ICD-10-CM

## 2024-04-04 DIAGNOSIS — I27.20 PULMONARY HTN (CMD): ICD-10-CM

## 2024-04-04 DIAGNOSIS — I73.9 PAD (PERIPHERAL ARTERY DISEASE) (CMD): ICD-10-CM

## 2024-04-04 DIAGNOSIS — E78.2 HYPERLIPIDEMIA, MIXED: ICD-10-CM

## 2024-04-04 DIAGNOSIS — I50.32 CHRONIC CONGESTIVE HEART FAILURE WITH LEFT VENTRICULAR DIASTOLIC DYSFUNCTION (CMD): ICD-10-CM

## 2024-04-04 DIAGNOSIS — I77.811 AORTIC ECTASIA, ABDOMINAL (CMD): Primary | ICD-10-CM

## 2024-04-04 DIAGNOSIS — Z86.711 HISTORY OF PULMONARY EMBOLISM: ICD-10-CM

## 2024-04-04 DIAGNOSIS — I25.10 CAD IN NATIVE ARTERY: ICD-10-CM

## 2024-04-04 DIAGNOSIS — I10 ESSENTIAL HYPERTENSION: ICD-10-CM

## 2024-04-04 SDOH — HEALTH STABILITY: PHYSICAL HEALTH: ON AVERAGE, HOW MANY MINUTES DO YOU ENGAGE IN EXERCISE AT THIS LEVEL?: 0 MIN

## 2024-04-04 SDOH — HEALTH STABILITY: PHYSICAL HEALTH: ON AVERAGE, HOW MANY DAYS PER WEEK DO YOU ENGAGE IN MODERATE TO STRENUOUS EXERCISE (LIKE A BRISK WALK)?: 0 DAYS

## 2024-04-04 ASSESSMENT — ENCOUNTER SYMPTOMS
WEIGHT LOSS: 0
FEVER: 0
SUSPICIOUS LESIONS: 0
COUGH: 0
WEIGHT GAIN: 0
BACK PAIN: 1
CHILLS: 0
HEMATOCHEZIA: 0
HEMOPTYSIS: 0
BRUISES/BLEEDS EASILY: 0
ALLERGIC/IMMUNOLOGIC COMMENTS: NO NEW FOOD ALLERGIES

## 2024-04-05 LAB
ATRIAL RATE (BPM): 76
P AXIS (DEGREES): 50
PR-INTERVAL (MSEC): 186
QRS-INTERVAL (MSEC): 88
QT-INTERVAL (MSEC): 384
QTC: 432
R AXIS (DEGREES): 14
REPORT TEXT: NORMAL
T AXIS (DEGREES): 50
VENTRICULAR RATE EKG/MIN (BPM): 76

## 2024-04-09 ENCOUNTER — HOSPITAL ENCOUNTER (EMERGENCY)
Facility: HOSPITAL | Age: 79
Discharge: HOME | End: 2024-04-09
Attending: EMERGENCY MEDICINE
Payer: MEDICARE

## 2024-04-09 ENCOUNTER — TELEPHONE (OUTPATIENT)
Dept: CARDIOLOGY | Age: 79
End: 2024-04-09

## 2024-04-09 VITALS
DIASTOLIC BLOOD PRESSURE: 64 MMHG | BODY MASS INDEX: 25.86 KG/M2 | HEART RATE: 75 BPM | TEMPERATURE: 98 F | WEIGHT: 137 LBS | SYSTOLIC BLOOD PRESSURE: 123 MMHG | HEIGHT: 61 IN | RESPIRATION RATE: 18 BRPM | OXYGEN SATURATION: 97 %

## 2024-04-09 DIAGNOSIS — E87.1 HYPONATREMIA: Primary | ICD-10-CM

## 2024-04-09 LAB
ALBUMIN SERPL-MCNC: 3.9 G/DL (ref 3.4–5)
ALBUMIN/GLOB SERPL: 1.1 {RATIO} (ref 1–2)
ALP LIVER SERPL-CCNC: 96 U/L
ALT SERPL-CCNC: 28 U/L
ANION GAP SERPL CALC-SCNC: 6 MMOL/L (ref 0–18)
AST SERPL-CCNC: 29 U/L (ref 15–37)
BASOPHILS # BLD AUTO: 0.07 X10(3) UL (ref 0–0.2)
BASOPHILS NFR BLD AUTO: 0.7 %
BILIRUB SERPL-MCNC: 0.6 MG/DL (ref 0.1–2)
BILIRUB UR QL STRIP.AUTO: NEGATIVE
BUN BLD-MCNC: 15 MG/DL (ref 9–23)
CALCIUM BLD-MCNC: 10 MG/DL (ref 8.5–10.1)
CHLORIDE SERPL-SCNC: 95 MMOL/L (ref 98–112)
CLARITY UR REFRACT.AUTO: CLEAR
CO2 SERPL-SCNC: 29 MMOL/L (ref 21–32)
CREAT BLD-MCNC: 0.85 MG/DL
EGFRCR SERPLBLD CKD-EPI 2021: 69 ML/MIN/1.73M2 (ref 60–?)
EOSINOPHIL # BLD AUTO: 0.52 X10(3) UL (ref 0–0.7)
EOSINOPHIL NFR BLD AUTO: 5.5 %
ERYTHROCYTE [DISTWIDTH] IN BLOOD BY AUTOMATED COUNT: 12.7 %
GLOBULIN PLAS-MCNC: 3.5 G/DL (ref 2.8–4.4)
GLUCOSE BLD-MCNC: 128 MG/DL (ref 70–99)
GLUCOSE UR STRIP.AUTO-MCNC: NORMAL MG/DL
HCT VFR BLD AUTO: 38 %
HGB BLD-MCNC: 13.1 G/DL
IMM GRANULOCYTES # BLD AUTO: 0.02 X10(3) UL (ref 0–1)
IMM GRANULOCYTES NFR BLD: 0.2 %
KETONES UR STRIP.AUTO-MCNC: NEGATIVE MG/DL
LEUKOCYTE ESTERASE UR QL STRIP.AUTO: NEGATIVE
LYMPHOCYTES # BLD AUTO: 1.56 X10(3) UL (ref 1–4)
LYMPHOCYTES NFR BLD AUTO: 16.6 %
MCH RBC QN AUTO: 31.2 PG (ref 26–34)
MCHC RBC AUTO-ENTMCNC: 34.5 G/DL (ref 31–37)
MCV RBC AUTO: 90.5 FL
MONOCYTES # BLD AUTO: 0.5 X10(3) UL (ref 0.1–1)
MONOCYTES NFR BLD AUTO: 5.3 %
NEUTROPHILS # BLD AUTO: 6.74 X10 (3) UL (ref 1.5–7.7)
NEUTROPHILS # BLD AUTO: 6.74 X10(3) UL (ref 1.5–7.7)
NEUTROPHILS NFR BLD AUTO: 71.7 %
NITRITE UR QL STRIP.AUTO: NEGATIVE
OSMOLALITY SERPL CALC.SUM OF ELEC: 272 MOSM/KG (ref 275–295)
PH UR STRIP.AUTO: 6 [PH] (ref 5–8)
PLATELET # BLD AUTO: 343 10(3)UL (ref 150–450)
POTASSIUM SERPL-SCNC: 3.9 MMOL/L (ref 3.5–5.1)
PROT SERPL-MCNC: 7.4 G/DL (ref 6.4–8.2)
PROT UR STRIP.AUTO-MCNC: NEGATIVE MG/DL
RBC # BLD AUTO: 4.2 X10(6)UL
RBC UR QL AUTO: NEGATIVE
SODIUM SERPL-SCNC: 130 MMOL/L (ref 136–145)
SP GR UR STRIP.AUTO: 1.01 (ref 1–1.03)
UROBILINOGEN UR STRIP.AUTO-MCNC: NORMAL MG/DL
WBC # BLD AUTO: 9.4 X10(3) UL (ref 4–11)

## 2024-04-09 PROCEDURE — 99283 EMERGENCY DEPT VISIT LOW MDM: CPT

## 2024-04-09 PROCEDURE — 80053 COMPREHEN METABOLIC PANEL: CPT | Performed by: EMERGENCY MEDICINE

## 2024-04-09 PROCEDURE — 85025 COMPLETE CBC W/AUTO DIFF WBC: CPT

## 2024-04-09 PROCEDURE — 81003 URINALYSIS AUTO W/O SCOPE: CPT

## 2024-04-09 PROCEDURE — 36415 COLL VENOUS BLD VENIPUNCTURE: CPT

## 2024-04-09 PROCEDURE — 81003 URINALYSIS AUTO W/O SCOPE: CPT | Performed by: EMERGENCY MEDICINE

## 2024-04-09 PROCEDURE — 85025 COMPLETE CBC W/AUTO DIFF WBC: CPT | Performed by: EMERGENCY MEDICINE

## 2024-04-09 PROCEDURE — 80053 COMPREHEN METABOLIC PANEL: CPT

## 2024-04-10 NOTE — ED INITIAL ASSESSMENT (HPI)
Pt states she is being treated for Lung CA at the [facility], getting immunotherapy. States she was sent for Na-125. Pt on cont O2 at 2L/min via NC. She denies CP or pain elsewhere, denies TONYA, \"nothing more than usual.\" States she was seen here 2 wks ago, dx PE, pt placed on Apixaban    Pt denies n/v, denies dizziness/lightheadedness

## 2024-04-10 NOTE — ED PROVIDER NOTES
Patient Seen in: [facility] Emergency Department      History     Chief Complaint   Patient presents with    Abnormal Labs     Stated Complaint: low na on blood draw today    Subjective:     HPI    78-year-old woman with hypertension, COPD, CHF (furosemide), and being actively treated for lung cancer [facility].  She is on oxygen 24/7.  He has chronic hyponatremia.  She does try to hydrate with smart water.  She is never been worked up for SIADH as far she knows.  She says she has no symptoms such as chest pain, abdominal pain, or worsening shortness of breath.  She was sent to the emergency department by her doctors at [facility] because her sodium was 125.    Objective:   No pertinent past medical history.            No pertinent past surgical history.              No pertinent social history.            Review of Systems    Positive for stated complaint: low na on blood draw today  Other systems are as noted in HPI.  Constitutional and vital signs reviewed.      All other systems reviewed and negative except as noted above.    Physical Exam     ED Triage Vitals [04/09/24 1928]   /75   Pulse 82   Resp 22   Temp 97.9 °F (36.6 °C)   Temp src Temporal   SpO2 98 %   O2 Device Nasal cannula       Current:/64   Pulse 75   Temp 97.9 °F (36.6 °C) (Temporal)   Resp 18   Ht 154.9 cm (5' 1\")   Wt 62.1 kg   SpO2 97%   BMI 25.89 kg/m²       General:  Vitals as listed.  No acute distress   HEENT: Sclerae anicteric.  Conjunctivae show no pallor.  Oropharynx clear, mucous membranes moist   Lungs: good air exchange  Extremities: no edema, normal peripheral pulses   Neuro: Alert oriented and nonfocal    ED Course     Labs Reviewed   COMP METABOLIC PANEL (14) - Abnormal; Notable for the following components:       Result Value    Glucose 128 (*)     Sodium 130 (*)     Chloride 95 (*)     Calculated Osmolality 272 (*)     All other components within normal limits   CBC WITH DIFFERENTIAL  WITH PLATELET    Narrative:     The following orders were created for panel order CBC With Differential With Platelet.  Procedure                               Abnormality         Status                     ---------                               -----------         ------                     CBC W/ DIFFERENTIAL[[id number]]                              Final result                 Please view results for these tests on the individual orders.   URINALYSIS, ROUTINE   RAINBOW DRAW LAVENDER   RAINBOW DRAW LIGHT GREEN   CBC W/ DIFFERENTIAL       ED COURSE and MDM     Sources of the medical history included the patient and her daughter who accompanies her.    I reviewed prior external notes including evaluation by Dr. Macias on 4/2/2024 pulmonologist.  She documented the patient has metastatic lung cancer.  She was found to have a pulmonary embolism.  Pulmonary embolism (Eliquis)    Patient given information about SIADH.  She will discuss this with her oncologist at [facility] to determine if she needs confirmatory testing    I have discussed with the patient the results of testing, differential diagnosis, and treatment plan. They expressed clear understanding of these instructions and agrees to the plan provided.    Disposition and Plan     Clinical Impression:  1. Hyponatremia         Disposition:  Discharge  4/9/2024  8:30 pm    Follow-up:  Mellissa Zuniga  [address] [facility]  [address]  [facility] [address]  [phone]    Schedule an appointment as soon as possible for a visit in 1 week(s)          Medications Prescribed:  Current Discharge Medication List

## 2024-04-10 NOTE — DISCHARGE INSTRUCTIONS
Drink plenty of fluids - especially low-calorie sports drinks like Gatorade.      The syndrome of inappropriate antidiuretic hormone secretion (\"SIADH\") is a condition that happens when the body makes too much antidiuretic hormone (\"ADH\"). ADH helps manage the amount of water in the body. If you have too much ADH, your kidneys can't get rid of water. Fluid builds up in your body and dilutes the amount of salt in your blood. This causes a condition called \"hyponatremia.\"    Many things can cause SIADH, including:    ?Medicines - These include certain medicines used to treat type 2 diabetes, high blood pressure, seizures, and cancer.    ?Infections such as pneumonia    ?Surgery    ?Brain and spinal cord problems - This includes strokes, bleeding, or an infection in the brain.    ?Cancer - This includes different types of lung cancer (especially small cell) and cancer of the stomach, pancreas, or brain.    \  The symptoms are the same as the symptoms of hyponatremia, which include:    ?Nausea and vomiting    ?Headache    ?Confusion or trouble thinking clearly    ?Feeling weak or tired    ?Feeling restless or irritable    ?Muscle weakness, spasms, or cramps    ?Seizures or passing out

## 2024-05-09 ENCOUNTER — E-ADVICE (OUTPATIENT)
Dept: CARDIOLOGY | Age: 79
End: 2024-05-09

## 2024-05-10 RX ORDER — LISINOPRIL 20 MG/1
20 TABLET ORAL DAILY
COMMUNITY

## 2024-06-24 ENCOUNTER — ANCILLARY PROCEDURE (OUTPATIENT)
Dept: CARDIOLOGY | Age: 79
End: 2024-06-24
Attending: INTERNAL MEDICINE

## 2024-06-24 ENCOUNTER — TELEPHONE (OUTPATIENT)
Dept: CARDIOLOGY | Age: 79
End: 2024-06-24

## 2024-06-24 ENCOUNTER — APPOINTMENT (OUTPATIENT)
Dept: CARDIOLOGY | Age: 79
End: 2024-06-24
Attending: INTERNAL MEDICINE

## 2024-06-24 DIAGNOSIS — I73.9 CLAUDICATION (CMD): ICD-10-CM

## 2024-06-24 PROCEDURE — 93925 LOWER EXTREMITY STUDY: CPT | Performed by: INTERNAL MEDICINE

## 2024-06-24 PROCEDURE — 93975 VASCULAR STUDY: CPT | Performed by: INTERNAL MEDICINE

## 2024-07-15 PROBLEM — I27.20 PULMONARY HTN  (CMD): Status: RESOLVED | Noted: 2018-05-24 | Resolved: 2024-07-15

## 2024-07-15 ASSESSMENT — ENCOUNTER SYMPTOMS
BRUISES/BLEEDS EASILY: 0
BACK PAIN: 1
WEIGHT GAIN: 0
CHILLS: 0
HEMATOCHEZIA: 0
COUGH: 0
SUSPICIOUS LESIONS: 0
HEMOPTYSIS: 0
FEVER: 0
ALLERGIC/IMMUNOLOGIC COMMENTS: NO NEW FOOD ALLERGIES
WEIGHT LOSS: 0

## 2024-07-17 ENCOUNTER — APPOINTMENT (OUTPATIENT)
Dept: CARDIOLOGY | Age: 79
End: 2024-07-17

## 2024-07-17 VITALS
SYSTOLIC BLOOD PRESSURE: 119 MMHG | BODY MASS INDEX: 24.04 KG/M2 | WEIGHT: 127.21 LBS | HEART RATE: 78 BPM | DIASTOLIC BLOOD PRESSURE: 67 MMHG

## 2024-07-17 DIAGNOSIS — Z79.01 CURRENT USE OF LONG TERM ANTICOAGULATION: ICD-10-CM

## 2024-07-17 DIAGNOSIS — I50.32 CHRONIC CONGESTIVE HEART FAILURE WITH LEFT VENTRICULAR DIASTOLIC DYSFUNCTION  (CMD): ICD-10-CM

## 2024-07-17 DIAGNOSIS — E78.2 HYPERLIPIDEMIA, MIXED: ICD-10-CM

## 2024-07-17 DIAGNOSIS — I27.20 PULMONARY HTN  (CMD): Primary | ICD-10-CM

## 2024-07-17 DIAGNOSIS — Z82.49 FAMILY HISTORY OF CORONARY ARTERIOSCLEROSIS: ICD-10-CM

## 2024-07-17 DIAGNOSIS — I77.811 AORTIC ECTASIA, ABDOMINAL (CMD): ICD-10-CM

## 2024-07-17 DIAGNOSIS — I25.10 CAD IN NATIVE ARTERY: ICD-10-CM

## 2024-07-17 DIAGNOSIS — I73.9 PAD (PERIPHERAL ARTERY DISEASE) (CMD): ICD-10-CM

## 2024-07-17 DIAGNOSIS — Z86.711 HISTORY OF PULMONARY EMBOLISM: ICD-10-CM

## 2024-07-17 DIAGNOSIS — I10 ESSENTIAL HYPERTENSION: ICD-10-CM

## 2024-07-17 SDOH — HEALTH STABILITY: PHYSICAL HEALTH: ON AVERAGE, HOW MANY MINUTES DO YOU ENGAGE IN EXERCISE AT THIS LEVEL?: 30 MIN

## 2024-07-17 SDOH — HEALTH STABILITY: PHYSICAL HEALTH: ON AVERAGE, HOW MANY DAYS PER WEEK DO YOU ENGAGE IN MODERATE TO STRENUOUS EXERCISE (LIKE A BRISK WALK)?: 4 DAYS

## 2024-07-17 ASSESSMENT — PATIENT HEALTH QUESTIONNAIRE - PHQ9
2. FEELING DOWN, DEPRESSED OR HOPELESS: NOT AT ALL
SUM OF ALL RESPONSES TO PHQ9 QUESTIONS 1 AND 2: 0
SUM OF ALL RESPONSES TO PHQ9 QUESTIONS 1 AND 2: 0
CLINICAL INTERPRETATION OF PHQ2 SCORE: NO FURTHER SCREENING NEEDED
1. LITTLE INTEREST OR PLEASURE IN DOING THINGS: NOT AT ALL

## 2024-07-30 ENCOUNTER — ORDER TRANSCRIPTION (OUTPATIENT)
Dept: CARDIAC REHAB | Facility: HOSPITAL | Age: 79
End: 2024-07-30

## 2024-07-30 DIAGNOSIS — C34.90 LUNG CANCER (HCC): Primary | ICD-10-CM

## 2024-08-05 ENCOUNTER — CARDPULM VISIT (OUTPATIENT)
Dept: CARDIAC REHAB | Facility: HOSPITAL | Age: 79
End: 2024-08-05
Attending: INTERNAL MEDICINE
Payer: MEDICARE

## 2024-08-13 ENCOUNTER — CARDPULM VISIT (OUTPATIENT)
Dept: CARDIAC REHAB | Facility: HOSPITAL | Age: 79
End: 2024-08-13
Attending: INTERNAL MEDICINE
Payer: MEDICARE

## 2024-08-15 ENCOUNTER — APPOINTMENT (OUTPATIENT)
Dept: CARDIAC REHAB | Facility: HOSPITAL | Age: 79
End: 2024-08-15
Attending: INTERNAL MEDICINE
Payer: MEDICARE

## 2024-08-20 ENCOUNTER — CARDPULM VISIT (OUTPATIENT)
Dept: CARDIAC REHAB | Facility: HOSPITAL | Age: 79
End: 2024-08-20
Attending: INTERNAL MEDICINE
Payer: MEDICARE

## 2024-08-22 ENCOUNTER — CARDPULM VISIT (OUTPATIENT)
Dept: CARDIAC REHAB | Facility: HOSPITAL | Age: 79
End: 2024-08-22
Attending: INTERNAL MEDICINE
Payer: MEDICARE

## 2024-08-27 ENCOUNTER — APPOINTMENT (OUTPATIENT)
Dept: CARDIAC REHAB | Facility: HOSPITAL | Age: 79
End: 2024-08-27
Attending: INTERNAL MEDICINE
Payer: MEDICARE

## 2024-08-29 ENCOUNTER — CARDPULM VISIT (OUTPATIENT)
Dept: CARDIAC REHAB | Facility: HOSPITAL | Age: 79
End: 2024-08-29
Attending: INTERNAL MEDICINE
Payer: MEDICARE

## 2024-09-03 ENCOUNTER — CARDPULM VISIT (OUTPATIENT)
Dept: CARDIAC REHAB | Facility: HOSPITAL | Age: 79
End: 2024-09-03
Attending: INTERNAL MEDICINE
Payer: MEDICARE

## 2024-09-05 ENCOUNTER — CARDPULM VISIT (OUTPATIENT)
Dept: CARDIAC REHAB | Facility: HOSPITAL | Age: 79
End: 2024-09-05
Attending: INTERNAL MEDICINE
Payer: MEDICARE

## 2024-09-10 ENCOUNTER — CARDPULM VISIT (OUTPATIENT)
Dept: CARDIAC REHAB | Facility: HOSPITAL | Age: 79
End: 2024-09-10
Attending: INTERNAL MEDICINE
Payer: MEDICARE

## 2024-09-12 ENCOUNTER — APPOINTMENT (OUTPATIENT)
Dept: CARDIAC REHAB | Facility: HOSPITAL | Age: 79
End: 2024-09-12
Attending: INTERNAL MEDICINE
Payer: MEDICARE

## 2024-09-17 ENCOUNTER — APPOINTMENT (OUTPATIENT)
Dept: CARDIAC REHAB | Facility: HOSPITAL | Age: 79
End: 2024-09-17
Attending: INTERNAL MEDICINE
Payer: MEDICARE

## 2024-09-19 ENCOUNTER — APPOINTMENT (OUTPATIENT)
Dept: CARDIAC REHAB | Facility: HOSPITAL | Age: 79
End: 2024-09-19
Attending: INTERNAL MEDICINE
Payer: MEDICARE

## 2024-09-24 ENCOUNTER — CARDPULM VISIT (OUTPATIENT)
Dept: CARDIAC REHAB | Facility: HOSPITAL | Age: 79
End: 2024-09-24
Attending: INTERNAL MEDICINE
Payer: MEDICARE

## 2024-09-26 ENCOUNTER — CARDPULM VISIT (OUTPATIENT)
Dept: CARDIAC REHAB | Facility: HOSPITAL | Age: 79
End: 2024-09-26
Attending: INTERNAL MEDICINE
Payer: MEDICARE

## 2024-10-01 ENCOUNTER — APPOINTMENT (OUTPATIENT)
Dept: CARDIAC REHAB | Facility: HOSPITAL | Age: 79
End: 2024-10-01
Attending: INTERNAL MEDICINE
Payer: MEDICARE

## 2024-10-03 ENCOUNTER — APPOINTMENT (OUTPATIENT)
Dept: CARDIAC REHAB | Facility: HOSPITAL | Age: 79
End: 2024-10-03
Attending: INTERNAL MEDICINE
Payer: MEDICARE

## 2024-10-05 ENCOUNTER — APPOINTMENT (OUTPATIENT)
Dept: GENERAL RADIOLOGY | Facility: HOSPITAL | Age: 79
End: 2024-10-05
Attending: EMERGENCY MEDICINE
Payer: MEDICARE

## 2024-10-05 ENCOUNTER — HOSPITAL ENCOUNTER (INPATIENT)
Facility: HOSPITAL | Age: 79
LOS: 3 days | Discharge: HOME HEALTH | End: 2024-10-08
Attending: EMERGENCY MEDICINE | Admitting: HOSPITALIST
Payer: MEDICARE

## 2024-10-05 ENCOUNTER — APPOINTMENT (OUTPATIENT)
Dept: CT IMAGING | Facility: HOSPITAL | Age: 79
End: 2024-10-05
Attending: EMERGENCY MEDICINE
Payer: MEDICARE

## 2024-10-05 DIAGNOSIS — Z00.00 HEALTHCARE MAINTENANCE: ICD-10-CM

## 2024-10-05 DIAGNOSIS — I10 ESSENTIAL HYPERTENSION: ICD-10-CM

## 2024-10-05 DIAGNOSIS — N17.9 AKI (ACUTE KIDNEY INJURY) (HCC): ICD-10-CM

## 2024-10-05 DIAGNOSIS — E87.20 METABOLIC ACIDOSIS: Primary | ICD-10-CM

## 2024-10-05 DIAGNOSIS — C34.90 PRIMARY MALIGNANT NEOPLASM OF LUNG METASTATIC TO OTHER SITE, UNSPECIFIED LATERALITY (HCC): ICD-10-CM

## 2024-10-05 LAB
ALBUMIN SERPL-MCNC: 3.4 G/DL (ref 3.2–4.8)
ALBUMIN SERPL-MCNC: 4.6 G/DL (ref 3.2–4.8)
ALBUMIN/GLOB SERPL: 1.9 {RATIO} (ref 1–2)
ALP LIVER SERPL-CCNC: 129 U/L
ALT SERPL-CCNC: 14 U/L
ANION GAP SERPL CALC-SCNC: 11 MMOL/L (ref 0–18)
ANION GAP SERPL CALC-SCNC: 11 MMOL/L (ref 0–18)
AST SERPL-CCNC: 17 U/L (ref ?–34)
BASE EXCESS BLDA CALC-SCNC: -19 MMOL/L (ref ?–2)
BASE EXCESS BLDV CALC-SCNC: -20.4 MMOL/L
BASOPHILS # BLD AUTO: 0.12 X10(3) UL (ref 0–0.2)
BASOPHILS NFR BLD AUTO: 0.8 %
BILIRUB SERPL-MCNC: 0.3 MG/DL (ref 0.2–1.1)
BILIRUB UR QL STRIP.AUTO: NEGATIVE
BODY TEMPERATURE: 98.6 F
BUN BLD-MCNC: 35 MG/DL (ref 9–23)
BUN BLD-MCNC: 41 MG/DL (ref 9–23)
CA-I BLD-SCNC: 1.34 MMOL/L (ref 0.95–1.32)
CALCIUM BLD-MCNC: 8.8 MG/DL (ref 8.7–10.4)
CALCIUM BLD-MCNC: 9.5 MG/DL (ref 8.7–10.4)
CHLORIDE SERPL-SCNC: 109 MMOL/L (ref 98–112)
CHLORIDE SERPL-SCNC: 111 MMOL/L (ref 98–112)
CLARITY UR REFRACT.AUTO: CLEAR
CO2 SERPL-SCNC: 11 MMOL/L (ref 21–32)
CO2 SERPL-SCNC: 14 MMOL/L (ref 21–32)
COHGB MFR BLD: 0.2 % SAT (ref 0–3)
COLOR UR AUTO: COLORLESS
CREAT BLD-MCNC: 1.72 MG/DL
CREAT BLD-MCNC: 2.27 MG/DL
CREAT UR-SCNC: 41 MG/DL
EGFRCR SERPLBLD CKD-EPI 2021: 21 ML/MIN/1.73M2 (ref 60–?)
EGFRCR SERPLBLD CKD-EPI 2021: 30 ML/MIN/1.73M2 (ref 60–?)
EOSINOPHIL # BLD AUTO: 0.58 X10(3) UL (ref 0–0.7)
EOSINOPHIL NFR BLD AUTO: 4 %
ERYTHROCYTE [DISTWIDTH] IN BLOOD BY AUTOMATED COUNT: 14.3 %
GLOBULIN PLAS-MCNC: 2.4 G/DL (ref 2–3.5)
GLUCOSE BLD-MCNC: 136 MG/DL (ref 70–99)
GLUCOSE BLD-MCNC: 138 MG/DL (ref 70–99)
GLUCOSE UR STRIP.AUTO-MCNC: NORMAL MG/DL
HCO3 BLDA-SCNC: 10.1 MEQ/L (ref 21–27)
HCO3 BLDV-SCNC: 8.3 MEQ/L (ref 22–26)
HCT VFR BLD AUTO: 33.9 %
HGB BLD-MCNC: 10.2 G/DL
HGB BLD-MCNC: 11.6 G/DL
IMM GRANULOCYTES # BLD AUTO: 0.07 X10(3) UL (ref 0–1)
IMM GRANULOCYTES NFR BLD: 0.5 %
KETONES UR STRIP.AUTO-MCNC: NEGATIVE MG/DL
LACTATE BLD-SCNC: 0.4 MMOL/L (ref 0.5–2)
LEUKOCYTE ESTERASE UR QL STRIP.AUTO: NEGATIVE
LIPASE SERPL-CCNC: 20 U/L (ref 12–53)
LYMPHOCYTES # BLD AUTO: 1.07 X10(3) UL (ref 1–4)
LYMPHOCYTES NFR BLD AUTO: 7.3 %
MAGNESIUM SERPL-MCNC: 1.5 MG/DL (ref 1.6–2.6)
MAGNESIUM SERPL-MCNC: 1.8 MG/DL (ref 1.6–2.6)
MCH RBC QN AUTO: 30.4 PG (ref 26–34)
MCHC RBC AUTO-ENTMCNC: 34.2 G/DL (ref 31–37)
MCV RBC AUTO: 89 FL
METHGB MFR BLD: 0 % SAT (ref 0.4–1.5)
MONOCYTES # BLD AUTO: 1.25 X10(3) UL (ref 0.1–1)
MONOCYTES NFR BLD AUTO: 8.6 %
NEUTROPHILS # BLD AUTO: 11.52 X10 (3) UL (ref 1.5–7.7)
NEUTROPHILS # BLD AUTO: 11.52 X10(3) UL (ref 1.5–7.7)
NEUTROPHILS NFR BLD AUTO: 78.8 %
NITRITE UR QL STRIP.AUTO: NEGATIVE
NT-PROBNP SERPL-MCNC: 451 PG/ML (ref ?–450)
OSMOLALITY SERPL CALC.SUM OF ELEC: 288 MOSM/KG (ref 275–295)
OSMOLALITY SERPL CALC.SUM OF ELEC: 288 MOSM/KG (ref 275–295)
OSMOLALITY UR: 306 MOSM/KG (ref 300–1300)
OXYHGB MFR BLDA: 97.3 % (ref 92–100)
OXYHGB MFR BLDV: 69.6 % (ref 72–78)
PCO2 BLDA: 19 MM HG (ref 35–45)
PCO2 BLDV: 28 MM HG (ref 38–50)
PH BLDA: 7.19 [PH] (ref 7.35–7.45)
PH BLDV: 7.08 [PH] (ref 7.33–7.43)
PH UR STRIP.AUTO: 6 [PH] (ref 5–8)
PHOSPHATE SERPL-MCNC: 3.5 MG/DL (ref 2.4–5.1)
PLATELET # BLD AUTO: 360 10(3)UL (ref 150–450)
PO2 BLDA: 88 MM HG (ref 80–100)
PO2 BLDV: 40 MM HG (ref 30–50)
POTASSIUM BLD-SCNC: 3.3 MMOL/L (ref 3.6–5.1)
POTASSIUM SERPL-SCNC: 2.8 MMOL/L (ref 3.5–5.1)
POTASSIUM SERPL-SCNC: 3.6 MMOL/L (ref 3.5–5.1)
PROT SERPL-MCNC: 7 G/DL (ref 5.7–8.2)
PROT UR STRIP.AUTO-MCNC: 30 MG/DL
RBC # BLD AUTO: 3.81 X10(6)UL
SODIUM BLD-SCNC: 132 MMOL/L (ref 135–145)
SODIUM SERPL-SCNC: 133 MMOL/L (ref 136–145)
SODIUM SERPL-SCNC: 134 MMOL/L (ref 136–145)
SODIUM SERPL-SCNC: 70 MMOL/L
SP GR UR STRIP.AUTO: 1.01 (ref 1–1.03)
TROPONIN I SERPL HS-MCNC: 5 NG/L
TSI SER-ACNC: 1.99 MIU/ML (ref 0.55–4.78)
UROBILINOGEN UR STRIP.AUTO-MCNC: NORMAL MG/DL
UUN UR-MCNC: 288 MG/DL
WBC # BLD AUTO: 14.6 X10(3) UL (ref 4–11)

## 2024-10-05 PROCEDURE — 71250 CT THORAX DX C-: CPT | Performed by: EMERGENCY MEDICINE

## 2024-10-05 PROCEDURE — 74176 CT ABD & PELVIS W/O CONTRAST: CPT | Performed by: EMERGENCY MEDICINE

## 2024-10-05 PROCEDURE — 71045 X-RAY EXAM CHEST 1 VIEW: CPT | Performed by: EMERGENCY MEDICINE

## 2024-10-05 PROCEDURE — 99291 CRITICAL CARE FIRST HOUR: CPT | Performed by: EMERGENCY MEDICINE

## 2024-10-05 RX ORDER — POLYETHYLENE GLYCOL 3350 17 G/17G
17 POWDER, FOR SOLUTION ORAL DAILY PRN
Status: DISCONTINUED | OUTPATIENT
Start: 2024-10-05 | End: 2024-10-08

## 2024-10-05 RX ORDER — SODIUM PHOSPHATE, DIBASIC AND SODIUM PHOSPHATE, MONOBASIC 7; 19 G/230ML; G/230ML
1 ENEMA RECTAL ONCE AS NEEDED
Status: DISCONTINUED | OUTPATIENT
Start: 2024-10-05 | End: 2024-10-08

## 2024-10-05 RX ORDER — ASPIRIN 81 MG/1
81 TABLET, CHEWABLE ORAL DAILY
Status: DISCONTINUED | OUTPATIENT
Start: 2024-10-05 | End: 2024-10-08

## 2024-10-05 RX ORDER — HYDROXYZINE HYDROCHLORIDE 25 MG/1
25 TABLET, FILM COATED ORAL 3 TIMES DAILY PRN
Status: DISCONTINUED | OUTPATIENT
Start: 2024-10-05 | End: 2024-10-08

## 2024-10-05 RX ORDER — MELATONIN
3 NIGHTLY PRN
Status: DISCONTINUED | OUTPATIENT
Start: 2024-10-05 | End: 2024-10-08

## 2024-10-05 RX ORDER — BISACODYL 10 MG
10 SUPPOSITORY, RECTAL RECTAL
Status: DISCONTINUED | OUTPATIENT
Start: 2024-10-05 | End: 2024-10-08

## 2024-10-05 RX ORDER — SENNOSIDES 8.6 MG
17.2 TABLET ORAL NIGHTLY PRN
Status: DISCONTINUED | OUTPATIENT
Start: 2024-10-05 | End: 2024-10-08

## 2024-10-05 RX ORDER — ALBUTEROL SULFATE 90 UG/1
1 INHALANT RESPIRATORY (INHALATION) EVERY 6 HOURS PRN
Status: DISCONTINUED | OUTPATIENT
Start: 2024-10-05 | End: 2024-10-08

## 2024-10-05 RX ORDER — ACETAMINOPHEN 500 MG
500 TABLET ORAL EVERY 4 HOURS PRN
Status: DISCONTINUED | OUTPATIENT
Start: 2024-10-05 | End: 2024-10-08

## 2024-10-05 RX ORDER — ONDANSETRON 2 MG/ML
4 INJECTION INTRAMUSCULAR; INTRAVENOUS EVERY 6 HOURS PRN
Status: DISCONTINUED | OUTPATIENT
Start: 2024-10-05 | End: 2024-10-08

## 2024-10-05 RX ORDER — MAGNESIUM OXIDE 400 MG/1
800 TABLET ORAL ONCE
Status: COMPLETED | OUTPATIENT
Start: 2024-10-05 | End: 2024-10-05

## 2024-10-05 RX ORDER — TRAMADOL HYDROCHLORIDE 50 MG/1
50 TABLET ORAL EVERY 12 HOURS PRN
Status: DISCONTINUED | OUTPATIENT
Start: 2024-10-05 | End: 2024-10-08

## 2024-10-05 RX ORDER — METOCLOPRAMIDE HYDROCHLORIDE 5 MG/ML
5 INJECTION INTRAMUSCULAR; INTRAVENOUS EVERY 8 HOURS PRN
Status: DISCONTINUED | OUTPATIENT
Start: 2024-10-05 | End: 2024-10-08

## 2024-10-05 RX ORDER — MAGNESIUM OXIDE 400 MG/1
400 TABLET ORAL ONCE
Status: DISCONTINUED | OUTPATIENT
Start: 2024-10-05 | End: 2024-10-08

## 2024-10-05 RX ORDER — POTASSIUM CHLORIDE 1500 MG/1
40 TABLET, EXTENDED RELEASE ORAL EVERY 4 HOURS
Status: COMPLETED | OUTPATIENT
Start: 2024-10-05 | End: 2024-10-06

## 2024-10-05 RX ORDER — ROSUVASTATIN CALCIUM 5 MG/1
5 TABLET, COATED ORAL NIGHTLY
Status: DISCONTINUED | OUTPATIENT
Start: 2024-10-05 | End: 2024-10-08

## 2024-10-05 NOTE — ED PROVIDER NOTES
Patient Seen in: [facility] Emergency Department      History     Chief Complaint   Patient presents with    Dehydration     Daughter thinks she is dehydrated, not eating losing weight. Getting treatment for lung cancer diagnosed in January      Stated Complaint:     Subjective:   HPI  Patient is a 79 yo F with a history of metastatic lung cancer on immunotherapy, PE on eliquis, COPD, HTN, HLD, GERD who presents to ED for evaluation of vomiting and weight loss. Patient is accompanied by her daughter. Patient reports that over the past few weeks she has been very fatigued with generalized weakness. She has had nausea and occasional episodes of NBNB emesis. Reports mild intermittent RUQ and epigastric discomfort. Pt reports constipation with last BM yesterday. Patient states she has had back pain across her thoracic region for awhile that sometimes makes her feel SOB. Denies any fevers, cough, urinary symptoms, leg swelling, hemoptysis. Patient has been compliant with her eliquis. Daughter is concerned that patient is dehydrated and was looking at possible side effects of her immunotherapy treatment on artemide which include hyperglycemia and patient has been experiencing increased thirst.       Objective:     Past Medical History:    AAA (abdominal aortic aneurysm) (HCC)    3x3.4 cm, discussed with pt 9/3/20, recheck yearly, quit tob in 2011, treat risk factors.     Abnormal echocardiogram    possible pulm htn, went to [facility] for eval 12/19/17 and w/u was negative, note received 8/29/18    Abnormal stress echo    milan Bellamy 7/15/21, 12/13/21    Agatston coronary artery calcium score less than 100    72 at [facility], sees Dr. Bellamy, last visit 7/15/21    Atherosclerosis of abdominal aorta (HCC)    at [facility]    Cancer (HCC)    COPD (chronic obstructive pulmonary disease) (HCC)    on cardiopulmonary stress test Dr. Bellamy    Diastolic dysfunction    going to [facility], started on furosemide    Eczema    saw derm 10/13     Elevated liver enzymes    presumed to be due to lipitor    Encounter for eye exam    [facility], overdue     Esophageal reflux    meds prn    Ex-cigarette smoker    Heart failure with preserved ejection fraction (HCC)    dx at [place], lasix added    High blood pressure    High cholesterol    Dr. Bellamy managing    History of cardiovascular stress test    obstructive pulmonary component on cardiopulmonary stress test at [facility] ordered by Dr. Bellamy     History of Doppler echocardiogram    at [facility], Dr. Bellamy    History of normal resting EKG    at [facility]    History of nuclear stress test    neg at [place], EF 57%    Hypercalcemia    Iliac artery occlusion, right (HCC)    Dr. Cantu. had lithotripsy and stent, last US 21 at [facility]: aortic atherosclerosis, patent R iliac stent    Left atrial enlargement    Dr. Sonja Roe at [facility], EF 77%    Living will in place    Osteopenia    improved , further improvement , d/c med after 5 years, recheck     PFO (patent foramen ovale) ([facility])    dx at [place], felt not to be clinically signicant. no tx needed    Pulmonary nodule, left    on Ct at [place], also with mildly enlarged nodes    PVD (peripheral vascular disease) ([facility])    saw Dr. Canut, stent R iliac on 10/6/20, claudication on R side. , f/u 21    Refused influenza vaccine    she's considering for     Screening for cardiovascular condition    normal CGXT with EF of 70%, see below for abn stress echo    Sinus bradycardia    with LAE              Past Surgical History:   Procedure Laterality Date    Colonoscopy N/A 2015    Procedure: COLONOSCOPY;  Surgeon: Garrett Kim MD;  Location:  ENDOSCOPY          x3 with anesthesia    Stent place iliac art o/p ini  10/06/2020    RAntolin Cantu                Social History     Socioeconomic History    Marital status:     Number of children: 3   Occupational History    Occupation: retired    Tobacco Use    Smoking status: Former     Current packs/day: 0.00      Average packs/day: 0.3 packs/day for 40.0 years (12.0 ttl pk-yrs)     Types: Cigarettes     Start date: 1971     Quit date: 2011     Years since quittin.2    Smokeless tobacco: Never   Vaping Use    Vaping status: Never Used   Substance and Sexual Activity    Alcohol use: Yes     Alcohol/week: 0.0 - 7.0 standard drinks of alcohol     Comment: 1 glass of wine daily    Drug use: No    Sexual activity: Not Currently     Partners: Male   Other Topics Concern     Service No    Blood Transfusions No    Caffeine Concern No    Occupational Exposure No    Hobby Hazards No    Sleep Concern Yes     Comment: trouble sleeping, poss due to anxiety    Stress Concern Yes     Comment: occassionally    Weight Concern No    Special Diet No    Back Care No    Exercise Yes     Comment: very active at home, goes to fitness center, walking    Seat Belt Yes    Self-Exams Yes     Social Drivers of Health     Financial Resource Strain: Low Risk  (2024)    [facility]    Overall Financial Resource Strain (CARDIA)     Difficulty of Paying Living Expenses: Not hard at all   Food Insecurity: No Food Insecurity (10/5/2024)    Food Insecurity     Food Insecurity: Never true   Transportation Needs: No Transportation Needs (10/5/2024)    Transportation Needs     Lack of Transportation: No   Physical Activity: Medium Risk (2024)    Received from [facility]    Exercise Vital Sign     On average, how many days per week do you engage in moderate to strenuous exercise (like a brisk walk)?: 4 days     On average, how many minutes do you engage in exercise at this level?: 30 min   Stress: No Stress Concern Present (2024)    Received from [facility], [facility] of Occupational Health - Occupational Stress Questionnaire     Feeling of Stress : Not at all   Housing Stability: Low Risk   (10/5/2024)    Housing Stability     Housing Instability: No                  Physical Exam     ED Triage Vitals [10/05/24 0905]   BP 95/56   Pulse 97   Resp 18   Temp 97.1 °F (36.2 °C)   Temp src Temporal   SpO2 99 %   O2 Device Nasal cannula       Current Vitals:   No data recorded      Physical Exam  Vitals and nursing note reviewed.   Constitutional:       General: She is not in acute distress.     Appearance: She is not ill-appearing.   HENT:      Head: Normocephalic and atraumatic.      Mouth/Throat:      Mouth: Mucous membranes are dry.   Eyes:      Extraocular Movements: Extraocular movements intact.      Pupils: Pupils are equal, round, and reactive to light.   Cardiovascular:      Rate and Rhythm: Normal rate and regular rhythm.   Pulmonary:      Effort: Pulmonary effort is normal.   Abdominal:      General: There is no distension.      Palpations: Abdomen is soft.      Tenderness: There is abdominal tenderness.      Comments: Epigastric TTP   Musculoskeletal:      Cervical back: Neck supple.      Right lower leg: No edema.      Left lower leg: No edema.   Skin:     General: Skin is warm and dry.      Capillary Refill: Capillary refill takes less than 2 seconds.   Neurological:      General: No focal deficit present.      Mental Status: She is alert.   Psychiatric:         Mood and Affect: Mood normal.           ED Course     Labs Reviewed   COMP METABOLIC PANEL (14) - Abnormal; Notable for the following components:       Result Value    Glucose 136 (*)     Sodium 133 (*)     CO2 11.0 (*)     BUN 41 (*)     Creatinine 2.27 (*)     eGFR-Cr 21 (*)     All other components within normal limits   CBC WITH DIFFERENTIAL WITH PLATELET - Abnormal; Notable for the following components:    WBC 14.6 (*)     HGB 11.6 (*)     HCT 33.9 (*)     Neutrophil Absolute Prelim 11.52 (*)     Neutrophil Absolute 11.52 (*)     Monocyte Absolute 1.25 (*)     All other components within normal limits   URINALYSIS WITH CULTURE  REFLEX - Abnormal; Notable for the following components:    Urine Color Colorless (*)     Blood Urine Trace (*)     Protein Urine 30 (*)     Squamous Epi. Cells Few (*)     All other components within normal limits   PRO BETA NATRIURETIC PEPTIDE - Abnormal; Notable for the following components:    Pro-Beta Natriuretic Peptide 451 (*)     All other components within normal limits   VENOUS BLOOD GAS - Abnormal; Notable for the following components:    Venous pH 7.08 (*)     Venous pCO2 28 (*)     Venous HCO3 8.3 (*)     Venous O2Hb 69.6 (*)     All other components within normal limits   ABG PANEL W ELECT AND LACTATE - Abnormal; Notable for the following components:    ABG pH 7.19 (*)     ABG pCO2 19 (*)     ABG HCO3 10.1 (*)     ABG Base Excess -19.0 (*)     Total Hemoglobin 10.2 (*)     Methemoglobin 0.0 (*)     Ionized Calcium 1.34 (*)     Potassium Blood Gas 3.3 (*)     Sodium Blood Gas 132 (*)     Lactic Acid (Blood Gas) 0.4 (*)     All other components within normal limits   RENAL FUNCTION PANEL - Abnormal; Notable for the following components:    Glucose 138 (*)     Sodium 134 (*)     Potassium 2.8 (*)     CO2 14.0 (*)     BUN 35 (*)     Creatinine 1.72 (*)     eGFR-Cr 30 (*)     All other components within normal limits   MAGNESIUM - Abnormal; Notable for the following components:    Magnesium 1.5 (*)     All other components within normal limits   RENAL FUNCTION PANEL - Abnormal; Notable for the following components:    Glucose 141 (*)     Potassium 3.0 (*)     CO2 18.0 (*)     BUN 31 (*)     Creatinine 1.47 (*)     Calcium, Total 8.5 (*)     eGFR-Cr 36 (*)     All other components within normal limits   MAGNESIUM - Abnormal; Notable for the following components:    Magnesium 1.5 (*)     All other components within normal limits   COMP METABOLIC PANEL (14) - Abnormal; Notable for the following components:    Glucose 141 (*)     Potassium 3.0 (*)     CO2 18.0 (*)     BUN 31 (*)     Creatinine 1.47 (*)      Calcium, Total 8.5 (*)     eGFR-Cr 36 (*)     Total Protein 5.4 (*)     Globulin  1.9 (*)     All other components within normal limits   CBC WITH DIFFERENTIAL WITH PLATELET - Abnormal; Notable for the following components:    RBC 3.00 (*)     HGB 9.4 (*)     HCT 26.0 (*)     Lymphocyte Absolute 0.71 (*)     All other components within normal limits   POTASSIUM - Abnormal; Notable for the following components:    Potassium 3.4 (*)     All other components within normal limits   RENAL FUNCTION PANEL - Abnormal; Notable for the following components:    Glucose 124 (*)     Sodium 134 (*)     Potassium 3.4 (*)     Creatinine 1.07 (*)     Calcium, Total 8.1 (*)     eGFR-Cr 53 (*)     Phosphorus 1.7 (*)     All other components within normal limits   PHOSPHORUS - Abnormal; Notable for the following components:    Phosphorus 1.7 (*)     All other components within normal limits   CBC WITH DIFFERENTIAL WITH PLATELET - Abnormal; Notable for the following components:    RBC 2.83 (*)     HGB 8.7 (*)     HCT 24.2 (*)     Lymphocyte Absolute 0.75 (*)     All other components within normal limits   POTASSIUM - Abnormal; Notable for the following components:    Potassium 3.4 (*)     All other components within normal limits   RENAL FUNCTION PANEL - Abnormal; Notable for the following components:    Glucose 115 (*)     Sodium 133 (*)     Potassium 3.4 (*)     Calcium, Total 8.2 (*)     Albumin 3.1 (*)     All other components within normal limits   MAGNESIUM - Abnormal; Notable for the following components:    Magnesium 1.4 (*)     All other components within normal limits   CBC WITH DIFFERENTIAL WITH PLATELET - Abnormal; Notable for the following components:    RBC 2.67 (*)     HGB 8.1 (*)     HCT 23.9 (*)     Lymphocyte Absolute 0.72 (*)     All other components within normal limits   LIPASE - Normal   TROPONIN I HIGH SENSITIVITY - Normal   MAGNESIUM - Normal   TSH W REFLEX TO FREE T4 - Normal   OSMOLALITY, URINE - Normal    MAGNESIUM - Normal   POTASSIUM - Normal   PHOSPHORUS - Normal   BETA HYDROXYBUTYRATE   CREATININE, URINE, RANDOM   SODIUM, URINE, RANDOM   UREA NITROGEN, U   RAINBOW DRAW BLUE       ED Course as of 10/10/24 2139  ------------------------------------------------------------  Time: 10/05 1229  Comment: CONCLUSION:       1. Masslike consolidative opacity peripherally within the right upper lobe (4.1 x 2.3 x 2.6 cm).  Given history of lung cancer, this could represent residual/recurrent malignancy, treatment related changes, or a combination there of.      2. There are multiple additional solid nodular opacities with irregular/spiculated margin throughout both lungs.  These are suspicious for multifocal metastases, differential including infectious/inflammatory process.      3. Mild concentric thickening of the mid/distal esophagus.  This may represent a component of mild esophagitis.      4. Mild hydronephrosis of the right collecting system.  Relative caliber transition noted at the UPJ.  No obstructing lesion or calculus identified.      5. Nodular thickening of the right adrenal gland, not appreciated in 2011. This remains nonspecific.          Independent interpretation of imaging : CXR and noted nodular opacity in R upper lobe        EKG shows NSR with ventricular rate of 68, no acute ST changes, similar to prior.      MDM      Patient is a 79 yo F with a history of metastatic lung cancer on, PE on eliquis, COPD, HTN, HLD, GERD who presents to ED for evaluation of vomiting and weight loss. Patient is afebrile, HDS, satting well on RA. On exam patient is nontoxic appearing. She has mild epigastric TTP. Neg Putnam's, McBurney's. Plan for broad work up with labs, CXR, UA, CT abd/pelvis. Will give IVF and re-evaluate. Pt declines zofran.     ED Course:   Labs significant for leukocytosis of 14.6, suspect most likely reactive as pt has no source of infection or fever. Hgb stable at 11.6. CMP significant for bicarb of  11, PRINCESS with creatinine of 2.27, sodium 133, glucose 136. Lipase wnl. Trop neg, BNP mildly elevated. Mg wnl.     After CMP resulted, VBG was obtained which showed pH of 7.08, bicarb 8.3, co2 of 28. Lactate wnl. UA showed no UTI, no ketones.    CT chest/abd/pelvis showed mass RUL lung mass with metastases, mild esophageal thickening and mild R hydronephrosis with no obstructing lesion or calculus. Urology consulted who will evaluate pt, no other recs at this time.     Discussed with nephro who recommended ABG which showed pH of 7.19, bicarb of 10.1, co2 of 19. Lactate normal BHB sent. Nephro recommended bicarb drip and ordered additional urine tests. Intensivist evaluated patient in the ED. Patient ok to go to floor given ABG shows improving acidosis.  Suspect most likely PRINCESS with non anion gap metabolic acidosis 2/2 decreased PO intake, NSAID use, ACE inhibitor use. Pt admitted to [facility] who was comfortable with plan.     Records from previous encounters were reviewed from : clinic note 9/17/2024 and It was noted that pt currently on artemide trial, pt having loose stool at that time  Differential diagnosis included : infection, dehydration, electrolyte abnormality, obstruction, ACS, DKA, starvation ketoacidosis, medication side effect, metabolic abnormality    Management of patient was discussed with : hospitalist, urologist, nephrologist, intensivist, and patient daughter      Hospitalization was considered : yes  Admission disposition: 10/5/2024  2:56 PM         ED Course:   A total of 50 minutes of critical care time (exclusive of billable procedures) was administered to manage the patient's critical lab values due to her significant metabolic acidosis.  This involved direct patient intervention, complex decision making, and/or extensive discussions with the patient, family, and clinical staff.      Medical Decision Making      Disposition and Plan     Clinical Impression:  1. Metabolic acidosis    2.  PRINCESS (acute kidney injury) (HCC)    3. Primary malignant neoplasm of lung metastatic to other site, unspecified laterality (HCC)    4. Essential hypertension    5. Healthcare maintenance         Disposition:  Admit  10/5/2024  2:56 pm    Follow-up:  Allan Hatch MD  [address] [facility]  [address] [place] [id number]  [phone]    Follow up  in 2-3 weeks, have renal US prior to office visit.    Guerrero Naik MD  [address] [place] [id number]  [phone]    Follow up in 1 week(s)  needs follow up labs for kidney function/electrolyte check          Medications Prescribed:  Discharge Medication List as of 10/8/2024  3:19 PM        START taking these medications    Details   hydrOXYzine 25 MG Oral Tab Take 1 tablet (25 mg total) by mouth 3 (three) times daily as needed for Itching., Normal, Disp-20 tablet, R-0                 Supplementary Documentation:         Hospital Problems       Present on Admission  Date Reviewed: 9/8/2021            ICD-10-CM Noted POA    * (Principal) Metabolic acidosis E87.20 10/5/2024 Unknown    PRINCESS (acute kidney injury) (HCC) N17.9 10/5/2024 Unknown    Primary malignant neoplasm of lung metastatic to other site, unspecified laterality (HCC) C34.90 10/5/2024 Unknown

## 2024-10-05 NOTE — CONSULTS
Critical Care Consultation - [facility]        HPI: Mojgan Jefferson is a 78 year old female with a history of metastatic cancer stage IV on immunotherapy PE on Eliquis COPD hypertension treated with Lasix and lisinopril dyslipidemia GERD who presents to the emergency department with the discretion of the daughter with weight loss nausea vomiting and overall failure to thrive.  She was mildly hypotensive in the ER normal lactate no urinary ketones but had acute kidney injury as well as a metabolic acidosis with a bicarb 11.  Initial venous blood gas was concerning subsequently arterial blood gas went up to 7.19.  I was called by the ER at the request of the hospitalist as well as the nephrologist for assessment.  When I saw the patient she was well-appearing.  I spoke to her at length for approximately 30 minutes in the presence of the daughter as well.  Patient denies any current diarrhea but has had diarrhea in the last few weeks has had less than half a meal per day for the last 3 weeks for \"not feeling hungry\".  No abdominal pain no syncope patient continues to take nonsteroidal anti-inflammatories along with her Lasix and lisinopril.            Past Medical History:  Past Medical History:    AAA (abdominal aortic aneurysm) (HCC)    3x3.4 cm, discussed with pt 9/3/20, recheck yearly, quit tob in 2011, treat risk factors.     Abnormal echocardiogram    possible pulm htn, went to [place] for eval 12/19/17 and w/u was negative, note received 8/29/18    Abnormal stress echo    milan Bellamy 7/15/21, 12/13/21    Agatston coronary artery calcium score less than 100    72 at [place], sees Dr. Bellamy, last visit 7/15/21    Atherosclerosis of abdominal aorta (HCC)    at [facility]    Cancer (HCC)    COPD (chronic obstructive pulmonary disease) (HCC)    on cardiopulmonary stress test Dr. Bellamy    Diastolic dysfunction    going to [place], started on furosemide    Eczema    saw derm 10/13    Elevated liver enzymes    presumed to be  due to lipitor    Encounter for eye exam    [employer], overdue     Esophageal reflux    meds prn    Ex-cigarette smoker    Heart failure with preserved ejection fraction (HCC)    dx at [place], lasix added    High blood pressure    High cholesterol    Dr. Bellamy managing    History of cardiovascular stress test    obstructive pulmonary component on cardiopulmonary stress test at [facility] ordered by Dr. Bellamy     History of Doppler echocardiogram    at [facility], Dr. Bellamy    History of normal resting EKG    at [facility]    History of nuclear stress test    neg at [place], EF 57%    Hypercalcemia    Iliac artery occlusion, right ([facility])    Dr. Cantu. had lithotripsy and stent, last US 21 at [facility]: aortic atherosclerosis, patent R iliac stent    Left atrial enlargement    Dr. Sonja Roe at [facility], EF 77%    Living will in place    Osteopenia    improved , further improvement , d/c med after 5 years, recheck     PFO (patent foramen ovale) ([facility])    dx at [place], felt not to be clinically signicant. no tx needed    Pulmonary nodule, left    on Ct at [place], also with mildly enlarged nodes    PVD (peripheral vascular disease) ([facility])    saw Dr. Cantu, stent R iliac on 10/6/20, claudication on R side. , f/u 21    Refused influenza vaccine    she's considering for     Screening for cardiovascular condition    normal CGXT with EF of 70%, see below for abn stress echo    Sinus bradycardia    with LAE     Past Surgical History:   Past Surgical History:   Procedure Laterality Date    Colonoscopy N/A 2015    Procedure: COLONOSCOPY;  Surgeon: Garrett Kim MD;  Location:  ENDOSCOPY          x3 with anesthesia    Stent place iliac art o/p ini  10/06/2020    KARENA Cantu      Family History:   Family History   Problem Relation Age of Onset    Heart Disorder Father     Heart Disorder Son         possible heart prob    Other (Other) Son         transverse myelitis, psoriasis    Hypertension Sister     Hypertension Brother      Heart Disorder Brother 76        MI    Hypertension Brother     Hypertension Brother     Hypertension Sister     No Known Problems Daughter     No Known Problems Daughter     No Known Problems Brother     No Known Problems Brother     Breast Cancer Cousin 58        M-cousin ago of dx 58     Social History:    reports that she quit smoking about 13 years ago. Her smoking use included cigarettes. She started smoking about 53 years ago. She has a 12 pack-year smoking history. She has never used smokeless tobacco. She reports current alcohol use. She reports that she does not use drugs.     HOME MEDICATIONS      Prior to Admission Medications   Prescriptions Last Dose Informant Patient Reported? Taking?   ASPIRIN 81 MG OR TABS   Yes No   Si TABLET DAILY   Albuterol Sulfate  (90 Base) MCG/ACT Inhalation Aero Soln   No No   Si puffs 15 minutes before exercise.   Calcium Carbonate-Vitamin D (CALTRATE 600+D OR)   Yes No   Calcium Polycarbophil (FIBER) 625 MG Oral Tab   Yes No   Sig: Take by mouth.   FISH OIL 1000 MG OR CAPS   Yes No   Si daily   Garlic (GNP GARLIC EXTRACT) 400 MG Oral Tab   Yes No   OMEPRAZOLE 20 MG Oral Capsule Delayed Release   No No   Sig: TAKE 1 CAPSULE(20 MG) BY MOUTH DAILY   Patient taking differently: Take 1 capsule (20 mg total) by mouth as needed.   Rosuvastatin Calcium 20 MG Oral Tab   Yes No   VITAMIN B COMPLEX IJ   Yes No   Si Q DAY   apixaban 5 MG Oral Tab   No No   Sig: Take 2 tabs (10mg) by mouth twice daily for 7 days, then take 1 tab (5mg) by mouth twice daily thereafter.   furosemide 20 MG Oral Tab   No No   Sig: Take 1 tablet (20 mg total) by mouth once daily.   Patient taking differently: Take 1 tablet (20 mg total) by mouth every other day. Monday, Wednesday, and Friday   lisinopril 20 MG Oral Tab   No No   Sig: Take 1 tablet (20 mg total) by mouth daily.   Patient taking differently: Take 0.5 tablets (10 mg total) by mouth daily.   metoprolol succinate 50 MG Oral  Tablet 24 Hr   No No   Sig: Take 1 tablet (50 mg total) by mouth daily.      Facility-Administered Medications: None       CURRENT MEDICATIONS          PRN meds:      Infusions:   sodium bicarbonate in D5W infusion 150 mEq (10/05/24 1418)         ALLERGIES      No Known Allergies      REVIEW OF SYSTEMS      10 pt ROS negative except what is mentioned in HPI    OBJECTIVE      Vitals:    10/05/24 1445 10/05/24 1446 10/05/24 1500 10/05/24 1515   BP: 122/54 116/55 105/59 105/59   Pulse: 70 68 70 75   Resp: 21 14 17 19   Temp:       TempSrc:       SpO2: 100% 96% 100% 99%                Gen - Alert, oriented x 3, in no apparent distress dry mucous membranes  HEENT - head normocephalic, atraumatic. Eyes-no scleral icterus or injection              - Mouth - moist mucus membranes, no oral lesions  Neck - supple, no palpable lymphadenopathy.   Lungs - equal breath sounds bilaterally. No wheezing, crackles, rhonchi  CV - regular rate & rhythm. Normal S1, S2. No murmurs, rubs, or gallops appreciated.  Abdomen - soft, nontender to palpation. No organomegaly appreciated. Normal bowel sounds.  Extremities - No cyanosis, clubbing, edema appreciated.      Labs:    Recent Labs   Lab 10/05/24  0916   WBC 14.6*   HGB 11.6*   .0     Recent Labs   Lab 10/05/24  0916   *   K 3.6      CO2 11.0*   BUN 41*   CREATSERUM 2.27*   *   ANIONGAP 11   ALB 4.6   CA 9.5   MG 1.8   ALKPHO 129   AST 17   ALT 14   BILT 0.3   TP 7.0   LIP 20     No results for input(s): \"PGLU\" in the last 168 hours.  No results for input(s): \"PT\", \"INR\" in the last 168 hours.  Recent Labs   Lab 10/05/24  0916   PBNP 451*   TROPHS 5     No results for input(s): \"BRAULIO\", \"LDH\", \"DDIMER\", \"CRP\", \"ESRML\", \"PCT\", \"NH3\" in the last 168 hours.  Recent Labs   Lab 10/05/24  1251   ABGPHT 7.19*   YWHBSA3U 19*   AQQJD2U 88   ABGHCO3 10.1*   ABGBE -19.0*     No results for input(s): \"IPH\", \"IPCO2\", \"IPO2\", \"ITCO2\", \"IHCO3\", \"ISO2\", \"GERARD\", \"IBGD\", \"IFIO2\" in  the last 168 hours.  No results for input(s): \"COVID19\", \"INFAPCR\", \"INFBPCR\", \"RSV\", \"STREPPNEUMAG\", \"LEGIONAG\" in the last 168 hours.    Urinalysis:  Recent Labs   Lab 10/05/24  1041   COLORUR Colorless*   CLARITY Clear   SPECGRAVITY 1.009   PHURINE 6.0   PROUR 30*   KETUR Negative   GLUUR Normal   BILUR Negative   BLOODURINE Trace*   NITRITE Negative   UROBILINOGEN Normal   LEUUR Negative   EPIUR Few*   WBCUR 1-5   BACUR None Seen        Recent Labs   Lab 10/05/24  1251   ABGPHT 7.19*   QKICQK7I 19*   PEKUX3X 88   ABGHCO3 10.1*   ABGBE -19.0*   TEMP 98.6   PRITI Not Applicable   SITE Right Brachial   DEV Room Air   THGB 10.2*         Imaging reviewed.    ASSESSMENT AND PLAN   78-year-old female stage IV lung cancer on immunotherapy/clinical trial diastolic heart failure?  COPD PE on anticoagulation GERD with significant metabolic acidosis  Neurologic no encephalopathy or delirium patient appears quite appropriate  Pulmonary no signs of respiratory failure patient has stage IV lung cancer CAT scan does show sequelae of malignancy  Cardiovascular hypertension mild likely secondary dehydration without lactic acidosis received 1 L normal saline now on bicarbonate drip.  Bedside echo with preserved EF IVC very flat likely volume responsive  Elevated like the beta natruretic peptide secondary to renal failure likely myocardial related based on my bedside echo  GI decreased p.o. intake nausea vomiting although not current likely secondary to anorexia of malignancy versus acute kidney injury related exacerbation  Renal acute kidney injury with non-anion gap metabolic acidosis probably combination of ACE inhibitor use Lasix use decreased p.o. intake and nonsteroidal anti-inflammatory use nephrology following currently on bicarbonate drip agree with plan.  No hyperkalemia.  Acidosis is improving suspect with bicarbonate drip should continue.  Negative lactate negative urinary ketones beta hydroxy pending urine electrolytes  pending  #6 infectious disease no fever mild leukocytosis does not appear to have any significant signs of infection.  Given her immunosuppressed status low threshold for antibiotics with blood culture surveillance  #7 hematology anemia likely of chronic disease no significant thrombocytopenia no coagulopathy.  #8 endocrine no history of endocrinopathies check TSH  I spoke to the emergency department physician I spoke to the family for proximately 30 minutes I also spoke to the nephrologist via telephone.  At this point the patient has had significant improvement with respect to her lethargy to the point where the patient was in full conversation with me temporarily better.  Bicarbonate drip appears to be the right therapeutic choice if the patient becomes persistently hypotensive please [place] the [place] care [place] at [phone] otherwise expectant management with serial labs per nephrology.  45 minutes critical care time spent with this patient with respect to management of acute kidney injury mild hypotension date of service 10/5/2024    Caesar James MD

## 2024-10-05 NOTE — PROGRESS NOTES
NURSING ADMISSION NOTE      Patient admitted via Cart  Oriented to room.  Safety precautions initiated.  Bed in low position.  Call light in reach.    Tele, R/A. . Up sba. Regular diet. Bicarb gtt@125ml/hr. Nephrology and uro on. Plan of care ongoing.

## 2024-10-05 NOTE — CONSULTS
[facility]   part of [facility]    Report of Consultation    Mojgan Jefferson Patient Status:  Inpatient    1945 MRN [id number]   Location [facility] 3NE-A Attending Tamara, Latoya Sebastian*   Hosp Day # 0 PCP Guerrero Naik MD     Date of Admission:  10/5/2024  Date of Consult:  10/6/2024  Reason for Consultation:   Mild left hydronephrosis    History of Present Illness:   Patient is a 78 year old female who was admitted to the hospital for Metabolic acidosis:    78 year old woman with metastatic lung cancer, currently on immunotherapy who presented with weakness.    UA on admission did not suggest a UTI.    Found to have metabolic acidosis and PRINCESS upon presentation.. Nephrology is following.    We were consulted by nephrology, because a CT A&P upon admission showed mild right hydronephrosis to the level of the UPJ. No obstructing stones or lesions appreciated.     She does note some occasional right back/flank pain.  Mild and intermittent in nature.     Denies prior history of nephrolithiasis.    Since admission, her creatinine has downtrended from 2.27 on admission to 1.47 this AM. UOP not recorded.     Past Medical History  Past Medical History:    AAA (abdominal aortic aneurysm) (HCC)    3x3.4 cm, discussed with pt 9/3/20, recheck yearly, quit tob in , treat risk factors.     Abnormal echocardiogram    possible pulm htn, went to [place] for eval 15 and w/u was negative, note received 18    Abnormal stress echo    milan Bellamy 7/15/21, 21    Agatston coronary artery calcium score less than 100    72 at [place], sees Dr. Bellmay, last visit 7/15/21    Atherosclerosis of abdominal aorta (HCC)    at [facility]    Cancer (HCC)    COPD (chronic obstructive pulmonary disease) (HCC)    on cardiopulmonary stress test Dr. Bellamy    Diastolic dysfunction    going to [place], started on furosemide    Eczema    saw derm 10/13    Elevated liver enzymes    presumed to be due to lipitor     Encounter for eye exam    [employer], overdue     Esophageal reflux    meds prn    Ex-cigarette smoker    Heart failure with preserved ejection fraction (HCC)    dx at [place], lasix added    High blood pressure    High cholesterol    Dr. Bellamy managing    History of cardiovascular stress test    obstructive pulmonary component on cardiopulmonary stress test at [facility] ordered by Dr. Bellamy     History of Doppler echocardiogram    at [facility], Dr. Bellamy    History of normal resting EKG    at [facility]    History of nuclear stress test    neg at [place], EF 57%    Hypercalcemia    Iliac artery occlusion, right ([facility])    Dr. Cantu. had lithotripsy and stent, last US 21 at [facility]: aortic atherosclerosis, patent R iliac stent    Left atrial enlargement    Dr. Sonja Roe at [facility], EF 77%    Living will in place    Osteopenia    improved , further improvement , d/c med after 5 years, recheck     PFO (patent foramen ovale) ([facility])    dx at [place], felt not to be clinically signicant. no tx needed    Pulmonary nodule, left    on Ct at [place], also with mildly enlarged nodes    PVD (peripheral vascular disease) ([facility])    saw Dr. Cantu, stent R iliac on 10/6/20, claudication on R side. , f/u 21    Refused influenza vaccine    she's considering for     Screening for cardiovascular condition    normal CGXT with EF of 70%, see below for abn stress echo    Sinus bradycardia    with LAE       Past Surgical History  Past Surgical History:   Procedure Laterality Date    Colonoscopy N/A 2015    Procedure: COLONOSCOPY;  Surgeon: Garrett Kim MD;  Location:  ENDOSCOPY          x3 with anesthesia    Stent place iliac art o/p ini  10/06/2020    KARENA Cantu       Family History  Family History   Problem Relation Age of Onset    Heart Disorder Father     Heart Disorder Son         possible heart prob    Other (Other) Son         transverse myelitis, psoriasis    Hypertension Sister     Hypertension Brother     Heart Disorder  Brother 76        MI    Hypertension Brother     Hypertension Brother     Hypertension Sister     No Known Problems Daughter     No Known Problems Daughter     No Known Problems Brother     No Known Problems Brother     Breast Cancer Cousin 58        M-cousin ago of dx 58       Social History  Social History     Socioeconomic History    Marital status:     Number of children: 3   Occupational History    Occupation: retired    Tobacco Use    Smoking status: Former     Current packs/day: 0.00     Average packs/day: 0.3 packs/day for 40.0 years (12.0 ttl pk-yrs)     Types: Cigarettes     Start date: 1971     Quit date: 2011     Years since quittin.2    Smokeless tobacco: Never   Vaping Use    Vaping status: Never Used   Substance and Sexual Activity    Alcohol use: Yes     Alcohol/week: 0.0 - 7.0 standard drinks of alcohol     Comment: 1 glass of wine daily    Drug use: No    Sexual activity: Not Currently     Partners: Male   Other Topics Concern     Service No    Blood Transfusions No    Caffeine Concern No    Occupational Exposure No    Hobby Hazards No    Sleep Concern Yes     Comment: trouble sleeping, poss due to anxiety    Stress Concern Yes     Comment: occassionally    Weight Concern No    Special Diet No    Back Care No    Exercise Yes     Comment: very active at home, goes to fitness center, walking    Seat Belt Yes    Self-Exams Yes     Social Determinants of Health     Financial Resource Strain: Low Risk  (2024)    [facility], [facility]    Overall Financial Resource Strain (CARDIA)     Difficulty of Paying Living Expenses: Not hard at all   Food Insecurity: No Food Insecurity (3/13/2024)    Food Insecurity     Food Insecurity: Never true   Transportation Needs: No Transportation Needs (3/13/2024)    Transportation Needs     Lack of Transportation: No   Physical Activity: Medium Risk (2024)    Received  from [facility]    Exercise Vital Sign     On average, how many days per week do you engage in moderate to strenuous exercise (like a brisk walk)?: 4 days     On average, how many minutes do you engage in exercise at this level?: 30 min   Stress: No Stress Concern Present (2/13/2024)    Received from [facility], [facility]    [facility] - Occupational Stress Questionnaire     Feeling of Stress : Not at all   Housing Stability: Low Risk  (3/13/2024)    Housing Stability     Housing Instability: No        Current Medications:  Current Facility-Administered Medications   Medication Dose Route Frequency    sodium bicarbonate 150 mEq in dextrose 5% 1,000 mL infusion  150 mEq Intravenous Continuous     Medications Prior to Admission   Medication Sig    apixaban 5 MG Oral Tab Take 2 tabs (10mg) by mouth twice daily for 7 days, then take 1 tab (5mg) by mouth twice daily thereafter.    metoprolol succinate 50 MG Oral Tablet 24 Hr Take 1 tablet (50 mg total) by mouth daily.    lisinopril 20 MG Oral Tab Take 1 tablet (20 mg total) by mouth daily. (Patient taking differently: Take 0.5 tablets (10 mg total) by mouth daily.)    furosemide 20 MG Oral Tab Take 1 tablet (20 mg total) by mouth once daily. (Patient taking differently: Take 1 tablet (20 mg total) by mouth every other day. Monday, Wednesday, and Friday)    Rosuvastatin Calcium 20 MG Oral Tab     Albuterol Sulfate  (90 Base) MCG/ACT Inhalation Aero Soln 2 puffs 15 minutes before exercise.    OMEPRAZOLE 20 MG Oral Capsule Delayed Release TAKE 1 CAPSULE(20 MG) BY MOUTH DAILY (Patient taking differently: Take 1 capsule (20 mg total) by mouth as needed.)    Calcium Carbonate-Vitamin D (CALTRATE 600+D OR)     Garlic (GNP GARLIC EXTRACT) 400 MG Oral Tab     Calcium Polycarbophil (FIBER) 625 MG Oral Tab Take by mouth.    ASPIRIN 81 MG OR TABS 1 TABLET DAILY    FISH OIL 1000 MG OR CAPS 1  daily    VITAMIN B COMPLEX IJ 1 Q DAY       Allergies  No Known Allergies    Review of Systems:    Pertinent items are noted in HPI.    Physical Exam:   Blood pressure 105/59, pulse 75, temperature 97.1 °F (36.2 °C), temperature source Temporal, resp. rate 19, SpO2 99%, not currently breastfeeding.    General appearance:  alert, appears stated age, and cooperative  Head: Normocephalic, without obvious abnormality, atraumatic  Eyes: EOMI  Pulmonary: non labored respiratiosn  Cardiovascular: regular rate and rhythm  Extremities: extremities normal, atraumatic, no cyanosis or edema  Neurologic: Grossly normal  Psychiatric: calm    Results:     Laboratory Data:  Lab Results   Component Value Date    WBC 14.6 (H) 10/05/2024    HGB 11.6 (L) 10/05/2024    HCT 33.9 (L) 10/05/2024    .0 10/05/2024    CREATSERUM 2.27 (H) 10/05/2024    BUN 41 (H) 10/05/2024     (L) 10/05/2024    K 3.6 10/05/2024     10/05/2024    CO2 11.0 (L) 10/05/2024     (H) 10/05/2024    CA 9.5 10/05/2024    ALB 4.6 10/05/2024    ALKPHO 129 10/05/2024    TP 7.0 10/05/2024    AST 17 10/05/2024    ALT 14 10/05/2024    PTT 81.4 (H) 03/15/2024    INR 0.91 03/12/2024    PTP 12.3 03/12/2024    TSH 1.986 10/05/2024    LIP 20 10/05/2024    BNP 82 07/18/2011    MG 1.8 10/05/2024    PHOS 3.6 07/30/2011    TROPHS 5 10/05/2024    CK 87 08/28/2017    CKMB 39.0 (H) 07/30/2011         Imaging:  CT CHEST+ABDOMEN+PELVIS(CPT=71250/63748)    Result Date: 10/5/2024  CONCLUSION:   1. Masslike consolidative opacity peripherally within the right upper lobe (4.1 x 2.3 x 2.6 cm).  Given history of lung cancer, this could represent residual/recurrent malignancy, treatment related changes, or a combination there of.  2. There are multiple additional solid nodular opacities with irregular/spiculated margin throughout both lungs.  These are suspicious for multifocal metastases, differential including infectious/inflammatory process.  3. Mild concentric  thickening of the mid/distal esophagus.  This may represent a component of mild esophagitis.  4. Mild hydronephrosis of the right collecting system.  Relative caliber transition noted at the UPJ.  No obstructing lesion or calculus identified.  5. Nodular thickening of the right adrenal gland, not appreciated in 2011. This remains nonspecific.      LOCATION:  [id number]    Dictated by (CST): Tereza Roe MD on 10/05/2024 at 12:00 PM     Finalized by (CST): Tereza Roe MD on 10/05/2024 at 12:12 PM       XR CHEST AP PORTABLE  (CPT=71045)    Result Date: 10/5/2024  CONCLUSION:  1. Nodular opacity within the right upper lobe.  CT is recommended to further evaluate. 2. Interstitial opacities bilaterally which may represent chronic interstitial changes versus mild edema. 3. Minimal left basilar atelectasis/infiltrates.    LOCATION:  [facility]      Dictated by (CST): Rosa Ferreira MD on 10/05/2024 at 10:55 AM     Finalized by (CST): Rosa Ferreira MD on 10/05/2024 at 10:56 AM           Impression:   78 year old woman admitted with weakness who was found to have metabolic acidosis and PRINCESS.  Creatinine has improved since admission with IV hydration.  Urology was consulted because her CT Shows mild right hydronephrosis to the level of the UPJ.    We discussed that there is no evidence of obstructing nephrolithiasis to explain her mild right hydronephrosis. We discussed that this is a non-specific finding.  Other potential etiologies include an anatomic variant, a congenital UPJ obstruction, extrinsic ureteral compression from malignancy or retroperitoneal fibrosis.  Given the mild degree of hydronephrosis, minimal symptoms, and improvement in PRINCESS with hydration, I would not recommend any additional testing or intervention at this point.     Recommend outpatient follow up with a repeat renal ultrasound in 2-3 weeks to monitor for any worsening of her hydronephrosis.     Urology will follow along.  Call with questions.     Thank  you for allowing me to participate in the care of your patient.    Allan Hatch MD  10/5/2024

## 2024-10-05 NOTE — H&P
[facility] History and Physical      Chief Complaint   Patient presents with    Dehydration     Daughter thinks she is dehydrated, not eating losing weight. Getting treatment for lung cancer diagnosed in January         PCP: Guerrero Naik MD      History of Present Illness: Patient is a 78 year old female with PMH sig for metastatic lung cancer on immunotherapy (follows w [facility]), hx PE on eliquis, COPD, HFpEF, PFO, PAD s/p stents, HTN, HL, GERD p/w vomiting and dehydration. Recently started on new immunotherapy and over last few weeks she's starting to get weaker and started throwing up. Also reporting some RUQ/epigastric pain/discomfort. Given ongoing vomiting her daughter was getting concerned that she was looking dehydrated and feeling very weak.   In the ED pressures were low normal. Labs significant for hyponatremia, bicarb 11, leukocytosis. CT CAP with RUL opacity (likely cancer) with multiple opacities (metastatic disease), esophagitis and mild hydronephrosis. Started on IVF. Nephrology, urology and ICU consulted. Admitted to floor for further evaluation and treatment.     Past Medical History:    AAA (abdominal aortic aneurysm) (HCC)    3x3.4 cm, discussed with pt 9/3/20, recheck yearly, quit tob in 2011, treat risk factors.     Abnormal echocardiogram    possible pulm htn, went to [place] for eval 12/19/17 and w/u was negative, note received 8/29/18    Abnormal stress echo    milan Bellamy 7/15/21, 12/13/21    Agatston coronary artery calcium score less than 100    72 at [place], sees Dr. Bellamy, last visit 7/15/21    Atherosclerosis of abdominal aorta (HCC)    at [facility]    Cancer (HCC)    COPD (chronic obstructive pulmonary disease) (HCC)    on cardiopulmonary stress test Dr. Bellamy    Diastolic dysfunction    going to [place], started on furosemide    Eczema    saw derm 10/13    Elevated liver enzymes    presumed to be due to lipitor    Encounter for eye exam    [facility], overdue 9/20    Esophageal  reflux    meds prn    Ex-cigarette smoker    Heart failure with preserved ejection fraction (HCC)    dx at [facility], lasix added    High blood pressure    High cholesterol    Dr. Bellamy managing    History of cardiovascular stress test    obstructive pulmonary component on cardiopulmonary stress test at [facility] ordered by Dr. Bellamy     History of Doppler echocardiogram    at [facility], Dr. Bellamy    History of normal resting EKG    at [facility]    History of nuclear stress test    neg at [facility], EF 57%    Hypercalcemia    Iliac artery occlusion, right (HCC)    Dr. Cantu. had lithotripsy and stent, last US 21 at [facility]: aortic atherosclerosis, patent R iliac stent    Left atrial enlargement    Dr. Sonja Roe at [facility], EF 77%    Living will in place    Osteopenia    improved , further improvement , d/c med after 5 years, recheck     PFO (patent foramen ovale) ([facility])    dx at [facility], felt not to be clinically signicant. no tx needed    Pulmonary nodule, left    on Ct at [facility], also with mildly enlarged nodes    PVD (peripheral vascular disease) ([facility])    saw Dr. Cantu, stent R iliac on 10/6/20, claudication on R side. , f/u 21    Refused influenza vaccine    she's considering for     Screening for cardiovascular condition    normal CGXT with EF of 70%, see below for abn stress echo    Sinus bradycardia    with LAE      Past Surgical History:   Procedure Laterality Date    Colonoscopy N/A 2015    Procedure: COLONOSCOPY;  Surgeon: Garrett Kim MD;  Location:  ENDOSCOPY          x3 with anesthesia    Stent place iliac art o/p ini  10/06/2020    R. Dr. Cantu        ALL:  No Known Allergies     Prior to Admission Medications   Medication Sig    apixaban 5 MG Oral Tab Take 2 tabs (10mg) by mouth twice daily for 7 days, then take 1 tab (5mg) by mouth twice daily thereafter.    metoprolol succinate 50 MG Oral Tablet 24 Hr Take 1 tablet (50 mg total) by mouth daily.    lisinopril 20 MG Oral Tab Take 1 tablet (20 mg  total) by mouth daily. (Patient taking differently: Take 0.5 tablets (10 mg total) by mouth daily.)    furosemide 20 MG Oral Tab Take 1 tablet (20 mg total) by mouth once daily. (Patient taking differently: Take 1 tablet (20 mg total) by mouth every other day. Monday, Wednesday, and Friday)    Rosuvastatin Calcium 20 MG Oral Tab     Albuterol Sulfate  (90 Base) MCG/ACT Inhalation Aero Soln 2 puffs 15 minutes before exercise.    OMEPRAZOLE 20 MG Oral Capsule Delayed Release TAKE 1 CAPSULE(20 MG) BY MOUTH DAILY (Patient taking differently: Take 1 capsule (20 mg total) by mouth as needed.)    Calcium Carbonate-Vitamin D (CALTRATE 600+D OR)     Garlic (GNP GARLIC EXTRACT) 400 MG Oral Tab     Calcium Polycarbophil (FIBER) 625 MG Oral Tab Take by mouth.    ASPIRIN 81 MG OR TABS 1 TABLET DAILY    FISH OIL 1000 MG OR CAPS 1 daily    VITAMIN B COMPLEX IJ 1 Q DAY       Social History     Tobacco Use    Smoking status: Former     Current packs/day: 0.00     Average packs/day: 0.3 packs/day for 40.0 years (12.0 ttl pk-yrs)     Types: Cigarettes     Start date: 1971     Quit date: 2011     Years since quittin.2    Smokeless tobacco: Never   Substance Use Topics    Alcohol use: Yes     Alcohol/week: 0.0 - 7.0 standard drinks of alcohol     Comment: 1 glass of wine daily        Fam Hx  Family History   Problem Relation Age of Onset    Heart Disorder Father     Heart Disorder Son         possible heart prob    Other (Other) Son         transverse myelitis, psoriasis    Hypertension Sister     Hypertension Brother     Heart Disorder Brother 76        MI    Hypertension Brother     Hypertension Brother     Hypertension Sister     No Known Problems Daughter     No Known Problems Daughter     No Known Problems Brother     No Known Problems Brother     Breast Cancer Cousin 58        M-cousin ago of dx 58       Review of Systems  Comprehensive ROS reviewed and negative except for what is stated in HPI.       OBJECTIVE:  BP 94/47   Pulse 68   Temp 97.1 °F (36.2 °C) (Temporal)   Resp 17   SpO2 99%   General:  Alert, no distress, appears stated age.    Head:  Normocephalic, without obvious abnormality, atraumatic.   Eyes:  Sclera anicteric, No conjunctival pallor, EOMs intact.    Nose: Nares normal. Septum midline. Mucosa normal. No drainage.   Throat: Lips, mucosa, and tongue normal. Teeth and gums normal.   Neck: Supple, symmetrical, trachea midline, no cervical or supraclavicular lymph adenopathy, thyroid: no enlargment/tenderness/nodules appreciated   Lungs:   Clear to auscultation bilaterally. Normal effort   Chest wall:  No tenderness or deformity.   Heart:  Regular rate and rhythm, S1, S2 normal, no murmur, rub or gallop appreciated   Abdomen:   Soft, non-tender. Bowel sounds normal. No masses,  No organomegaly. Non distended   Extremities: Extremities normal, atraumatic, no cyanosis or edema.   Skin: Skin color, texture, turgor normal. No rashes or lesions.    Neurologic: Normal strength, no focal deficit appreciated     Data Review:    LABS:   Lab Results   Component Value Date    WBC 14.6 10/05/2024    HGB 11.6 10/05/2024    HCT 33.9 10/05/2024    .0 10/05/2024    CREATSERUM 2.27 10/05/2024    BUN 41 10/05/2024     10/05/2024    K 3.6 10/05/2024     10/05/2024    CO2 11.0 10/05/2024     10/05/2024    CA 9.5 10/05/2024    ALB 4.6 10/05/2024    ALKPHO 129 10/05/2024    BILT 0.3 10/05/2024    TP 7.0 10/05/2024    AST 17 10/05/2024    ALT 14 10/05/2024    TSH 1.986 10/05/2024    LIP 20 10/05/2024    MG 1.8 10/05/2024       CXR: All imaging personally reviewed.      Radiology: CT CHEST+ABDOMEN+PELVIS(CPT=71250/67789)    Result Date: 10/5/2024  PROCEDURE:  CT CHEST+ABDOMEN+PELVIS(CPT=71250/23273)  COMPARISON:  [facility] , CT, CTA GATED CORONARY ARTERIES W CALCIUM SCORING (CPT=75574), 11/21/2017, 1:55 PM.  INDICATIONS:  Vomiting, thoracic back pain, hx of lung cancer  TECHNIQUE:  Following  oral contrast administration, unenhanced multislice CT scanning is performed through the chest, abdomen, and pelvis.  Dose reduction techniques were used. Dose information is transmitted to the ACR (American College of Radiology) NRDR (National Radiology Data Registry) which includes the Dose Index Registry.  PATIENT STATED HISTORY: (As transcribed by Technologist)  general feeling of unwellness. Vomiting. loss of appetite. Weight loss. history of lung cancer.    FINDINGS:   CHEST:  LUNGS:  The lungs are hyperexpanded with advanced, upper lobe predominant emphysema.  Masslike consolidative opacity with spiculated margin identified peripherally within the right upper lobe measuring up to 4.1 x 2.3 cm in cross-section over a 2.6 cm craniocaudal length (series 5, image 44, series 9, image 38).  This was not seen in 2011. There are scattered additional solid nodular opacities with irregular/spiculated margins encompassing portions of the upper lobes, lingula, and bilateral lower lobes, highly suspicious for metastases.  MEDIASTINUM:  Mildly enlarged lymph nodes identified within bilateral lower paratracheal and subcarinal stations, index left lower paratracheal node measuring 1.3 cm in short axis (series 4, image 50).  Mild concentric thickening of the mid/distal esophagus with small hiatal hernia noted. SEAN:  No mass or adenopathy.  CARDIAC:  Heart size within normal limits.  No pericardial effusion.  Moderate coronary calcification. PLEURA:  No pleural effusion or pneumothorax. CHEST WALL:  No mass or axillary adenopathy.  AORTA:  Extensive calcified atherosclerosis with fusiform dilation of the descending thoracic aorta, caliber of 4.1 cm. VASCULATURE:  Dilation of the pulmonary trunk consistent with pulmonary arterial hypertension.  ABDOMEN/PELVIS: LIVER:  No enlargement, atrophy, abnormal density, or significant focal lesion.  BILIARY:  No visible dilatation or calcification.  PANCREAS:  No lesion, fluid  collection, ductal dilatation, or atrophy.  SPLEEN:  Granulomatous calcification of the spleen. KIDNEYS:  Normal renal morphology.  Suspected mild hydronephrosis of the right collecting system with relative caliber transition at the UPJ.  No obstructing lesion or calculus identified. ADRENALS:  Diffuse thickening of the right adrenal gland, not clearly seen in 2011.  Normal left adrenal gland. AORTA:  Extensive calcified atherosclerosis without aneurysm. RETROPERITONEUM:  No mass or adenopathy.  BOWEL/MESENTERY:  No visible mass, obstruction, or bowel wall thickening.  ABDOMINAL WALL:  No mass or hernia.  URINARY BLADDER:  No visible focal wall thickening, lesion, or calculus.  PELVIC NODES:  No adenopathy.  PELVIC ORGANS:  No visible mass.  Pelvic organs appropriate for patient age.  BONES:  Degenerative changes of the spine.  No aggressive osseous lesions.            CONCLUSION:   1. Masslike consolidative opacity peripherally within the right upper lobe (4.1 x 2.3 x 2.6 cm).  Given history of lung cancer, this could represent residual/recurrent malignancy, treatment related changes, or a combination there of.  2. There are multiple additional solid nodular opacities with irregular/spiculated margin throughout both lungs.  These are suspicious for multifocal metastases, differential including infectious/inflammatory process.  3. Mild concentric thickening of the mid/distal esophagus.  This may represent a component of mild esophagitis.  4. Mild hydronephrosis of the right collecting system.  Relative caliber transition noted at the UPJ.  No obstructing lesion or calculus identified.  5. Nodular thickening of the right adrenal gland, not appreciated in 2011. This remains nonspecific.      LOCATION:  [place] [address]    Dictated by (CST): Tereza Roe MD on 10/05/2024 at 12:00 PM     Finalized by (CST): Tereza Roe MD on 10/05/2024 at 12:12 PM       XR CHEST AP PORTABLE  (CPT=71045)    Result Date: 10/5/2024  PROCEDURE:  XR  CHEST AP PORTABLE  (CPT=71045)  TECHNIQUE:  AP chest radiograph was obtained.  COMPARISON:  [facility] , XR, CHEST PA   LATERAL, 7/18/2011, 9:41 PM.  INDICATIONS:  back pain, SOB  PATIENT STATED HISTORY: (As transcribed by Technologist)  Patient offered no additional history at this time.    FINDINGS:  Cardiac size is within normal limits. No pleural effusions. No pneumothorax.  Right upper lobe nodular opacity.  Mild interstitial opacities.  Left basilar opacity.             CONCLUSION:  1. Nodular opacity within the right upper lobe.  CT is recommended to further evaluate. 2. Interstitial opacities bilaterally which may represent chronic interstitial changes versus mild edema. 3. Minimal left basilar atelectasis/infiltrates.    LOCATION:  [facility]      Dictated by (CST): Rosa Ferreira MD on 10/05/2024 at 10:55 AM     Finalized by (CST): Rosa Ferreira MD on 10/05/2024 at 10:56 AM          Assessment/Plan:     78 year old female with PMH sig for metastatic lung cancer on immunotherapy, hx PE on eliquis, COPD, HFpEF, PFO, PAD s/p stents, HTN, HL, GERD p/w vomiting and dehydration.     Acute Renal Injury with acidosis  - suspect prerenal and GI volume losses along with nsaids and lasix/lisinopril  - aggressive isotonic crystalloids with bicarb per nephrology  - ABG with Acidosis, should improve with alkalotic fluids  - hold lasix/ lisinopril, dc nsaids  - avoid nephrotoxins  - monitor renal parameters    L hydronephrosis  - urology consulted    Nausea/vomiting  - side effect of immunotherapy vs. Tumor progression?   - supportive cares  - prn antiemetics     Metastatic Lung cancer, RUL mass  Stage 4 adenocarcinoma  Brain mets s/p radiation tx  - on immunotherapy (trial of artemide)  - follows [facility], appt Tuesday     RUL PE  - eliquis    COPD/Emphysema  - home inhalers    PAD s/p stents  - asa, statin    Essential HTN  - hold lisinopril for PRINCESS    Hyperlipidemia  - statin    GERD  - ppi     FEN: regular diet,  PT/OT  Proph: SCDs, eliquis  Code status: full code    Outpatient records or previous hospital records reviewed.   [facility] hospitalist to continue to follow patient while in house      Jaz Mcdonald MD  [facility]  Hospitalist  Message over [employer]/[facility]  Pager: [phone]

## 2024-10-05 NOTE — CONSULTS
[facility]   part of [facility]    Report of Consultation    Mojgan Jefferson Patient Status:  Emergency    1945 MRN [id number]   Location [facility] EMERGENCY DEPARTMENT Attending Asiya Jacinto,    Hosp Day # 0 PCP Guerrero Naik MD     Date of consult: 10/5/2024    REASON FOR CONSULT:     PRINCESS Acidosis    HISTORY OF PRESENT ILLNESS:     Mojgan Jefferson is a a(n) 78 year old female ho metastatic lung cancer on immunotherapy, PE on eliquis, COPD, HTN, HL, GERD who presents for vomiting and weight loss.     +decreased po intake x 3-4 weeks   + was taking nsaids recently due to back pain  -- alleve for last week but did not take today or yesterday  Intermittent diarrhea and vomiting   Home meds include = lisinopril, lasix, PPI  On artemide    REVIEW OF SYSTEMS:     Please see HPI for pertinent positives. 10 point review of systems otherwise reviewed and negative.     HISTORY:     Past Medical History:    AAA (abdominal aortic aneurysm) (HCC)    3x3.4 cm, discussed with pt 9/3/20, recheck yearly, quit tob in , treat risk factors.     Abnormal echocardiogram    possible pulm htn, went to [facility] for eval 17 and w/u was negative, note received 18    Abnormal stress echo    milan Bellamy 7/15/21, 21    Agatston coronary artery calcium score less than 100    72 at [place], sees Dr. Bellamy, last visit 7/15/21    Atherosclerosis of abdominal aorta (HCC)    at [facility]    Cancer (HCC)    COPD (chronic obstructive pulmonary disease) (HCC)    on cardiopulmonary stress test Dr. Bellamy    Diastolic dysfunction    going to [facility], started on furosemide    Eczema    saw derm 10/13    Elevated liver enzymes    presumed to be due to lipitor    Encounter for eye exam    [facility], overdue     Esophageal reflux    meds prn    Ex-cigarette smoker    Heart failure with preserved ejection fraction (HCC)    dx at [facility], lasix added    High blood pressure    High cholesterol    Dr. Bellamy managing    History  of cardiovascular stress test    obstructive pulmonary component on cardiopulmonary stress test at [facility] ordered by Dr. Bellamy     History of Doppler echocardiogram    at [facility], Dr. Bellamy    History of normal resting EKG    at [facility]    History of nuclear stress test    neg at [place], EF 57%    Hypercalcemia    Iliac artery occlusion, right (HCC)    Dr. Cantu. had lithotripsy and stent, last US 21 at [facility]: aortic atherosclerosis, patent R iliac stent    Left atrial enlargement    Dr. Sonja Roe at [facility], EF 77%    Living will in place    Osteopenia    improved , further improvement , d/c med after 5 years, recheck     PFO (patent foramen ovale) ([facility])    dx at [place], felt not to be clinically signicant. no tx needed    Pulmonary nodule, left    on Ct at [place], also with mildly enlarged nodes    PVD (peripheral vascular disease) ([facility])    saw Dr. Cantu, stent R iliac on 10/6/20, claudication on R side. , f/u 21    Refused influenza vaccine    she's considering for     Screening for cardiovascular condition    normal CGXT with EF of 70%, see below for abn stress echo    Sinus bradycardia    with LAE     Past Surgical History:   Procedure Laterality Date    Colonoscopy N/A 2015    Procedure: COLONOSCOPY;  Surgeon: Garrett Kim MD;  Location:  ENDOSCOPY          x3 with anesthesia    Stent place iliac art o/p ini  10/06/2020    KARENA Cantu     Family History   Problem Relation Age of Onset    Heart Disorder Father     Heart Disorder Son         possible heart prob    Other (Other) Son         transverse myelitis, psoriasis    Hypertension Sister     Hypertension Brother     Heart Disorder Brother 76        MI    Hypertension Brother     Hypertension Brother     Hypertension Sister     No Known Problems Daughter     No Known Problems Daughter     No Known Problems Brother     No Known Problems Brother     Breast Cancer Cousin 58        M-cousin ago of dx 58      reports that she quit smoking  about 13 years ago. Her smoking use included cigarettes. She started smoking about 53 years ago. She has a 12 pack-year smoking history. She has never used smokeless tobacco. She reports current alcohol use. She reports that she does not use drugs.    ALLERGIES:     No Known Allergies    MEDICATIONS:       Current Facility-Administered Medications:     sodium bicarbonate 50 mEq in dextrose 5% 1,050 mL infusion, , Intravenous, Continuous  Prior to Admission Medications   Medication Sig    apixaban 5 MG Oral Tab Take 2 tabs (10mg) by mouth twice daily for 7 days, then take 1 tab (5mg) by mouth twice daily thereafter.    metoprolol succinate 50 MG Oral Tablet 24 Hr Take 1 tablet (50 mg total) by mouth daily.    lisinopril 20 MG Oral Tab Take 1 tablet (20 mg total) by mouth daily. (Patient taking differently: Take 0.5 tablets (10 mg total) by mouth daily.)    furosemide 20 MG Oral Tab Take 1 tablet (20 mg total) by mouth once daily. (Patient taking differently: Take 1 tablet (20 mg total) by mouth every other day. Monday, Wednesday, and Friday)    Rosuvastatin Calcium 20 MG Oral Tab     Albuterol Sulfate  (90 Base) MCG/ACT Inhalation Aero Soln 2 puffs 15 minutes before exercise.    OMEPRAZOLE 20 MG Oral Capsule Delayed Release TAKE 1 CAPSULE(20 MG) BY MOUTH DAILY (Patient taking differently: Take 1 capsule (20 mg total) by mouth as needed.)    Calcium Carbonate-Vitamin D (CALTRATE 600+D OR)     Garlic (GNP GARLIC EXTRACT) 400 MG Oral Tab     Calcium Polycarbophil (FIBER) 625 MG Oral Tab Take by mouth.    ASPIRIN 81 MG OR TABS 1 TABLET DAILY    FISH OIL 1000 MG OR CAPS 1 daily    VITAMIN B COMPLEX IJ 1 Q DAY         PHYSICAL EXAM:     Vital Signs: BP 94/47   Pulse 68   Temp 97.1 °F (36.2 °C) (Temporal)   Resp 17   SpO2 99%   Temp (24hrs), Av.1 °F (36.2 °C), Min:97.1 °F (36.2 °C), Max:97.1 °F (36.2 °C)       Intake/Output Summary (Last 24 hours) at 10/5/2024 1248  Last data filed at 10/5/2024 1019  Gross  per 24 hour   Intake 1000 ml   Output --   Net 1000 ml     Wt Readings from Last 3 Encounters:   04/09/24 137 lb (62.1 kg)   03/15/24 135 lb 9.6 oz (61.5 kg)   09/08/21 137 lb 9.6 oz (62.4 kg)       General: NAD  HEENT: NCAT, MMM, EOMI  Neck: Supple   Cardiac: Regular rate and rhythm   Lungs: CTAB   Abdomen: Soft, non-tender, non-distended   : No CVA tenderness  Extremities: no leg edema  Neurologic/Psych: mentating well, no asterixis  Skin: No rashes    LABORATORY DATA:       Lab Results   Component Value Date     (H) 10/05/2024    BUN 41 (H) 10/05/2024    CREATSERUM 2.27 (H) 10/05/2024    ANIONGAP 11 10/05/2024    GFR 64 11/03/2017    GFRNAA 59 (L) 07/30/2011    GFRAA 71 07/30/2011    CA 9.5 10/05/2024    OSMOCALC 288 10/05/2024    ALKPHO 129 10/05/2024    AST 17 10/05/2024    ALT 14 10/05/2024    BILT 0.3 10/05/2024    TP 7.0 10/05/2024    ALB 4.6 10/05/2024    GLOBULIN 2.4 10/05/2024    AGRATIO 2.5 08/17/2015     (L) 10/05/2024    K 3.6 10/05/2024     10/05/2024    CO2 11.0 (L) 10/05/2024     Lab Results   Component Value Date    WBC 14.6 (H) 10/05/2024    RBC 3.81 10/05/2024    HGB 11.6 (L) 10/05/2024    HCT 33.9 (L) 10/05/2024    .0 10/05/2024    MPV 11.6 (H) 07/30/2011    MCV 89.0 10/05/2024    MCH 30.4 10/05/2024    MCHC 34.2 10/05/2024    RDW 14.3 10/05/2024    NEPRELIM 11.52 (H) 10/05/2024    NEPERCENT 78.8 10/05/2024    LYPERCENT 7.3 10/05/2024    MOPERCENT 8.6 10/05/2024    EOPERCENT 4.0 10/05/2024    BAPERCENT 0.8 10/05/2024    NE 11.52 (H) 10/05/2024    LYMABS 1.07 10/05/2024    MOABSO 1.25 (H) 10/05/2024    EOABSO 0.58 10/05/2024    BAABSO 0.12 10/05/2024     No results found for: \"MALBP\", \"CREUR\", \"CREAURINE\", \"MIALBURINE\", \"MCRRATIOUR\", \"MALBCRECALC\", \"MICROALBUMIN\", \"CREAUR\", \"MALBCREACALC\"  Lab Results   Component Value Date    COLORUR Colorless (A) 10/05/2024    CLARITY Clear 10/05/2024    SPECGRAVITY 1.009 10/05/2024    GLUUR Normal 10/05/2024    BILUR Negative  10/05/2024    KETUR Negative 10/05/2024    BLOODURINE Trace (A) 10/05/2024    PHURINE 6.0 10/05/2024    PROUR 30 (A) 10/05/2024    UROBILINOGEN Normal 10/05/2024    NITRITE Negative 10/05/2024    LEUUR Negative 10/05/2024    NMIC Microscopic not indicated 04/09/2024    WBCUR 1-5 10/05/2024    RBCUR 0-2 10/05/2024    EPIUR Few (A) 10/05/2024    BACUR None Seen 10/05/2024         IMAGING:     All imaging studies personally reviewed.    CT CHEST+ABDOMEN+PELVIS(CPT=71250/87205)    Result Date: 10/5/2024  CONCLUSION:   1. Masslike consolidative opacity peripherally within the right upper lobe (4.1 x 2.3 x 2.6 cm).  Given history of lung cancer, this could represent residual/recurrent malignancy, treatment related changes, or a combination there of.  2. There are multiple additional solid nodular opacities with irregular/spiculated margin throughout both lungs.  These are suspicious for multifocal metastases, differential including infectious/inflammatory process.  3. Mild concentric thickening of the mid/distal esophagus.  This may represent a component of mild esophagitis.  4. Mild hydronephrosis of the right collecting system.  Relative caliber transition noted at the UPJ.  No obstructing lesion or calculus identified.  5. Nodular thickening of the right adrenal gland, not appreciated in 2011. This remains nonspecific.      LOCATION:  [id number]    Dictated by (CST): Tereza Roe MD on 10/05/2024 at 12:00 PM     Finalized by (CST): Tereza Roe MD on 10/05/2024 at 12:12 PM       XR CHEST AP PORTABLE  (CPT=71045)    Result Date: 10/5/2024  CONCLUSION:  1. Nodular opacity within the right upper lobe.  CT is recommended to further evaluate. 2. Interstitial opacities bilaterally which may represent chronic interstitial changes versus mild edema. 3. Minimal left basilar atelectasis/infiltrates.    LOCATION:  [facility]      Dictated by (CST): Rosa Ferreira MD on 10/05/2024 at 10:55 AM     Finalized by (CST): Rosa Ferreira MD  on 10/05/2024 at 10:56 AM          ASSESSMENT/PLAN:   Mojgan Jefferson is a a(n) 78 year old female ho metastatic lung cancer on immunotherapy, PE on eliquis, COPD, HTN, HL, GERD who presents for vomiting and weight loss. Nephrology consulted for PRINCESS and acidosis    PRINCESS   -- likely 2/2 prerenal + nsaids, in the setting of hypotension/lasix/lisinopril. Unclear if her immunotherapy contributing  -- check UA, urine lytes   -- hold home lasix and lisinopril   -- CT w R hydro, consult urology in setting of PRINCESS to ensure/eval   -- IVFs  -- avoid nephrotoxins and renally dose meds     Acidosis  -- lactic normal on VBG, but pH very low. Check ABG. Bicarb ggt. Check beta-hydroxygbuterate     Hyponatremia  -- mild monitor     Anemia  -- transfusion prn     HTN  -- BPs low, hold home meds    D/w Dr Jacinto  D/w pt family bedside     Thank you for allowing me to participate in the care of your patient. Please do not hesitate to contact me with concerns or questions.    Ophelia Miranda MD  [facility]    10/5/2024  12:45 PM

## 2024-10-05 NOTE — ED QUICK NOTES
Orders for admission, patient is aware of plan and ready to go upstairs. Any questions, please call ED RN Kayleigh/Krystal at extension [phone].     Patient Covid vaccination status: Fully vaccinated     COVID Test Ordered in ED: None    COVID Suspicion at Admission: N/A    Running Infusions:    sodium bicarbonate in D5W infusion 150 mEq (10/05/24 1418)        Mental Status/LOC at time of transport: A/A/O x 4    Other pertinent information: Per Intensivist, Pt can be admitted to Telemetry.   CIWA score: N/A   NIH score:  N/A

## 2024-10-05 NOTE — ED QUICK NOTES
Dr. SINAN Jacinto at bedside for re-eval and update. Pt's daughter also at bedside. Pt aware that another urine specimen is needed for urine electrolytes. Dr. SINAN Jacinto relayed to Pt and Pt's daughter that Intensivist will be coming down to evaluate Pt and will decide if Pt needs ICU admission. Pt aware if Pt is admitted to ICU, Pt will need a 2nd IV access and is agreeable when that decision comes.

## 2024-10-06 LAB
ALBUMIN SERPL-MCNC: 3.5 G/DL (ref 3.2–4.8)
ALBUMIN SERPL-MCNC: 3.5 G/DL (ref 3.2–4.8)
ALBUMIN/GLOB SERPL: 1.8 {RATIO} (ref 1–2)
ALP LIVER SERPL-CCNC: 100 U/L
ALT SERPL-CCNC: 10 U/L
ANION GAP SERPL CALC-SCNC: 9 MMOL/L (ref 0–18)
ANION GAP SERPL CALC-SCNC: 9 MMOL/L (ref 0–18)
AST SERPL-CCNC: 11 U/L (ref ?–34)
BASOPHILS # BLD AUTO: 0.07 X10(3) UL (ref 0–0.2)
BASOPHILS NFR BLD AUTO: 0.8 %
BILIRUB SERPL-MCNC: 0.4 MG/DL (ref 0.2–1.1)
BUN BLD-MCNC: 31 MG/DL (ref 9–23)
BUN BLD-MCNC: 31 MG/DL (ref 9–23)
CALCIUM BLD-MCNC: 8.5 MG/DL (ref 8.7–10.4)
CALCIUM BLD-MCNC: 8.5 MG/DL (ref 8.7–10.4)
CHLORIDE SERPL-SCNC: 110 MMOL/L (ref 98–112)
CHLORIDE SERPL-SCNC: 110 MMOL/L (ref 98–112)
CO2 SERPL-SCNC: 18 MMOL/L (ref 21–32)
CO2 SERPL-SCNC: 18 MMOL/L (ref 21–32)
CREAT BLD-MCNC: 1.47 MG/DL
CREAT BLD-MCNC: 1.47 MG/DL
EGFRCR SERPLBLD CKD-EPI 2021: 36 ML/MIN/1.73M2 (ref 60–?)
EGFRCR SERPLBLD CKD-EPI 2021: 36 ML/MIN/1.73M2 (ref 60–?)
EOSINOPHIL # BLD AUTO: 0.5 X10(3) UL (ref 0–0.7)
EOSINOPHIL NFR BLD AUTO: 5.9 %
ERYTHROCYTE [DISTWIDTH] IN BLOOD BY AUTOMATED COUNT: 13.8 %
GLOBULIN PLAS-MCNC: 1.9 G/DL (ref 2–3.5)
GLUCOSE BLD-MCNC: 141 MG/DL (ref 70–99)
GLUCOSE BLD-MCNC: 141 MG/DL (ref 70–99)
HCT VFR BLD AUTO: 26 %
HGB BLD-MCNC: 9.4 G/DL
IMM GRANULOCYTES # BLD AUTO: 0.05 X10(3) UL (ref 0–1)
IMM GRANULOCYTES NFR BLD: 0.6 %
LYMPHOCYTES # BLD AUTO: 0.71 X10(3) UL (ref 1–4)
LYMPHOCYTES NFR BLD AUTO: 8.3 %
MAGNESIUM SERPL-MCNC: 1.5 MG/DL (ref 1.6–2.6)
MCH RBC QN AUTO: 31.3 PG (ref 26–34)
MCHC RBC AUTO-ENTMCNC: 36.2 G/DL (ref 31–37)
MCV RBC AUTO: 86.7 FL
MONOCYTES # BLD AUTO: 0.74 X10(3) UL (ref 0.1–1)
MONOCYTES NFR BLD AUTO: 8.7 %
NEUTROPHILS # BLD AUTO: 6.46 X10 (3) UL (ref 1.5–7.7)
NEUTROPHILS # BLD AUTO: 6.46 X10(3) UL (ref 1.5–7.7)
NEUTROPHILS NFR BLD AUTO: 75.7 %
OSMOLALITY SERPL CALC.SUM OF ELEC: 293 MOSM/KG (ref 275–295)
OSMOLALITY SERPL CALC.SUM OF ELEC: 293 MOSM/KG (ref 275–295)
PHOSPHATE SERPL-MCNC: 2.4 MG/DL (ref 2.4–5.1)
PLATELET # BLD AUTO: 260 10(3)UL (ref 150–450)
POTASSIUM SERPL-SCNC: 3 MMOL/L (ref 3.5–5.1)
POTASSIUM SERPL-SCNC: 3 MMOL/L (ref 3.5–5.1)
POTASSIUM SERPL-SCNC: 3.4 MMOL/L (ref 3.5–5.1)
PROT SERPL-MCNC: 5.4 G/DL (ref 5.7–8.2)
RBC # BLD AUTO: 3 X10(6)UL
SODIUM SERPL-SCNC: 137 MMOL/L (ref 136–145)
SODIUM SERPL-SCNC: 137 MMOL/L (ref 136–145)
WBC # BLD AUTO: 8.5 X10(3) UL (ref 4–11)

## 2024-10-06 RX ORDER — MAGNESIUM SULFATE HEPTAHYDRATE 40 MG/ML
2 INJECTION, SOLUTION INTRAVENOUS ONCE
Status: COMPLETED | OUTPATIENT
Start: 2024-10-06 | End: 2024-10-06

## 2024-10-06 NOTE — SPIRITUAL CARE NOTE
Spiritual Care Visit Note    Patient Name: Mojgan Jefferson Date of Spiritual Care Visit: 10/06/24   : 1945 Primary Dx: Metabolic acidosis       Referred By: Referral From: Nurse    Spiritual Care Taxonomy:    Intended Effects: Convey a calming presence    Methods: Collaborate with care team member;Offer spiritual/Mu-ism support    Interventions: Explain  role;Active listening;Assist someone with Advance Directives    Visit Type/Summary:     - Spiritual Care: Consulted with RN prior to visit. Offered empathic listening and emotional support. Provided information regarding how to contact Spiritual Care and left a Spiritual Care information card.  - PoA: Other: Provided education regarding PoA for Healthcare. Left PoA information with patient for review.   assessed for other spiritual needs.  Left PoA information and Spiritual Care contact information.  remains available for follow up.    Spiritual Care support can be requested via an Epic consult. For urgent/immediate needs, please contact the On Call  at: [facility]: ext [phone]           Resident  Cristal Villegas MA

## 2024-10-06 NOTE — PLAN OF CARE
Assumed pt care at 1930  Aox4, RA, VSS  Tele-NSR  Reported no pain  No n/v/d  Bicarb gtt   Regular diet  K and Mag replaced  Noncardiac electrolyte replacement protocol  Ambulatory  continent  Safety precautions in place  Bed in lowest position and call light within reach  Updated with plan of care  All needs met at this time  Will continue plan of care              Problem: METABOLIC/FLUID AND ELECTROLYTES - ADULT  Goal: Electrolytes maintained within normal limits  Description: INTERVENTIONS:  - Monitor labs and rhythm and assess patient for signs and symptoms of electrolyte imbalances  - Administer electrolyte replacement as ordered  - Monitor response to electrolyte replacements, including rhythm and repeat lab results as appropriate  - Fluid restriction as ordered  - Instruct patient on fluid and nutrition restrictions as appropriate  Outcome: Progressing  Goal: Hemodynamic stability and optimal renal function maintained  Description: INTERVENTIONS:  - Monitor labs and assess for signs and symptoms of volume excess or deficit  - Monitor intake, output and patient weight  - Monitor urine specific gravity, serum osmolarity and serum sodium as indicated or ordered  - Monitor response to interventions for patient's volume status, including labs, urine output, blood pressure (other measures as available)  - Encourage oral intake as appropriate  - Instruct patient on fluid and nutrition restrictions as appropriate  Outcome: Progressing     Problem: METABOLIC/FLUID AND ELECTROLYTES - ADULT  Goal: Hemodynamic stability and optimal renal function maintained  Description: INTERVENTIONS:  - Monitor labs and assess for signs and symptoms of volume excess or deficit  - Monitor intake, output and patient weight  - Monitor urine specific gravity, serum osmolarity and serum sodium as indicated or ordered  - Monitor response to interventions for patient's volume status, including labs, urine output, blood pressure (other  measures as available)  - Encourage oral intake as appropriate  - Instruct patient on fluid and nutrition restrictions as appropriate  Outcome: Progressing

## 2024-10-06 NOTE — PROGRESS NOTES
[facility]   part of [facility]    Nephrology Progress Note    Mojgan Jefferson Attending:  Latoya Mcdonald*     Cc: donaldo, acidosis    SUBJECTIVE     More awake, eating a little  No other complaints     PHYSICAL EXAM   Vital signs: /43 (BP Location: Left arm)   Pulse 90   Temp 97.7 °F (36.5 °C) (Oral)   Resp 18   Wt 112 lb 6.4 oz (51 kg)   SpO2 94%   BMI 21.24 kg/m²   Temp (24hrs), Av °F (36.7 °C), Min:97.7 °F (36.5 °C), Max:98.1 °F (36.7 °C)       Intake/Output Summary (Last 24 hours) at 10/6/2024 1123  Last data filed at 10/6/2024 0900  Gross per 24 hour   Intake --   Output 1 ml   Net -1 ml     Wt Readings from Last 3 Encounters:   10/06/24 112 lb 6.4 oz (51 kg)   24 137 lb (62.1 kg)   03/15/24 135 lb 9.6 oz (61.5 kg)     General: NAD  HEENT: NCAT, EOMI, MMM  Neck: Supple   Cardiac: Regular rate and rhythm   Lungs: CTAB  Abdomen: Soft, non-tender, nondistended   Extremities: No edema  Neurologic: No asterxis  Skin: Warm and dry, no rashes     MEDS     Current Facility-Administered Medications   Medication Dose Route Frequency    potassium phosphate dibasic 15 mmol in sodium chloride 0.9% 250 mL IVPB  15 mmol Intravenous Once    Followed by    potassium chloride 40 mEq in 250mL sodium chloride 0.9% IVPB premix  40 mEq Intravenous Once    sodium bicarbonate 150 mEq in dextrose 5% 1,000 mL infusion  150 mEq Intravenous Continuous    apixaban (Eliquis) tab 5 mg  5 mg Oral BID    acetaminophen (Tylenol Extra Strength) tab 500 mg  500 mg Oral Q4H PRN    melatonin tab 3 mg  3 mg Oral Nightly PRN    polyethylene glycol (PEG 3350) (Miralax) 17 g oral packet 17 g  17 g Oral Daily PRN    sennosides (Senokot) tab 17.2 mg  17.2 mg Oral Nightly PRN    bisacodyl (Dulcolax) 10 MG rectal suppository 10 mg  10 mg Rectal Daily PRN    fleet enema (Fleet) rectal enema 133 mL  1 enema Rectal Once PRN    ondansetron (Zofran) 4 MG/2ML injection 4 mg  4 mg Intravenous Q6H PRN    metoclopramide  (Reglan) 5 mg/mL injection 5 mg  5 mg Intravenous Q8H PRN    magnesium oxide (Mag-Ox) tab 400 mg  400 mg Oral Once    albuterol (Ventolin HFA) 108 (90 Base) MCG/ACT inhaler 1 puff  1 puff Inhalation Q6H PRN    aspirin chewable tab 81 mg  81 mg Oral Daily    rosuvastatin (Crestor) tab 5 mg  5 mg Oral Nightly    traMADol (Ultram) tab 50 mg  50 mg Oral Q12H PRN    hydrOXYzine (Atarax) tab 25 mg  25 mg Oral TID PRN       LABS     Lab Results   Component Value Date    WBC 8.5 10/06/2024    HGB 9.4 10/06/2024    HCT 26.0 10/06/2024    .0 10/06/2024    CREATSERUM 1.47 10/06/2024    CREATSERUM 1.47 10/06/2024    BUN 31 10/06/2024    BUN 31 10/06/2024     10/06/2024     10/06/2024    K 3.0 10/06/2024    K 3.0 10/06/2024     10/06/2024     10/06/2024    CO2 18.0 10/06/2024    CO2 18.0 10/06/2024     10/06/2024     10/06/2024    CA 8.5 10/06/2024    CA 8.5 10/06/2024    ALB 3.5 10/06/2024    ALB 3.5 10/06/2024    ALKPHO 100 10/06/2024    BILT 0.4 10/06/2024    TP 5.4 10/06/2024    AST 11 10/06/2024    ALT 10 10/06/2024    MG 1.5 10/06/2024    PHOS 2.4 10/06/2024       IMAGING   All imaging studies personally reviewed.    CT CHEST+ABDOMEN+PELVIS(CPT=71250/62829)   Final Result   PROCEDURE:  CT CHEST+ABDOMEN+PELVIS(CPT=71250/34470)       COMPARISON:  [facility], CTA GATED CORONARY ARTERIES W CALCIUM SCORING    (CPT=75574), 11/21/2017, 1:55 PM.       INDICATIONS:  Vomiting, thoracic back pain, hx of lung cancer       TECHNIQUE:  Following oral contrast administration, unenhanced multislice    CT scanning is performed through the chest, abdomen, and pelvis.  Dose    reduction techniques were used. Dose information is transmitted to the ACR    (American College of Radiology)    NRDR (National Radiology Data Registry) which includes the Dose Index    Registry.       PATIENT STATED HISTORY: (As transcribed by Technologist)  general feeling    of unwellness. Vomiting. loss of appetite.  Weight loss. history of lung    cancer.             FINDINGS:         CHEST:     LUNGS:  The lungs are hyperexpanded with advanced, upper lobe predominant    emphysema.  Masslike consolidative opacity with spiculated margin    identified peripherally within the right upper lobe measuring up to 4.1 x    2.3 cm in cross-section over a 2.6 cm    craniocaudal length (series 5, image 44, series 9, image 38).  This was    not seen in 2011. There are scattered additional solid nodular opacities    with irregular/spiculated margins encompassing portions of the upper    lobes, lingula, and bilateral lower    lobes, highly suspicious for metastases.     MEDIASTINUM:  Mildly enlarged lymph nodes identified within bilateral    lower paratracheal and subcarinal stations, index left lower paratracheal    node measuring 1.3 cm in short axis (series 4, image 50).  Mild concentric    thickening of the mid/distal    esophagus with small hiatal hernia noted.   SEAN:  No mass or adenopathy.     CARDIAC:  Heart size within normal limits.  No pericardial effusion.     Moderate coronary calcification.   PLEURA:  No pleural effusion or pneumothorax.   CHEST WALL:  No mass or axillary adenopathy.     AORTA:  Extensive calcified atherosclerosis with fusiform dilation of the    descending thoracic aorta, caliber of 4.1 cm.    VASCULATURE:  Dilation of the pulmonary trunk consistent with pulmonary    arterial hypertension.       ABDOMEN/PELVIS:   LIVER:  No enlargement, atrophy, abnormal density, or significant focal    lesion.     BILIARY:  No visible dilatation or calcification.     PANCREAS:  No lesion, fluid collection, ductal dilatation, or atrophy.     SPLEEN:  Granulomatous calcification of the spleen.   KIDNEYS:  Normal renal morphology.  Suspected mild hydronephrosis of the    right collecting system with relative caliber transition at the UPJ.  No    obstructing lesion or calculus identified.   ADRENALS:  Diffuse thickening of the  right adrenal gland, not clearly seen    in 2011.  Normal left adrenal gland.   AORTA:  Extensive calcified atherosclerosis without aneurysm.   RETROPERITONEUM:  No mass or adenopathy.     BOWEL/MESENTERY:  No visible mass, obstruction, or bowel wall thickening.        ABDOMINAL WALL:  No mass or hernia.     URINARY BLADDER:  No visible focal wall thickening, lesion, or calculus.     PELVIC NODES:  No adenopathy.     PELVIC ORGANS:  No visible mass.  Pelvic organs appropriate for patient    age.     BONES:  Degenerative changes of the spine.  No aggressive osseous lesions.                         =====   CONCLUSION:         1. Masslike consolidative opacity peripherally within the right upper lobe    (4.1 x 2.3 x 2.6 cm).  Given history of lung cancer, this could represent    residual/recurrent malignancy, treatment related changes, or a combination    there of.       2. There are multiple additional solid nodular opacities with    irregular/spiculated margin throughout both lungs.  These are suspicious    for multifocal metastases, differential including infectious/inflammatory    process.       3. Mild concentric thickening of the mid/distal esophagus.  This may    represent a component of mild esophagitis.       4. Mild hydronephrosis of the right collecting system.  Relative caliber    transition noted at the UPJ.  No obstructing lesion or calculus    identified.       5. Nodular thickening of the right adrenal gland, not appreciated in 2011.    This remains nonspecific.                     LOCATION:  [place] [address]               Dictated by (CST): Tereza Roe MD on 10/05/2024 at 12:00 PM        Finalized by (CST): Tereza Roe MD on 10/05/2024 at 12:12 PM         XR CHEST AP PORTABLE  (CPT=71045)   Final Result   PROCEDURE:  XR CHEST AP PORTABLE  (CPT=71045)       TECHNIQUE:  AP chest radiograph was obtained.       COMPARISON:  [facility] , XR, CHEST PA   LATERAL, 7/18/2011, 9:41 PM.       INDICATIONS:  back pain, SOB        PATIENT STATED HISTORY: (As transcribed by Technologist)  Patient offered    no additional history at this time.             FINDINGS:  Cardiac size is within normal limits. No pleural effusions. No    pneumothorax.  Right upper lobe nodular opacity.  Mild interstitial    opacities.  Left basilar opacity.                             =====   CONCLUSION:     1. Nodular opacity within the right upper lobe.  CT is recommended to    further evaluate.   2. Interstitial opacities bilaterally which may represent chronic    interstitial changes versus mild edema.   3. Minimal left basilar atelectasis/infiltrates.             LOCATION:  [facility]                       Dictated by (CST): Rosa Ferreira MD on 10/05/2024 at 10:55 AM        Finalized by (CST): Rosa Ferreira MD on 10/05/2024 at 10:56 AM               ASSESSMENT & PLAN    78 year old female ho metastatic lung cancer on immunotherapy, PE on eliquis, COPD, HTN, HL, GERD who presents for vomiting and weight loss. Nephrology consulted for PRINCESS and acidosis     PRINCESS   -- likely 2/2 prerenal + nsaids, in the setting of hypotension/lasix/lisinopril. Unclear if her immunotherapy contributing  -- UA w no RBCs, 30 protein. Urine na 70 but after IVF  -- hold home lasix and lisinopril   -- CT w mild R hydro, urology consulted in setting of PRINCESS to ensure/eval, no intervention planned, appreciated   -- continue IVFs  -- avoid nephrotoxins and renally dose meds      Acidosis  -- lactic normal. ABG noted. D/w ICU yesterday. Bicarb ggt. Check beta-hydroxygbuterate  - pending     Hyponatremia  -- improved w IVFs    Hyponatremia / Hypomangesium   -- replete and monitor per electrolyte protocol     Anemia  -- transfusion prn      HTN  -- BPs low, hold home meds      D/w Dr Mcdonald  D/w pt family bedside    Thank you for allowing me to participate in the care of this patient. Please do not hesitate to call with questions or concerns.       Ophelia Miranda MD  [facility]  Nephrology

## 2024-10-06 NOTE — PHYSICAL THERAPY NOTE
PHYSICAL THERAPY EVALUATION - INPATIENT     Room Number: 3606/3606-A  Evaluation Date: 10/6/2024  Type of Evaluation: Initial  Physician Order: PT Eval and Treat    Presenting Problem: vomiting, poor appetite, weight loss  Co-Morbidities : lung CA, COPD, AAA, CHF, PVD, HTN, HLD, GERD  Reason for Therapy: Mobility Dysfunction and Discharge Planning    PHYSICAL THERAPY ASSESSMENT   Patient is a 78 year old female admitted 10/5/2024 for generalized weakness due to vomiting, poor appetite and weight loss.   Patient is currently functioning at baseline with bed mobility, transfers, and gait. Prior to admission, patient's baseline is independent.       PLAN  Patient has been evaluated and presents with no skilled Physical Therapy needs at this time.  Patient discharged from Physical Therapy services.  Please re-order if a new functional limitation presents during this admission.    PT Device Recommendation: Rolling walker    GOALS  Patient was able to achieve the following goals ...    Patient was able to transfer At previous, functional level  Safely and independently   Patient able to ambulate on level surfaces At previous, functional level  Safely and independently     HOME SITUATION  Type of Home: House  Home Layout: One level  Stairs to Enter : 1 (stoop)    Lives With: Alone    Drives: Yes   Patient Regularly Uses: Reading glasses;Home O2;Rollator (patient reports uses rollator to hold O2 tank but not needed)     Prior Level of [place]: Pt reports living in a ranch home alone. Pt reports driving and fully independent with all functional mobility. Pt reports using a rollator to hold her O2 tank for long distance ambulation but typically does not use any device inside home.     SUBJECTIVE  \" I just have this little pain but not much\"    OBJECTIVE  Precautions: None  Fall Risk: Standard fall risk    WEIGHT BEARING RESTRICTION     PAIN ASSESSMENT  Rating: Unable to rate (\"tolerable, not bad\")  Location: R  flank  Management Techniques: Breathing techniques;Relaxation;Repositioning    COGNITION  Overall Cognitive Status:  WFL - within functional limits    RANGE OF MOTION AND STRENGTH ASSESSMENT  Upper extremity ROM and strength are within functional limits     Lower extremity ROM is within functional limits     Lower extremity strength is within functional limits     BALANCE  Static Sitting: Good  Dynamic Sitting: Good  Static Standing: Fair +  Dynamic Standing: Fair +    O2 WALK  Oxygen Therapy  SPO2% on Room Air at Rest: 96        AM-PAC '6-Clicks' INPATIENT SHORT FORM - BASIC MOBILITY  How much difficulty does the patient currently have...  Patient Difficulty: Turning over in bed (including adjusting bedclothes, sheets and blankets)?: None   Patient Difficulty: Sitting down on and standing up from a chair with arms (e.g., wheelchair, bedside commode, etc.): None   Patient Difficulty: Moving from lying on back to sitting on the side of the bed?: None   How much help from another person does the patient currently need...   Help from Another: Moving to and from a bed to a chair (including a wheelchair)?: None   Help from Another: Need to walk in hospital room?: None   Help from Another: Climbing 3-5 steps with a railing?: A Little       AM-PAC Score:  Raw Score: 23   Approx Degree of Impairment: 11.2%   Standardized Score (AM-PAC Scale): 56.93   CMS Modifier (G-Code): CI    FUNCTIONAL ABILITY STATUS  Gait Assessment   Functional Mobility/Gait Assessment  Gait Assistance: Supervision  Distance (ft): 100  Assistive Device: None  Pattern: Within Functional Limits    Skilled Therapy Provided     Bed Mobility:  Rolling: independent  Supine to sit: independent   Sit to supine: independent     Transfer Mobility:  Sit to stand: supervision   Stand to sit: supervision  Gait = supervision    Therapist's comments:RN cleared pt for session. Pt received elevated supine in bed, agreeable to session. Pt performed above functional  mobility. Pt's daughter in room and reports pt needs O2 when ambulating. RN made aware. O2 provided at 1L NC. Pt with no c/o SOB. Pt requested to get back in bed to get some sleep vs chair. Educated on sitting up for meals and to ambulate the hallway with staff at least 3x/day. Pt verbalized understanding and agreement. Pt left in bed with daughter in room.     Exercise/Education Provided:  Energy conservation  Functional activity tolerated  Gait training  Transfer training    Patient End of Session: In bed;Needs met;Call light within reach;RN aware of session/findings;All patient questions and concerns addressed;Hospital anti-slip socks;Family present    Patient Evaluation Complexity Level:  History Moderate - 1 or 2 personal factors and/or co-morbidities   Examination of body systems Low -  addressing 1-2 elements   Clinical Presentation Low- Stable   Clinical Decision Making Low Complexity       PT Session Time: 30 minutes  Gait Training: 10 minutes  Therapeutic Activity: 15 minutes  Neuromuscular Re-education:  minutes  Therapeutic Exercise:  minutes   no

## 2024-10-06 NOTE — PROGRESS NOTES
[facility]  Hospitalist Progress Note                                                                     [facility]   part of [facility]        Mojgan PRASAD [place]  12/21/1945    SUBJECTIVE:  Patient seen and examined.  Feeling better.   Still with poor appetite but tried some breakfast today.  Denies CP/Sob.  NAD.       OBJECTIVE:  Temp:  [97.7 °F (36.5 °C)-98.1 °F (36.7 °C)] 97.7 °F (36.5 °C)  Pulse:  [] 90  Resp:  [12-22] 18  BP: ()/(39-83) 131/43  SpO2:  [91 %-100 %] 94 %  Exam  Gen: No acute distress, alert and oriented x3, no focal neurologic deficits  Pulm: Lungs clear bilaterally, normal respiratory effort  CV: Heart with regular rate and rhythm, no murmur.  Normal PMI.    Abd: Abdomen soft, nontender, nondistended, no organomegaly, bowel sounds present  MSK: Full range of motion in extremities, no clubbing, no cyanosis  Skin: no rashes or lesions    Labs:   Recent Labs   Lab 10/05/24  0916 10/06/24  0524   WBC 14.6* 8.5   HGB 11.6* 9.4*   MCV 89.0 86.7   .0 260.0       Recent Labs   Lab 10/05/24  0916 10/05/24  1941 10/06/24  0524   * 134* 137  137   K 3.6 2.8* 3.0*  3.0*    109 110  110   CO2 11.0* 14.0* 18.0*  18.0*   BUN 41* 35* 31*  31*   CREATSERUM 2.27* 1.72* 1.47*  1.47*   CA 9.5 8.8 8.5*  8.5*   MG 1.8 1.5* 1.5*   PHOS  --  3.5 2.4   * 138* 141*  141*       Recent Labs   Lab 10/05/24  0916 10/05/24  1941 10/06/24  0524   ALT 14  --  10   AST 17  --  11   ALB 4.6 3.4 3.5  3.5       No results for input(s): \"PGLU\" in the last 168 hours.    Meds:   Scheduled:    potassium phosphate dibasic 15 mmol in sodium chloride 0.9% 250 mL IVPB  15 mmol Intravenous Once    Followed by    potassium chloride  40 mEq Intravenous Once    apixaban  5 mg Oral BID    magnesium oxide  400 mg Oral Once    aspirin  81 mg Oral Daily    rosuvastatin  5 mg Oral Nightly     Continuous Infusions:    sodium bicarbonate  in D5W infusion 150 mEq (10/06/24 0841)     PRN:   acetaminophen    melatonin    polyethylene glycol (PEG 3350)    sennosides    bisacodyl    fleet enema    ondansetron    metoclopramide    albuterol    traMADol    hydrOXYzine    Assessment/Plan:  Principal Problem:    Metabolic acidosis  Active Problems:    PRINCESS (acute kidney injury) (HCC)    Primary malignant neoplasm of lung metastatic to other site, unspecified laterality (HCC)    78 year old female with PMH sig for metastatic lung cancer on immunotherapy, hx PE on eliquis, COPD, HFpEF, PFO, PAD s/p stents, HTN, HL, GERD p/w vomiting and dehydration.      Acute Renal Injury with acidosis  - suspect prerenal from poor po intake and GI volume losses along with nsaids and lasix/lisinopril  - aggressive isotonic crystalloids with bicarb per nephrology  - ABG with Acidosis, should improve with alkalotic fluids  - hold lasix/ lisinopril, dc nsaids  - avoid nephrotoxins  - monitor renal parameters     L hydronephrosis  - urology consulted, outpatient surveillance      Nausea/vomiting - stable  - side effect of immunotherapy vs. Tumor progression?   - supportive cares  - prn antiemetics      Metastatic Lung cancer, RUL mass  Stage 4 adenocarcinoma  Brain mets s/p radiation tx  - on immunotherapy (trial of artemide)  - follows [facility], appt Tuesday      RUL PE  - eliquis     COPD/Emphysema  - home inhalers     PAD s/p stents  - asa, statin     Essential HTN  - hold lisinopril for PRINCESS     Hyperlipidemia  - statin     GERD  - ppi      FEN: regular diet, PT/OT  Proph: SCDs, eliquis  Code status: full code    Dispo - JAMEL 1-2 days, she has oncology appt on Tuesday that she's hoping to make    Jaz Mcdonald MD  [facility] Hospitalist  Message over [employer]  Pager: [phone]

## 2024-10-06 NOTE — SPIRITUAL CARE NOTE
Spiritual Care Visit Note    Patient Name: Mojgna Jefferson Date of Spiritual Care Visit: 10/05/24   : 1945 Primary Dx: Metabolic acidosis       Referred By:      Spiritual Care Taxonomy:    Intended Effects: Elizabeth affirmation    Methods: Collaborate with care team member    Interventions: Connect someone with their elizabeth community/clergy    Visit Type/Summary:     - Spiritual Care: Provided support for Patient's spiritual/Restorationism requests. Coordinated Taoist Communion and verified NPO status.    Spiritual Care support can be requested via an Epic consult. For urgent/immediate needs, please contact the On Call  at: [facility]: ext [phone]    Rev. Jazz Tan MDiv

## 2024-10-06 NOTE — PLAN OF CARE
Assumed patient care @ 07:30  Alert and oriented x 4.  Room air.  NSR on telemetry.  Na bicarb infusing at 125 ml./hr.  Daily weight.  Pain on her R side.  Non cardiac electrolyte protocol.  Stand by assist.  K replete.  Regular diet.  Safety fall precautions maintained.  Monitoring electrolyte and replace per protocol.  Needs attended, call light within her reach.  Bed in lowest position, bed alarm is on.  Problem: METABOLIC/FLUID AND ELECTROLYTES - ADULT  Goal: Electrolytes maintained within normal limits  Description: INTERVENTIONS:  - Monitor labs and rhythm and assess patient for signs and symptoms of electrolyte imbalances  - Administer electrolyte replacement as ordered  - Monitor response to electrolyte replacements, including rhythm and repeat lab results as appropriate  - Fluid restriction as ordered  - Instruct patient on fluid and nutrition restrictions as appropriate  Outcome: Progressing  Goal: Hemodynamic stability and optimal renal function maintained  Description: INTERVENTIONS:  - Monitor labs and assess for signs and symptoms of volume excess or deficit  - Monitor intake, output and patient weight  - Monitor urine specific gravity, serum osmolarity and serum sodium as indicated or ordered  - Monitor response to interventions for patient's volume status, including labs, urine output, blood pressure (other measures as available)  - Encourage oral intake as appropriate  - Instruct patient on fluid and nutrition restrictions as appropriate  Outcome: Progressing

## 2024-10-07 LAB
ALBUMIN SERPL-MCNC: 3.2 G/DL (ref 3.2–4.8)
ANION GAP SERPL CALC-SCNC: 6 MMOL/L (ref 0–18)
ATRIAL RATE: 68 BPM
B-HYDROXYBUTYRATE: <0.1 MMOL/L
B-HYDROXYBUTYRATE: <0.1 MMOL/L
BASOPHILS # BLD AUTO: 0.05 X10(3) UL (ref 0–0.2)
BASOPHILS NFR BLD AUTO: 0.7 %
BUN BLD-MCNC: 16 MG/DL (ref 9–23)
CALCIUM BLD-MCNC: 8.1 MG/DL (ref 8.7–10.4)
CHLORIDE SERPL-SCNC: 101 MMOL/L (ref 98–112)
CO2 SERPL-SCNC: 27 MMOL/L (ref 21–32)
CREAT BLD-MCNC: 1.07 MG/DL
EGFRCR SERPLBLD CKD-EPI 2021: 53 ML/MIN/1.73M2 (ref 60–?)
EOSINOPHIL # BLD AUTO: 0.47 X10(3) UL (ref 0–0.7)
EOSINOPHIL NFR BLD AUTO: 6.6 %
ERYTHROCYTE [DISTWIDTH] IN BLOOD BY AUTOMATED COUNT: 13.8 %
GLUCOSE BLD-MCNC: 124 MG/DL (ref 70–99)
HCT VFR BLD AUTO: 24.2 %
HGB BLD-MCNC: 8.7 G/DL
IMM GRANULOCYTES # BLD AUTO: 0.03 X10(3) UL (ref 0–1)
IMM GRANULOCYTES NFR BLD: 0.4 %
LYMPHOCYTES # BLD AUTO: 0.75 X10(3) UL (ref 1–4)
LYMPHOCYTES NFR BLD AUTO: 10.5 %
MAGNESIUM SERPL-MCNC: 1.6 MG/DL (ref 1.6–2.6)
MCH RBC QN AUTO: 30.7 PG (ref 26–34)
MCHC RBC AUTO-ENTMCNC: 36 G/DL (ref 31–37)
MCV RBC AUTO: 85.5 FL
MONOCYTES # BLD AUTO: 0.69 X10(3) UL (ref 0.1–1)
MONOCYTES NFR BLD AUTO: 9.7 %
NEUTROPHILS # BLD AUTO: 5.13 X10 (3) UL (ref 1.5–7.7)
NEUTROPHILS # BLD AUTO: 5.13 X10(3) UL (ref 1.5–7.7)
NEUTROPHILS NFR BLD AUTO: 72.1 %
OSMOLALITY SERPL CALC.SUM OF ELEC: 281 MOSM/KG (ref 275–295)
P AXIS: 48 DEGREES
P-R INTERVAL: 204 MS
PHOSPHATE SERPL-MCNC: 1.7 MG/DL (ref 2.4–5.1)
PHOSPHATE SERPL-MCNC: 1.7 MG/DL (ref 2.4–5.1)
PLATELET # BLD AUTO: 234 10(3)UL (ref 150–450)
POTASSIUM SERPL-SCNC: 3.4 MMOL/L (ref 3.5–5.1)
POTASSIUM SERPL-SCNC: 3.4 MMOL/L (ref 3.5–5.1)
POTASSIUM SERPL-SCNC: 3.8 MMOL/L (ref 3.5–5.1)
Q-T INTERVAL: 410 MS
QRS DURATION: 98 MS
QTC CALCULATION (BEZET): 435 MS
R AXIS: 18 DEGREES
RBC # BLD AUTO: 2.83 X10(6)UL
SODIUM SERPL-SCNC: 134 MMOL/L (ref 136–145)
T AXIS: 37 DEGREES
VENTRICULAR RATE: 68 BPM
WBC # BLD AUTO: 7.1 X10(3) UL (ref 4–11)

## 2024-10-07 RX ORDER — PANTOPRAZOLE SODIUM 40 MG/1
40 TABLET, DELAYED RELEASE ORAL
Status: DISCONTINUED | OUTPATIENT
Start: 2024-10-08 | End: 2024-10-08

## 2024-10-07 RX ORDER — SODIUM CHLORIDE, SODIUM LACTATE, POTASSIUM CHLORIDE, CALCIUM CHLORIDE 600; 310; 30; 20 MG/100ML; MG/100ML; MG/100ML; MG/100ML
INJECTION, SOLUTION INTRAVENOUS CONTINUOUS
Status: DISCONTINUED | OUTPATIENT
Start: 2024-10-07 | End: 2024-10-08

## 2024-10-07 RX ORDER — POTASSIUM CHLORIDE 14.9 MG/ML
20 INJECTION INTRAVENOUS ONCE
Status: COMPLETED | OUTPATIENT
Start: 2024-10-07 | End: 2024-10-07

## 2024-10-07 NOTE — PLAN OF CARE
A/Ox4. Pleasant and cooperative.  On 1L O2, pt wears PRN at home. Tele-NSR/ST  Electrolyte protocol per renal. K+ and Phos being replaced.   Daily weight.   Bicarb gtt infusing.  Pt anxious to go home.   Denies any pain or needs at this time.  Staff will cont to monitor.      Problem: METABOLIC/FLUID AND ELECTROLYTES - ADULT  Goal: Electrolytes maintained within normal limits  Description: INTERVENTIONS:  - Monitor labs and rhythm and assess patient for signs and symptoms of electrolyte imbalances  - Administer electrolyte replacement as ordered  - Monitor response to electrolyte replacements, including rhythm and repeat lab results as appropriate  - Fluid restriction as ordered  - Instruct patient on fluid and nutrition restrictions as appropriate  Outcome: Progressing  Goal: Hemodynamic stability and optimal renal function maintained  Description: INTERVENTIONS:  - Monitor labs and assess for signs and symptoms of volume excess or deficit  - Monitor intake, output and patient weight  - Monitor urine specific gravity, serum osmolarity and serum sodium as indicated or ordered  - Monitor response to interventions for patient's volume status, including labs, urine output, blood pressure (other measures as available)  - Encourage oral intake as appropriate  - Instruct patient on fluid and nutrition restrictions as appropriate  Outcome: Progressing

## 2024-10-07 NOTE — DISCHARGE INSTRUCTIONS
**THIS IS NOT A REFERRAL**    Unless your physician has indicated otherwise, please call [phone] to schedule YOUR RENAL ULTRASOUND at one of the following [facility] locations. Have ultrasound prior to your urology office visit with Dr. Hatch.    [place]: [address], [address]  [place]: [address]   [place]: [address]  [place]: [address]   [place]: [address] [facility] [facility]: [address]  [place]: [address]  [place]: [address]   [place]: [address]   [place]: [address]  [place]: [address] [place] [address]: [address] [facility]: [address]    Important note regarding exams that require a referral/authorization: As a service to you, [facility] will obtain any necessary prior authorization required by your benefit plan provided you choose to have your test performed at one of the locations listed above. To initiate the precertification process, please call our scheduling department at [phone] to secure an appointment time with one of our facilities. At that time, our department will begin to work on any necessary authorizations that may be needed for your exam. Please note that the approval process could take 3-5 days. Failure to obtain required authorization numbers may create reimbursement difficulties for you.     If you choose to have this test performed at a location other than those listed above, you are responsible for obtaining any prior authorization required by your benefit plan.    [facility]      Nutrition    Try using high calorie beverages such as smoothies, milkshakes, full fat chocolate milk, or protein shakes to get in adequate nutrition.  Try mixing your protein shakes into smoothies to amp up the nutrition.  For questions or concerns, feel free to contact Nhung BUSH,MARIE,[facility]   [phone]    High Protein and Calorie Shakes   Adapted from [place]  The  is your friend! Get creative with high calorie shakes and smoothies. Here   are a few recipes to get your started. Calorie and protein amounts are listed per   serving.    Milk-Based Shakes  You can substitute any of the following in place of milk: liquid nutritional   supplements, soy milk, silken tofu, Lactaid® milk, or non-dairy creamers.     HIGH PROTEIN MILK  1 quart low fat milk   1 cup nonfat dried milk powder  Blenderize ingredients until powdered   milk dissolves. Refrigerate.     KEY LIME DELIGHT SHAKE  2 cups vanilla yogurt  6 oz key lime yogurt  2 ripe bananas  1/3 cup milk (or substitute)      Makes 2 servings.   360 calories, 12 gm protein     SHERBET SHAKE  ¾ cup milk (or substitute)   1 cup sherbet, any flavor  Put ingredients into , cover, and   blend until smooth.  360 calories, 8 gm protein     COFFEE BUZZ  2 tsp of instant coffee, mixed in 1 TBSP   water  1 cup milk (or substitute)  1 pack of chocolate or vanilla Instant   Breakfast  Blend all ingredients together  4 of 5    BASIC MILKSHAKE  ¾ cup milk (or substitute)  1 cup ice cream  Put all ingredients into a , cover,   and blend until smooth.   360 calories, 10 gm protein.  Flavoring ideas:  ? 1 to 2 Tbsp chocolate, strawberry,  or caramel syrup  ? ½ mashed banana   ? ¼ to ½ cup fresh or frozen   strawberries  ? 1 to 2 Tbsp peanut butter  ? 2-4 crumbled chocolate sandwich   Cookies    THE SUPER SHAKE  1, 8 oz can Ensure® Plus/Boost® Plus or   equivalent  1 cup whole milk  ½ cup ice cream  Put all ingredients into a  and   blend until smooth. Makes 2 servings.  370 calories, 14g protein     COCOA SUPREME  1 envelope chocolate Instant Breakfast®  8 oz milk  1 tsp chocolate syrup  Heat milk and add Instant Breakfast and   syrup. Stir well to blend. Top with   marshmallows.     STRAWBERRY-BANANA FRAPPE   (not sweet)  1 cup  milk (or substitute)   2 bananas   1 carton (8 oz.) strawberry yogurt   1 Tbsp lemon juice  Combine all ingredients in .   Makes 2 servings. 275 calories, 9 gm   Protein    HIGH PROTEIN/HIGH ENERGY   SHAKE  ½ cup milk (or substitute)   1 package Instant Breakfast®  ¼ cup egg substitute   ½ cup ice cream  Put all ingredients into a  and   blend until smooth.  470 calories, 22 gm protein     MARILUZ-PEANUT SHAKE  ½ cup milk (or substitute)   1 banana  2½ Tbsp peanut butter  1 cup vanilla ice cream  Place milk in  container. Add   banana, peanut butter, and ice cream.   Cover; blend on high for one minute or   until thick and smooth. Makes 2   servings. 330 calories, 10 gm protein     MALTED MILKSHAKE  ½ cup milk (or substitute)   1 Tbsp malted milk powder  ½ cup half and half  1 package instant breakfast  2 cups ice cream  2 Tbsp Ovaltine®  Put all ingredients into a  and   blend until smooth. Makes 2 servings.    STRAWBERRY CHEESECAKE SHAKE  (not sweet)  6 to 7 strawberries ½ cup cold   milk  ½ cup cottage cheese Honey to taste  Combine all ingredients in  until   smooth. 270 calories, 18 gm protein  Hint: Use frozen strawberries for a   thicker shake.    CREAMSICLE BREAKFAST SHAKE  ¾ cup vanilla or plain yogurt  ¾ cup orange juice  1 pack vanilla Instant Breakfast®  Add all ingredients to the , cover,   and blend until smooth.     FROZEN FRUIT SLUSH  6 oz can frozen fruit juice  4 Tbsp sugar  3 cups crushed ice  Combine all ingredients in  and   mix until slushy.     BREAKFAST SHAKE  6 oz. can frozen concentrated orange   juice   ¼ cup cold water  1 cup ice cubes  1 carton (8 oz.) plain yogurt  Combine all ingredients except ice cubes   in  and blend until frothy. With   mixture still running, drop in ice cubes   one at a time.   Makes 2 servings.  240 calories, 8 gm protein     HIGH PROTEIN FRUIT DRINK   8 oz Boost® Breeze/Ensure® Clear or   equivalent  ½ cup  sherbet  6 oz ginger-marifer  Add Boost® Breeze/Ensure® Clear and   sherbet to the , cover, and blend   until smooth. Gently stir in ginger-marifer.    JUICE SHAKE  ¾ cup pineapple juice (or other juices)  ¼ cup egg substitute (optional)  1-½ cups vanilla ice cream  Add all ingredients to the , cover,   and blend until smooth.   630 calories, 13 gm protein    GREEN SMOOTHIE  1 banana  ½ cup frozen augustus chunks  ½ ripe avocado  1 cup baby spinach  1¼ soy or almond milk  ½ tsp vanilla  Add all ingredients to the , cover,   and blend until smooth.   Makes 1 serving.  485 calories, 11 g protein    Soups  Add whole milk, evaporated milk, or heavy cream for extra calories  SUPER SOUP   10oz can of any cream soup 4 oz heavy cream  6 oz whole milk 4 Tbsp non-fat dry milk powder  Strain soup before serving.   Makes 2 servings. Provides 280 calories and 9 gm protein per serving.      Home Health Provider  Sometimes managing your health at home requires assistance.  The [facility] team has recognized your preference to use [facility].  They can be reached by phone at [phone].  The fax number for your reference is [phone].. A representative from the home health agency will contact you or your family to schedule your first visit.

## 2024-10-07 NOTE — PROGRESS NOTES
[facility]  Urology Progress Note    Mojgan Jefferson Patient Status:  Inpatient    1945 MRN [id number]   Location [facility] 3NE-A Attending Latoya Mcdonald*   Hosp Day # 2 PCP Guerrero Naik MD     Subjective:  Mojgan Jefferson is a(n) 78 year old female.    Current complaints: No abdominal/flank pain.  No dysuria or hematuria.  Voiding ok.  No fever.      Objective:  General appearance: alert, appears stated age, and cooperative  Blood pressure 154/67, pulse 102, temperature 98.6 °F (37 °C), temperature source Oral, resp. rate 20, weight 121 lb 14.4 oz (55.3 kg), SpO2 91%, not currently breastfeeding.  Lungs: non-labored respirations  Abdomen: soft, non-tender  No flank tenderness.    Lab Results   Component Value Date    K 3.4 10/06/2024        CT CHEST+ABDOMEN+PELVIS(CPT=71250/83403)    Result Date: 10/5/2024  CONCLUSION:   1. Masslike consolidative opacity peripherally within the right upper lobe (4.1 x 2.3 x 2.6 cm).  Given history of lung cancer, this could represent residual/recurrent malignancy, treatment related changes, or a combination there of.  2. There are multiple additional solid nodular opacities with irregular/spiculated margin throughout both lungs.  These are suspicious for multifocal metastases, differential including infectious/inflammatory process.  3. Mild concentric thickening of the mid/distal esophagus.  This may represent a component of mild esophagitis.  4. Mild hydronephrosis of the right collecting system.  Relative caliber transition noted at the UPJ.  No obstructing lesion or calculus identified.  5. Nodular thickening of the right adrenal gland, not appreciated in . This remains nonspecific.      LOCATION:  [id number]    Dictated by (CST): Tereza Roe MD on 10/05/2024 at 12:00 PM     Finalized by (CST): Tereza Roe MD on 10/05/2024 at 12:12 PM       XR CHEST AP PORTABLE  (CPT=71045)    Result Date: 10/5/2024  CONCLUSION:  1. Nodular opacity within the  right upper lobe.  CT is recommended to further evaluate. 2. Interstitial opacities bilaterally which may represent chronic interstitial changes versus mild edema. 3. Minimal left basilar atelectasis/infiltrates.    LOCATION:  [facility]      Dictated by (CST): Rosa Ferreira MD on 10/05/2024 at 10:55 AM     Finalized by (CST): Rosa Ferreira MD on 10/05/2024 at 10:56 AM         Assessment:    78 year old woman admitted with weakness who was found to have metabolic acidosis and PRINCESS. Creatinine has improved since admission with IV hydration. Urology was consulted because her CT Shows mild right hydronephrosis to the level of the UPJ     Afebrile  UA does not appear infectious  Serum creat 1.72 > 1.47    Plan:    Dr. Hatch discussed that there is no evidence of obstructing nephrolithiasis to explain her mild right hydronephrosis. We discussed that this is a non-specific finding.  Other potential etiologies include an anatomic variant, a congenital UPJ obstruction, extrinsic ureteral compression from malignancy or retroperitoneal fibrosis.  Given the mild degree of hydronephrosis, minimal symptoms, and improvement in PRINCESS with hydration, I would not recommend any additional testing or intervention at this point.     Nephrology following     Recommend outpatient follow up with a repeat renal ultrasound in 2-3 weeks to monitor for any worsening of her hydronephrosis. TE sent through [employer] system to help coordinate appt.      Will follow peripherally.    Above discussed with patient.    GM [facility]  Department of Urology  10/7/2024  6:39 AM

## 2024-10-07 NOTE — PLAN OF CARE
Assumed care at 1930. A&O X4, 1L NC, VSS, NSR/ST with ambulation on tele. Non cardiac electrolyte replacement protocol ordered, nephrology on consult. Lab draw. PIVs in R forearm and L forearm flushed and capped. Dressings CDI. Bicarb gtt 100 mL/hr. On eliquis. Regular diet, pills whole with thin. Continent, up SBA, last BM 10/6. Denies pain. Daily weight.     2115: potassium redraw 3.4. Nephro paged, order received to replace per protocol.     Problem: METABOLIC/FLUID AND ELECTROLYTES - ADULT  Goal: Electrolytes maintained within normal limits  Description: INTERVENTIONS:  - Monitor labs and rhythm and assess patient for signs and symptoms of electrolyte imbalances  - Administer electrolyte replacement as ordered  - Monitor response to electrolyte replacements, including rhythm and repeat lab results as appropriate  - Fluid restriction as ordered  - Instruct patient on fluid and nutrition restrictions as appropriate  Outcome: Progressing  Goal: Hemodynamic stability and optimal renal function maintained  Description: INTERVENTIONS:  - Monitor labs and assess for signs and symptoms of volume excess or deficit  - Monitor intake, output and patient weight  - Monitor urine specific gravity, serum osmolarity and serum sodium as indicated or ordered  - Monitor response to interventions for patient's volume status, including labs, urine output, blood pressure (other measures as available)  - Encourage oral intake as appropriate  - Instruct patient on fluid and nutrition restrictions as appropriate  Outcome: Progressing

## 2024-10-07 NOTE — DIETARY NOTE
[facility]   part of [facility]    NUTRITION ASSESSMENT    Pt does not meet malnutrition criteria at this time.    NUTRITION INTERVENTION:    RD nutrition Care Plan- See RD nutrition assessment for additional recommendations  Meal and Snacks - Monitor and encourage adequate PO intake.   Medical Food Supplements - Pt not interested at this time. Rationale/use for oral supplements discussed.  Nutrition Education - Discussed importance of adequate nutrition and use of liquid nutrition to help meet nutritional needs. Pt/family receptive to education, no barriers noted. Handouts provided in AVS    PATIENT STATUS: 10/07/24 78/F admitted with metabolic acidosis. PMH includes HTN, GERD, HF, COPD, Lung Ca on immunotherapy.  PT seen for MST.  Pt lying in bed, family at bedside.  PT reports poor appetite PTA x 2-3 weeks associated with n/v and alternating constipation & diarrhea.  Currently she feels well, that her appetite is improving.  She was able to eat some coffeecake and fruit this AM.  Only ate some soup last night.  No current n/v.  Last BM 10/6 (Formed).  + wt loss per EMR review 8 # / 6% x 3 mo.  PT has protein shakes at home, but can't make herself drink them. Discussed alternative high calorie beverages, or use of recipes to help incorporate shakes into smoothies or shakes. Answered all questions at time of visit. Offered ONS, pt adamantly declined all offers. Advised pt to reach out if changes to intake.       ANTHROPOMETRICS:  Ht:  5'1\"  Wt: 55.3 kg (121 lb 14.4 oz).   BMI: Body mass index is 23.03 kg/m².  IBW: 47.7 kg      WEIGHT HISTORY:   Weight loss: Yes, Non-severe Wt loss of 8 lbs, 6%, over 3 months     Wt Readings from Last 10 Encounters:   10/07/24 55.3 kg (121 lb 14.4 oz)   04/09/24 62.1 kg (137 lb)   03/15/24 61.5 kg (135 lb 9.6 oz)   09/08/21 62.4 kg (137 lb 9.6 oz)   09/03/20 61.5 kg (135 lb 9.6 oz)   09/09/19 62.1 kg (137 lb)   08/30/19 60.6 kg (133 lb 9.6 oz)   08/29/18 60.3 kg (133 lb)    08/28/17 61.1 kg (134 lb 9.6 oz)   08/26/16 59.9 kg (132 lb)        NUTRITION:  Diet:       Procedures    Regular/General diet Is Patient on Accuchecks? No      Food Allergies: No  Cultural/Ethnic/Jehovah's witness Preferences Addressed: Yes    Percent Meals Eaten (last 3 days)       Date/Time Percent Meals Eaten (%)    10/06/24 1200 90 %    10/06/24 1859 100 %            GI system review: WNL Last BM Date: 10/06/24  Skin and wounds: intact    NUTRITION RELATED PHYSICAL FINDINGS:     1. Body Fat/Muscle Mass: unable to assess, pt declined exam at this time     2. Fluid Accumulation: none per RN documentation    NUTRITION PRESCRIPTION:  55.3kg Actual Body Weight  Calories: 5490-4968 calories/day (30-35 kcal/kg)  Protein: 66-83 grams protein/day (1.2-1.5 grams protein per kg)  Fluid: ~1 ml/kcal or per MD discretion    NUTRITION DIAGNOSIS/PROBLEM:  Inadequate oral intake related to insufficient appetite resulting in inadequate nutrition intake as evidenced by documented/reported insufficient oral intake      MONITOR AND EVALUATE/NUTRITION GOALS:  PO intake of 75% of meals TID - New  Weight stable within 1 to 2 lbs during admission - New      MEDICATIONS:  Kphos 15mmol, Kcl 20meq, Lrs @ 75ml/hr    LABS:  Na 134; K 3.4; Phos 1.7    Pt is at Moderate nutrition risk      Nhung Marsh RD, LDN, [facility]  Clinical Dietitian  Phone [phone]

## 2024-10-07 NOTE — PROGRESS NOTES
[facility]   part of [facility]    Nephrology Progress Note    Mojgan Jefferson Attending:  Latoya Mcdonald*     Cc: donaldo, acidosis    SUBJECTIVE     No acute complaints. Still with some weakness.    PHYSICAL EXAM   Vital signs: /67 (BP Location: Left arm)   Pulse 102   Temp 97.8 °F (36.6 °C) (Oral)   Resp 18   Wt 121 lb 14.4 oz (55.3 kg)   SpO2 94%   BMI 23.03 kg/m²   Temp (24hrs), Av.6 °F (37 °C), Min:97.8 °F (36.6 °C), Max:99.5 °F (37.5 °C)       Intake/Output Summary (Last 24 hours) at 10/7/2024 1440  Last data filed at 10/7/2024 1100  Gross per 24 hour   Intake 3484.1 ml   Output 2 ml   Net 3482.1 ml     Wt Readings from Last 3 Encounters:   10/07/24 121 lb 14.4 oz (55.3 kg)   24 137 lb (62.1 kg)   03/15/24 135 lb 9.6 oz (61.5 kg)     General: NAD  HEENT: NCAT  Cardiac: RRR  Lungs: no distress  Abdomen: soft, non-tender  Extremities: no edema  Neurologic: awake, alert  Skin: warm and dry     MEDS     Current Facility-Administered Medications   Medication Dose Route Frequency    lactated ringers infusion   Intravenous Continuous    [START ON 10/8/2024] pantoprazole (Protonix) DR tab 40 mg  40 mg Oral QAM AC    apixaban (Eliquis) tab 5 mg  5 mg Oral BID    acetaminophen (Tylenol Extra Strength) tab 500 mg  500 mg Oral Q4H PRN    melatonin tab 3 mg  3 mg Oral Nightly PRN    polyethylene glycol (PEG 3350) (Miralax) 17 g oral packet 17 g  17 g Oral Daily PRN    sennosides (Senokot) tab 17.2 mg  17.2 mg Oral Nightly PRN    bisacodyl (Dulcolax) 10 MG rectal suppository 10 mg  10 mg Rectal Daily PRN    fleet enema (Fleet) rectal enema 133 mL  1 enema Rectal Once PRN    ondansetron (Zofran) 4 MG/2ML injection 4 mg  4 mg Intravenous Q6H PRN    metoclopramide (Reglan) 5 mg/mL injection 5 mg  5 mg Intravenous Q8H PRN    magnesium oxide (Mag-Ox) tab 400 mg  400 mg Oral Once    albuterol (Ventolin HFA) 108 (90 Base) MCG/ACT inhaler 1 puff  1 puff Inhalation Q6H PRN    aspirin chewable  tab 81 mg  81 mg Oral Daily    rosuvastatin (Crestor) tab 5 mg  5 mg Oral Nightly    traMADol (Ultram) tab 50 mg  50 mg Oral Q12H PRN    hydrOXYzine (Atarax) tab 25 mg  25 mg Oral TID PRN       LABS     Lab Results   Component Value Date    WBC 7.1 10/07/2024    HGB 8.7 10/07/2024    HCT 24.2 10/07/2024    .0 10/07/2024    CREATSERUM 1.07 10/07/2024    BUN 16 10/07/2024     10/07/2024    K 3.4 10/07/2024    K 3.4 10/07/2024     10/07/2024    CO2 27.0 10/07/2024     10/07/2024    CA 8.1 10/07/2024    ALB 3.2 10/07/2024    MG 1.6 10/07/2024    PHOS 1.7 10/07/2024    PHOS 1.7 10/07/2024       IMAGING     Imaging studies personally reviewed.    ASSESSMENT & PLAN     78 year old female ho metastatic lung cancer on immunotherapy, PE on eliquis, COPD, HTN, HL, GERD who presents for vomiting and weight loss. Nephrology consulted for PRINCESS and acidosis     PRINCESS   -- likely 2/2 prerenal + nsaids, in the setting of hypotension/lasix/lisinopril. Unclear if her immunotherapy contributing  -- hold home lasix and lisinopril   -- CT w mild R hydro, urology consulted in setting of PRINCESS to ensure/eval, no intervention planned  -- continue IVFs for now, will change to LR  -- avoid nephrotoxins and renally dose meds      Acidosis  -- Improved s/p IVF  -- LR as above     Hyponatremia  -- improving    Hypokalemia  -- replace    Hypophosphatemia  -- replace     Anemia  -- transfusion prn      Hypertension  -- can add back home medications as needed (lisinopril, metoprolol).   -- hold home furosemide at this time       Thank you for allowing me to participate in the care of this patient. Please do not hesitate to call with questions or concerns.     Jyothi Castro, DO  [facility] - Nephrology

## 2024-10-08 ENCOUNTER — APPOINTMENT (OUTPATIENT)
Dept: CARDIAC REHAB | Facility: HOSPITAL | Age: 79
End: 2024-10-08
Attending: INTERNAL MEDICINE
Payer: MEDICARE

## 2024-10-08 VITALS
HEART RATE: 89 BPM | DIASTOLIC BLOOD PRESSURE: 59 MMHG | OXYGEN SATURATION: 97 % | RESPIRATION RATE: 19 BRPM | SYSTOLIC BLOOD PRESSURE: 119 MMHG | TEMPERATURE: 98 F | WEIGHT: 118.63 LBS | BODY MASS INDEX: 22 KG/M2

## 2024-10-08 LAB
ALBUMIN SERPL-MCNC: 3.1 G/DL (ref 3.2–4.8)
ANION GAP SERPL CALC-SCNC: 4 MMOL/L (ref 0–18)
BASOPHILS # BLD AUTO: 0.04 X10(3) UL (ref 0–0.2)
BASOPHILS NFR BLD AUTO: 0.5 %
BUN BLD-MCNC: 10 MG/DL (ref 9–23)
CALCIUM BLD-MCNC: 8.2 MG/DL (ref 8.7–10.4)
CHLORIDE SERPL-SCNC: 100 MMOL/L (ref 98–112)
CO2 SERPL-SCNC: 29 MMOL/L (ref 21–32)
CREAT BLD-MCNC: 0.95 MG/DL
EGFRCR SERPLBLD CKD-EPI 2021: 61 ML/MIN/1.73M2 (ref 60–?)
EOSINOPHIL # BLD AUTO: 0.6 X10(3) UL (ref 0–0.7)
EOSINOPHIL NFR BLD AUTO: 8.2 %
ERYTHROCYTE [DISTWIDTH] IN BLOOD BY AUTOMATED COUNT: 13.4 %
GLUCOSE BLD-MCNC: 115 MG/DL (ref 70–99)
HCT VFR BLD AUTO: 23.9 %
HGB BLD-MCNC: 8.1 G/DL
IMM GRANULOCYTES # BLD AUTO: 0.02 X10(3) UL (ref 0–1)
IMM GRANULOCYTES NFR BLD: 0.3 %
LYMPHOCYTES # BLD AUTO: 0.72 X10(3) UL (ref 1–4)
LYMPHOCYTES NFR BLD AUTO: 9.9 %
MAGNESIUM SERPL-MCNC: 1.4 MG/DL (ref 1.6–2.6)
MCH RBC QN AUTO: 30.3 PG (ref 26–34)
MCHC RBC AUTO-ENTMCNC: 33.9 G/DL (ref 31–37)
MCV RBC AUTO: 89.5 FL
MONOCYTES # BLD AUTO: 0.77 X10(3) UL (ref 0.1–1)
MONOCYTES NFR BLD AUTO: 10.5 %
NEUTROPHILS # BLD AUTO: 5.15 X10 (3) UL (ref 1.5–7.7)
NEUTROPHILS # BLD AUTO: 5.15 X10(3) UL (ref 1.5–7.7)
NEUTROPHILS NFR BLD AUTO: 70.6 %
OSMOLALITY SERPL CALC.SUM OF ELEC: 276 MOSM/KG (ref 275–295)
PHOSPHATE SERPL-MCNC: 3.2 MG/DL (ref 2.4–5.1)
PHOSPHATE SERPL-MCNC: 3.2 MG/DL (ref 2.4–5.1)
PLATELET # BLD AUTO: 216 10(3)UL (ref 150–450)
POTASSIUM SERPL-SCNC: 3.4 MMOL/L (ref 3.5–5.1)
RBC # BLD AUTO: 2.67 X10(6)UL
SODIUM SERPL-SCNC: 133 MMOL/L (ref 136–145)
WBC # BLD AUTO: 7.3 X10(3) UL (ref 4–11)

## 2024-10-08 RX ORDER — POTASSIUM CHLORIDE 1500 MG/1
40 TABLET, EXTENDED RELEASE ORAL ONCE
Status: COMPLETED | OUTPATIENT
Start: 2024-10-08 | End: 2024-10-08

## 2024-10-08 RX ORDER — HYDROXYZINE HYDROCHLORIDE 25 MG/1
25 TABLET, FILM COATED ORAL 3 TIMES DAILY PRN
Qty: 20 TABLET | Refills: 0 | Status: SHIPPED | OUTPATIENT
Start: 2024-10-08

## 2024-10-08 RX ORDER — MAGNESIUM OXIDE 400 MG/1
400 TABLET ORAL ONCE
Status: COMPLETED | OUTPATIENT
Start: 2024-10-08 | End: 2024-10-08

## 2024-10-08 RX ORDER — LISINOPRIL 20 MG/1
10 TABLET ORAL DAILY
Status: SHIPPED | COMMUNITY
Start: 2024-10-08

## 2024-10-08 NOTE — CM/SW NOTE
10/08/24 1100   CM/SW Referral Data   Referral Source    Reason for Referral Discharge planning   Informant Patient;Daughter   Patient Info   Patient's Current Mental Status at Time of Assessment Alert;Oriented   Patient's Home Environment House   Patient lives with Alone   Patient Status Prior to Admission   Independent with ADLs and Mobility Yes   Services in place prior to admission DME/Supplies at home   DME Provider/Supplier [facility]   Type of DME/Supplies Home Oxygen   Discharge Needs   Anticipated D/C needs Home health care   Choice of Post-Acute Provider   Informed patient of right to choose their preferred provider Yes     CM self referred to case for discharge planning.    Pt is a 78 year old female admitted with vomiting and dehydration.  Pt w/hx metastatic lung cancer on immunotherapy.    Met w/pt and her daughter at the bedside for eval.  She reports she lives alone and is independent with all ADLs/IADLs.  She has Home O2 from [employer] which she only uses prn when ambulating long distances or exercising.     Pt and daughter report she will discharge to daughter's home and daughter feels pt is very weak and could benefit from HH PT which she has had in the past.  Pt agreeable to referrals  Referral sent in Aidin and will provide list when available.    Daughter's address:  [address] [place] [address]  [place] [address]    / to remain available for support and/or discharge planning.     Talia REEDERA MSN, RN CTL/  [id number]

## 2024-10-08 NOTE — PROGRESS NOTES
[facility]  Hospitalist Progress Note                                                                     [facility]   [facility] [place]  12/21/1945    SUBJECTIVE:  Seen and examined at bedside.  Patient still feeling extremely weak.  Daughter is at bedside.  Had a long discussion.  Patient's oncologist is [facility], appointment is tomorrow for chemotherapy.  It is not advisable at this point to receive chemotherapy tomorrow as patient is very weak and not tolerating much oral intake.      OBJECTIVE:  Temp:  [97.8 °F (36.6 °C)-99.1 °F (37.3 °C)] 99.1 °F (37.3 °C)  Pulse:  [] 105  Resp:  [18-20] 18  BP: (133-154)/(55-73) 141/73  SpO2:  [90 %-96 %] 90 %  Exam  Gen: No acute distress, alert and oriented x3, no focal neurologic deficits, feels weak  Pulm: Lungs clear bilaterally, normal respiratory effort  CV: Heart with regular rate and rhythm, no murmur.  Normal PMI.    Abd: Abdomen soft, nontender, nondistended, no organomegaly, bowel sounds present  MSK: Full range of motion in extremities, no clubbing, no cyanosis  Skin: no rashes or lesions    Labs:   Recent Labs   Lab 10/05/24  0916 10/06/24  0524 10/07/24  0651   WBC 14.6* 8.5 7.1   HGB 11.6* 9.4* 8.7*   MCV 89.0 86.7 85.5   .0 260.0 234.0       Recent Labs   Lab 10/05/24  0916 10/05/24  1941 10/06/24  0524 10/06/24  1922 10/07/24  0651 10/07/24  1929   * 134* 137  137  --  134*  --    K 3.6 2.8* 3.0*  3.0* 3.4* 3.4*  3.4* 3.8    109 110  110  --  101  --    CO2 11.0* 14.0* 18.0*  18.0*  --  27.0  --    BUN 41* 35* 31*  31*  --  16  --    CREATSERUM 2.27* 1.72* 1.47*  1.47*  --  1.07*  --    CA 9.5 8.8 8.5*  8.5*  --  8.1*  --    MG 1.8 1.5* 1.5*  --  1.6  --    PHOS  --  3.5 2.4  --  1.7*  1.7*  --    * 138* 141*  141*  --  124*  --        Recent Labs   Lab 10/05/24  0916 10/05/24  1941 10/06/24  0524 10/07/24  0651    ALT 14  --  10  --    AST 17  --  11  --    ALB 4.6 3.4 3.5  3.5 3.2       No results for input(s): \"PGLU\" in the last 168 hours.    Meds:   Scheduled:    [START ON 10/8/2024] pantoprazole  40 mg Oral QAM AC    apixaban  5 mg Oral BID    magnesium oxide  400 mg Oral Once    aspirin  81 mg Oral Daily    rosuvastatin  5 mg Oral Nightly     Continuous Infusions:    lactated ringers 75 mL/hr at 10/07/24 1035     PRN:   acetaminophen    melatonin    polyethylene glycol (PEG 3350)    sennosides    bisacodyl    fleet enema    ondansetron    metoclopramide    albuterol    traMADol    hydrOXYzine    Assessment/Plan:  Principal Problem:    Metabolic acidosis  Active Problems:    PRINCESS (acute kidney injury) (HCC)    Primary malignant neoplasm of lung metastatic to other site, unspecified laterality (HCC)    78 year old female with PMH sig for metastatic lung cancer on immunotherapy, hx PE on eliquis, COPD, HFpEF, PFO, PAD s/p stents, HTN, HL, GERD p/w vomiting and dehydration.      Acute Renal Injury with acidosis- improving  - suspect prerenal from poor po intake and GI volume losses along with nsaids and lasix/lisinopril  - ABG with Acidosis, should improve with alkalotic fluids  - hold lasix/ lisinopril, dc nsaids  - avoid nephrotoxins  - monitor renal parameters  - nephro following, discussed     L hydronephrosis  - urology consulted, outpatient surveillance      Nausea/vomiting - stable  - side effect of immunotherapy vs. Tumor progression?   - supportive cares  - prn antiemetics      Mild Esophagitis  - cont PPI, resumed   - discussed findings of CT scan    Metastatic Lung cancer, RUL mass  Stage 4 adenocarcinoma  Brain mets s/p radiation tx  - on immunotherapy (trial of artemide)  - follows [facility], appt Tuesday - likely needs to be rescheduled     RUL PE  - eliquis     COPD/Emphysema  - home inhalers     PAD s/p stents  - asa, statin     Essential HTN  - hold lisinopril for PRINCESS     Hyperlipidemia  - statin      GERD  - ppi      FEN: regular diet, PT/OT  Proph: SCDs, eliquis  Code status: full code    Dispo - JAMEL 1-2 days,     Hemant Eddy DO  [facility]

## 2024-10-08 NOTE — HOME CARE LIAISON
Received referral via [facility] for Home Health services. Spoke w/ patient and daughter who is agreeable with [facility]. Contact information placed on AVS.

## 2024-10-08 NOTE — PROGRESS NOTES
NURSING DISCHARGE NOTE    Discharged Home via Wheelchair.  Accompanied by Family member and Support staff  Belongings Taken by patient/family.    AVS printed and reviewed with patient.  PIV removed.  Need for follow up appointments reinforced.  Patient discharged in stable condition.

## 2024-10-08 NOTE — PLAN OF CARE
Assumed patient care at 0730. Alert and oriented x4. Room air during the day. 1L nasal cannula through the night. Normal sinus rhythm on tele. Electrolyte protocol. Potassium PO. Regular diet. Daily weights. Up ad jess. LR @75 right wrist. Nephro monitoring labs. All safety precautions are in place and will continue to follow the care plan.       Problem: METABOLIC/FLUID AND ELECTROLYTES - ADULT  Goal: Electrolytes maintained within normal limits  Description: INTERVENTIONS:  - Monitor labs and rhythm and assess patient for signs and symptoms of electrolyte imbalances  - Administer electrolyte replacement as ordered  - Monitor response to electrolyte replacements, including rhythm and repeat lab results as appropriate  - Fluid restriction as ordered  - Instruct patient on fluid and nutrition restrictions as appropriate  Outcome: Progressing  Goal: Hemodynamic stability and optimal renal function maintained  Description: INTERVENTIONS:  - Monitor labs and assess for signs and symptoms of volume excess or deficit  - Monitor intake, output and patient weight  - Monitor urine specific gravity, serum osmolarity and serum sodium as indicated or ordered  - Monitor response to interventions for patient's volume status, including labs, urine output, blood pressure (other measures as available)  - Encourage oral intake as appropriate  - Instruct patient on fluid and nutrition restrictions as appropriate  Outcome: Progressing

## 2024-10-08 NOTE — CM/SW NOTE
10/08/24 3176   Choice of Post-Acute Provider   Informed patient of right to choose their preferred provider Yes   List of appropriate post-acute services provided to patient/family with quality data Yes   Patient/family choice [facility]     Met w/pt and her daughter with list of available HH.  Pt chose [facility].  Notified liaison Bertha of choice and of discharge today    / to remain available for support and/or discharge planning.     Talia REEDERA MSN, RN CTL/  [id number]

## 2024-10-08 NOTE — PROGRESS NOTES
[facility]   part of [facility]    Nephrology Progress Note    Mojgan Jefferson Attending:  Latoya Mcdonald*     Cc: donaldo, acidosis    SUBJECTIVE     No acute complaints.    PHYSICAL EXAM   Vital signs: /59 (BP Location: Left arm)   Pulse 89   Temp 98.3 °F (36.8 °C) (Oral)   Resp 19   Wt 118 lb 9.6 oz (53.8 kg)   SpO2 97%   BMI 22.41 kg/m²   Temp (24hrs), Av.8 °F (37.1 °C), Min:98.3 °F (36.8 °C), Max:99.4 °F (37.4 °C)       Intake/Output Summary (Last 24 hours) at 10/8/2024 1345  Last data filed at 10/8/2024 1000  Gross per 24 hour   Intake 1485 ml   Output 5 ml   Net 1480 ml     Wt Readings from Last 3 Encounters:   10/08/24 118 lb 9.6 oz (53.8 kg)   24 137 lb (62.1 kg)   03/15/24 135 lb 9.6 oz (61.5 kg)     General: NAD  HEENT: NCAT  Cardiac: RRR  Lungs: no distress  Abdomen: soft, non-tender  Extremities: no edema  Neurologic: awake, alert  Skin: warm and dry     MEDS     Current Facility-Administered Medications   Medication Dose Route Frequency    lactated ringers infusion   Intravenous Continuous    pantoprazole (Protonix) DR tab 40 mg  40 mg Oral QAM AC    apixaban (Eliquis) tab 5 mg  5 mg Oral BID    acetaminophen (Tylenol Extra Strength) tab 500 mg  500 mg Oral Q4H PRN    melatonin tab 3 mg  3 mg Oral Nightly PRN    polyethylene glycol (PEG 3350) (Miralax) 17 g oral packet 17 g  17 g Oral Daily PRN    sennosides (Senokot) tab 17.2 mg  17.2 mg Oral Nightly PRN    bisacodyl (Dulcolax) 10 MG rectal suppository 10 mg  10 mg Rectal Daily PRN    fleet enema (Fleet) rectal enema 133 mL  1 enema Rectal Once PRN    ondansetron (Zofran) 4 MG/2ML injection 4 mg  4 mg Intravenous Q6H PRN    metoclopramide (Reglan) 5 mg/mL injection 5 mg  5 mg Intravenous Q8H PRN    albuterol (Ventolin HFA) 108 (90 Base) MCG/ACT inhaler 1 puff  1 puff Inhalation Q6H PRN    aspirin chewable tab 81 mg  81 mg Oral Daily    rosuvastatin (Crestor) tab 5 mg  5 mg Oral Nightly    traMADol (Ultram) tab 50  mg  50 mg Oral Q12H PRN    hydrOXYzine (Atarax) tab 25 mg  25 mg Oral TID PRN       LABS     Lab Results   Component Value Date    WBC 7.3 10/08/2024    HGB 8.1 10/08/2024    HCT 23.9 10/08/2024    .0 10/08/2024    CREATSERUM 0.95 10/08/2024    BUN 10 10/08/2024     10/08/2024    K 3.4 10/08/2024     10/08/2024    CO2 29.0 10/08/2024     10/08/2024    CA 8.2 10/08/2024    ALB 3.1 10/08/2024    MG 1.4 10/08/2024    PHOS 3.2 10/08/2024    PHOS 3.2 10/08/2024       IMAGING     Imaging studies personally reviewed.    ASSESSMENT & PLAN     78 year old female ho metastatic lung cancer on immunotherapy, PE on eliquis, COPD, HTN, HL, GERD who presents for vomiting and weight loss. Nephrology consulted for PRINCESS and acidosis     PRINCESS - improved  -- Likely 2/2 prerenal + nsaids, in the setting of hypotension/lasix/lisinopril. Unclear if her immunotherapy contributing  -- Okay to resume home lisinopril if needed for BP control  -- CT w mild R hydro, urology consulted in setting of PRINCESS to ensure/eval, no intervention planned  -- Stop IVF, encourage PO intake  -- avoid nephrotoxins and renally dose meds      Acidosis  -- Improved s/p IVF  -- LR as above     Hyponatremia  -- improving    Hypokalemia  -- replace    Hypophosphatemia  -- replace    Hypomagnesemia  -- replace     Anemia  -- transfusion prn      Hypertension  -- can add back home medications as needed (lisinopril, metoprolol).   -- hold home furosemide at this time      Okay for discharge from nephrology perspective       Thank you for allowing me to participate in the care of this patient. Please do not hesitate to call with questions or concerns.     Jyothi Castro, DO  [facility] - Nephrology

## 2024-10-10 ENCOUNTER — APPOINTMENT (OUTPATIENT)
Dept: CARDIAC REHAB | Facility: HOSPITAL | Age: 79
End: 2024-10-10
Attending: INTERNAL MEDICINE
Payer: MEDICARE

## 2024-10-15 ENCOUNTER — APPOINTMENT (OUTPATIENT)
Dept: CARDIAC REHAB | Facility: HOSPITAL | Age: 79
End: 2024-10-15
Attending: INTERNAL MEDICINE
Payer: MEDICARE

## 2024-10-17 ENCOUNTER — APPOINTMENT (OUTPATIENT)
Dept: CARDIAC REHAB | Facility: HOSPITAL | Age: 79
End: 2024-10-17
Attending: INTERNAL MEDICINE
Payer: MEDICARE

## 2024-10-21 ENCOUNTER — APPOINTMENT (OUTPATIENT)
Dept: CARDIOLOGY | Age: 79
End: 2024-10-21

## 2024-10-21 VITALS
HEIGHT: 61 IN | SYSTOLIC BLOOD PRESSURE: 110 MMHG | OXYGEN SATURATION: 99 % | BODY MASS INDEX: 22.79 KG/M2 | HEART RATE: 72 BPM | WEIGHT: 120.7 LBS | DIASTOLIC BLOOD PRESSURE: 58 MMHG | RESPIRATION RATE: 18 BRPM

## 2024-10-21 DIAGNOSIS — Z82.49 FAMILY HISTORY OF CORONARY ARTERIOSCLEROSIS: ICD-10-CM

## 2024-10-21 DIAGNOSIS — I73.9 CLAUDICATION (CMD): ICD-10-CM

## 2024-10-21 DIAGNOSIS — R09.89 CAROTID BRUIT, UNSPECIFIED LATERALITY: Primary | ICD-10-CM

## 2024-10-21 DIAGNOSIS — E78.2 HYPERLIPIDEMIA, MIXED: ICD-10-CM

## 2024-10-21 DIAGNOSIS — I25.10 CAD IN NATIVE ARTERY: ICD-10-CM

## 2024-10-21 DIAGNOSIS — I50.32 CHRONIC CONGESTIVE HEART FAILURE WITH LEFT VENTRICULAR DIASTOLIC DYSFUNCTION  (CMD): ICD-10-CM

## 2024-10-21 DIAGNOSIS — Z86.711 HISTORY OF PULMONARY EMBOLISM: ICD-10-CM

## 2024-10-21 DIAGNOSIS — I73.9 PAD (PERIPHERAL ARTERY DISEASE) (CMD): ICD-10-CM

## 2024-10-21 PROCEDURE — 99214 OFFICE O/P EST MOD 30 MIN: CPT | Performed by: INTERNAL MEDICINE

## 2024-10-21 SDOH — HEALTH STABILITY: PHYSICAL HEALTH: ON AVERAGE, HOW MANY DAYS PER WEEK DO YOU ENGAGE IN MODERATE TO STRENUOUS EXERCISE (LIKE A BRISK WALK)?: 0 DAYS

## 2024-10-21 SDOH — HEALTH STABILITY: PHYSICAL HEALTH: ON AVERAGE, HOW MANY MINUTES DO YOU ENGAGE IN EXERCISE AT THIS LEVEL?: 0 MIN

## 2024-10-21 ASSESSMENT — PATIENT HEALTH QUESTIONNAIRE - PHQ9
CLINICAL INTERPRETATION OF PHQ2 SCORE: NO FURTHER SCREENING NEEDED
SUM OF ALL RESPONSES TO PHQ9 QUESTIONS 1 AND 2: 2
SUM OF ALL RESPONSES TO PHQ9 QUESTIONS 1 AND 2: 2
1. LITTLE INTEREST OR PLEASURE IN DOING THINGS: SEVERAL DAYS
2. FEELING DOWN, DEPRESSED OR HOPELESS: SEVERAL DAYS

## 2024-10-22 ENCOUNTER — APPOINTMENT (OUTPATIENT)
Dept: CARDIAC REHAB | Facility: HOSPITAL | Age: 79
End: 2024-10-22
Attending: INTERNAL MEDICINE
Payer: MEDICARE

## 2024-10-24 ENCOUNTER — APPOINTMENT (OUTPATIENT)
Dept: CARDIAC REHAB | Facility: HOSPITAL | Age: 79
End: 2024-10-24
Attending: INTERNAL MEDICINE
Payer: MEDICARE

## 2024-10-29 ENCOUNTER — APPOINTMENT (OUTPATIENT)
Dept: CARDIAC REHAB | Facility: HOSPITAL | Age: 79
End: 2024-10-29
Attending: INTERNAL MEDICINE
Payer: MEDICARE

## 2024-10-31 ENCOUNTER — APPOINTMENT (OUTPATIENT)
Dept: CARDIAC REHAB | Facility: HOSPITAL | Age: 79
End: 2024-10-31
Attending: INTERNAL MEDICINE
Payer: MEDICARE

## 2024-11-05 ENCOUNTER — APPOINTMENT (OUTPATIENT)
Dept: CARDIAC REHAB | Facility: HOSPITAL | Age: 79
End: 2024-11-05
Attending: INTERNAL MEDICINE
Payer: MEDICARE

## 2024-11-07 ENCOUNTER — APPOINTMENT (OUTPATIENT)
Dept: CARDIAC REHAB | Facility: HOSPITAL | Age: 79
End: 2024-11-07
Attending: INTERNAL MEDICINE
Payer: MEDICARE

## 2024-11-12 ENCOUNTER — APPOINTMENT (OUTPATIENT)
Dept: CARDIAC REHAB | Facility: HOSPITAL | Age: 79
End: 2024-11-12
Attending: INTERNAL MEDICINE
Payer: MEDICARE

## 2024-11-14 ENCOUNTER — APPOINTMENT (OUTPATIENT)
Dept: CARDIAC REHAB | Facility: HOSPITAL | Age: 79
End: 2024-11-14
Attending: INTERNAL MEDICINE
Payer: MEDICARE

## 2024-11-19 ENCOUNTER — APPOINTMENT (OUTPATIENT)
Dept: GENERAL RADIOLOGY | Facility: HOSPITAL | Age: 79
DRG: 522 | End: 2024-11-19
Attending: EMERGENCY MEDICINE
Payer: MEDICARE

## 2024-11-19 ENCOUNTER — APPOINTMENT (OUTPATIENT)
Dept: GENERAL RADIOLOGY | Facility: HOSPITAL | Age: 79
End: 2024-11-19
Payer: MEDICARE

## 2024-11-19 ENCOUNTER — ANESTHESIA (OUTPATIENT)
Dept: EMERGENCY DEPT | Facility: HOSPITAL | Age: 79
End: 2024-11-19
Payer: MEDICARE

## 2024-11-19 ENCOUNTER — APPOINTMENT (OUTPATIENT)
Dept: GENERAL RADIOLOGY | Facility: HOSPITAL | Age: 79
End: 2024-11-19
Attending: EMERGENCY MEDICINE
Payer: MEDICARE

## 2024-11-19 ENCOUNTER — ANESTHESIA EVENT (OUTPATIENT)
Dept: EMERGENCY DEPT | Facility: HOSPITAL | Age: 79
End: 2024-11-19
Payer: MEDICARE

## 2024-11-19 ENCOUNTER — APPOINTMENT (OUTPATIENT)
Dept: GENERAL RADIOLOGY | Facility: HOSPITAL | Age: 79
DRG: 522 | End: 2024-11-19
Payer: MEDICARE

## 2024-11-19 ENCOUNTER — HOSPITAL ENCOUNTER (INPATIENT)
Facility: HOSPITAL | Age: 79
LOS: 4 days | Discharge: HOME | End: 2024-11-23
Attending: EMERGENCY MEDICINE | Admitting: HOSPITALIST
Payer: MEDICARE

## 2024-11-19 ENCOUNTER — APPOINTMENT (OUTPATIENT)
Dept: CARDIAC REHAB | Facility: HOSPITAL | Age: 79
End: 2024-11-19
Attending: INTERNAL MEDICINE
Payer: MEDICARE

## 2024-11-19 ENCOUNTER — ANESTHESIA EVENT (OUTPATIENT)
Dept: SURGERY | Facility: HOSPITAL | Age: 79
End: 2024-11-19
Payer: MEDICARE

## 2024-11-19 ENCOUNTER — HOSPITAL ENCOUNTER (INPATIENT)
Facility: HOSPITAL | Age: 79
LOS: 4 days | Discharge: HOME HEALTH | DRG: 522 | End: 2024-11-23
Attending: EMERGENCY MEDICINE | Admitting: HOSPITALIST
Payer: MEDICARE

## 2024-11-19 DIAGNOSIS — S72.001A CLOSED FRACTURE OF RIGHT HIP, INITIAL ENCOUNTER (HCC): Primary | ICD-10-CM

## 2024-11-19 DIAGNOSIS — S72.001A DISPLACED FRACTURE OF RIGHT FEMORAL NECK (HCC): ICD-10-CM

## 2024-11-19 LAB
ALBUMIN SERPL-MCNC: 4.6 G/DL (ref 3.2–4.8)
ALBUMIN/GLOB SERPL: 2.1 {RATIO} (ref 1–2)
ALP LIVER SERPL-CCNC: 97 U/L
ALT SERPL-CCNC: 39 U/L
ANION GAP SERPL CALC-SCNC: 6 MMOL/L (ref 0–18)
APTT PPP: 25.3 SECONDS (ref 23–36)
AST SERPL-CCNC: 26 U/L (ref ?–34)
BASOPHILS # BLD AUTO: 0.04 X10(3) UL (ref 0–0.2)
BASOPHILS NFR BLD AUTO: 0.2 %
BILIRUB SERPL-MCNC: 0.7 MG/DL (ref 0.2–1.1)
BILIRUB UR QL STRIP.AUTO: NEGATIVE
BUN BLD-MCNC: 18 MG/DL (ref 9–23)
CALCIUM BLD-MCNC: 9.5 MG/DL (ref 8.7–10.4)
CHLORIDE SERPL-SCNC: 105 MMOL/L (ref 98–112)
CLARITY UR REFRACT.AUTO: CLEAR
CO2 SERPL-SCNC: 24 MMOL/L (ref 21–32)
COLOR UR AUTO: COLORLESS
CREAT BLD-MCNC: 1.1 MG/DL
EGFRCR SERPLBLD CKD-EPI 2021: 51 ML/MIN/1.73M2 (ref 60–?)
EOSINOPHIL # BLD AUTO: 0 X10(3) UL (ref 0–0.7)
EOSINOPHIL NFR BLD AUTO: 0 %
ERYTHROCYTE [DISTWIDTH] IN BLOOD BY AUTOMATED COUNT: 16.5 %
GLOBULIN PLAS-MCNC: 2.2 G/DL (ref 2–3.5)
GLUCOSE BLD-MCNC: 158 MG/DL (ref 70–99)
GLUCOSE UR STRIP.AUTO-MCNC: NORMAL MG/DL
HCT VFR BLD AUTO: 38.5 %
HGB BLD-MCNC: 12.9 G/DL
IMM GRANULOCYTES # BLD AUTO: 0.49 X10(3) UL (ref 0–1)
IMM GRANULOCYTES NFR BLD: 1.9 %
INR BLD: 1.03 (ref 0.8–1.2)
KETONES UR STRIP.AUTO-MCNC: NEGATIVE MG/DL
LACTATE SERPL-SCNC: 1.4 MMOL/L (ref 0.5–2)
LEUKOCYTE ESTERASE UR QL STRIP.AUTO: NEGATIVE
LYMPHOCYTES # BLD AUTO: 0.63 X10(3) UL (ref 1–4)
LYMPHOCYTES NFR BLD AUTO: 2.5 %
MCH RBC QN AUTO: 31.9 PG (ref 26–34)
MCHC RBC AUTO-ENTMCNC: 33.5 G/DL (ref 31–37)
MCV RBC AUTO: 95.1 FL
MONOCYTES # BLD AUTO: 0.52 X10(3) UL (ref 0.1–1)
MONOCYTES NFR BLD AUTO: 2.1 %
MRSA NASAL: NEGATIVE
NEUTROPHILS # BLD AUTO: 23.67 X10 (3) UL (ref 1.5–7.7)
NEUTROPHILS # BLD AUTO: 23.67 X10(3) UL (ref 1.5–7.7)
NEUTROPHILS NFR BLD AUTO: 93.3 %
NITRITE UR QL STRIP.AUTO: NEGATIVE
OSMOLALITY SERPL CALC.SUM OF ELEC: 285 MOSM/KG (ref 275–295)
PH UR STRIP.AUTO: 7 [PH] (ref 5–8)
PLATELET # BLD AUTO: 394 10(3)UL (ref 150–450)
POTASSIUM SERPL-SCNC: 4.8 MMOL/L (ref 3.5–5.1)
PROT SERPL-MCNC: 6.8 G/DL (ref 5.7–8.2)
PROT UR STRIP.AUTO-MCNC: NEGATIVE MG/DL
PROTHROMBIN TIME: 13.6 SECONDS (ref 11.6–14.8)
RBC # BLD AUTO: 4.05 X10(6)UL
RBC UR QL AUTO: NEGATIVE
SODIUM SERPL-SCNC: 135 MMOL/L (ref 136–145)
SP GR UR STRIP.AUTO: 1.01 (ref 1–1.03)
STAPH A BY PCR: NEGATIVE
UROBILINOGEN UR STRIP.AUTO-MCNC: NORMAL MG/DL
WBC # BLD AUTO: 25.4 X10(3) UL (ref 4–11)

## 2024-11-19 PROCEDURE — 71045 X-RAY EXAM CHEST 1 VIEW: CPT | Performed by: EMERGENCY MEDICINE

## 2024-11-19 PROCEDURE — 73502 X-RAY EXAM HIP UNI 2-3 VIEWS: CPT | Performed by: EMERGENCY MEDICINE

## 2024-11-19 PROCEDURE — 87641 MR-STAPH DNA AMP PROBE: CPT | Performed by: EMERGENCY MEDICINE

## 2024-11-19 PROCEDURE — 87640 STAPH A DNA AMP PROBE: CPT | Performed by: EMERGENCY MEDICINE

## 2024-11-19 PROCEDURE — 99285 EMERGENCY DEPT VISIT HI MDM: CPT

## 2024-11-19 PROCEDURE — 87040 BLOOD CULTURE FOR BACTERIA: CPT | Performed by: EMERGENCY MEDICINE

## 2024-11-19 PROCEDURE — 3E0T3BZ INTRODUCTION OF ANESTHETIC AGENT INTO PERIPHERAL NERVES AND PLEXI, PERCUTANEOUS APPROACH: ICD-10-PCS | Performed by: ANESTHESIOLOGY

## 2024-11-19 PROCEDURE — 85730 THROMBOPLASTIN TIME PARTIAL: CPT | Performed by: EMERGENCY MEDICINE

## 2024-11-19 PROCEDURE — 73560 X-RAY EXAM OF KNEE 1 OR 2: CPT | Performed by: EMERGENCY MEDICINE

## 2024-11-19 PROCEDURE — 83605 ASSAY OF LACTIC ACID: CPT | Performed by: EMERGENCY MEDICINE

## 2024-11-19 PROCEDURE — 81003 URINALYSIS AUTO W/O SCOPE: CPT | Performed by: EMERGENCY MEDICINE

## 2024-11-19 PROCEDURE — 36415 COLL VENOUS BLD VENIPUNCTURE: CPT

## 2024-11-19 PROCEDURE — 80053 COMPREHEN METABOLIC PANEL: CPT | Performed by: EMERGENCY MEDICINE

## 2024-11-19 PROCEDURE — 85610 PROTHROMBIN TIME: CPT | Performed by: EMERGENCY MEDICINE

## 2024-11-19 PROCEDURE — 85025 COMPLETE CBC W/AUTO DIFF WBC: CPT | Performed by: EMERGENCY MEDICINE

## 2024-11-19 RX ORDER — PREDNISONE 20 MG/1
40 TABLET ORAL DAILY
COMMUNITY
Start: 2024-11-16 | End: 2024-11-23

## 2024-11-19 RX ORDER — ROSUVASTATIN CALCIUM 20 MG/1
20 TABLET, COATED ORAL NIGHTLY
Status: DISCONTINUED | OUTPATIENT
Start: 2024-11-19 | End: 2024-11-23

## 2024-11-19 RX ORDER — PREDNISONE 5 MG/1
10 TABLET ORAL
Status: DISCONTINUED | OUTPATIENT
Start: 2024-11-30 | End: 2024-11-23

## 2024-11-19 RX ORDER — HYDROCODONE BITARTRATE AND ACETAMINOPHEN 5; 325 MG/1; MG/1
1 TABLET ORAL EVERY 4 HOURS PRN
Status: DISCONTINUED | OUTPATIENT
Start: 2024-11-19 | End: 2024-11-23

## 2024-11-19 RX ORDER — LISINOPRIL 20 MG/1
20 TABLET ORAL DAILY
Status: DISCONTINUED | OUTPATIENT
Start: 2024-11-20 | End: 2024-11-21

## 2024-11-19 RX ORDER — SODIUM CHLORIDE 9 MG/ML
10 INJECTION, SOLUTION INTRAMUSCULAR; INTRAVENOUS; SUBCUTANEOUS AS NEEDED
Status: DISCONTINUED | OUTPATIENT
Start: 2024-11-19 | End: 2024-11-23

## 2024-11-19 RX ORDER — METOCLOPRAMIDE HYDROCHLORIDE 5 MG/ML
10 INJECTION INTRAMUSCULAR; INTRAVENOUS EVERY 8 HOURS PRN
Status: DISCONTINUED | OUTPATIENT
Start: 2024-11-19 | End: 2024-11-19

## 2024-11-19 RX ORDER — MORPHINE SULFATE 4 MG/ML
4 INJECTION, SOLUTION INTRAMUSCULAR; INTRAVENOUS EVERY 2 HOUR PRN
Status: DISCONTINUED | OUTPATIENT
Start: 2024-11-19 | End: 2024-11-23

## 2024-11-19 RX ORDER — LIDOCAINE HYDROCHLORIDE 10 MG/ML
2 INJECTION, SOLUTION EPIDURAL; INFILTRATION; INTRACAUDAL; PERINEURAL AS NEEDED
Status: DISCONTINUED | OUTPATIENT
Start: 2024-11-19 | End: 2024-11-23

## 2024-11-19 RX ORDER — MORPHINE SULFATE 2 MG/ML
1 INJECTION, SOLUTION INTRAMUSCULAR; INTRAVENOUS EVERY 2 HOUR PRN
Status: DISCONTINUED | OUTPATIENT
Start: 2024-11-19 | End: 2024-11-23

## 2024-11-19 RX ORDER — LISINOPRIL 10 MG/1
10 TABLET ORAL DAILY
Status: DISCONTINUED | OUTPATIENT
Start: 2024-11-19 | End: 2024-11-19 | Stop reason: SDUPTHER

## 2024-11-19 RX ORDER — HYDRALAZINE HYDROCHLORIDE 20 MG/ML
10 INJECTION INTRAMUSCULAR; INTRAVENOUS EVERY 4 HOURS PRN
Status: DISCONTINUED | OUTPATIENT
Start: 2024-11-19 | End: 2024-11-23

## 2024-11-19 RX ORDER — PREDNISONE 20 MG/1
20 TABLET ORAL
Status: DISCONTINUED | OUTPATIENT
Start: 2024-11-23 | End: 2024-11-23

## 2024-11-19 RX ORDER — MORPHINE SULFATE 2 MG/ML
2 INJECTION, SOLUTION INTRAMUSCULAR; INTRAVENOUS EVERY 2 HOUR PRN
Status: DISCONTINUED | OUTPATIENT
Start: 2024-11-19 | End: 2024-11-23

## 2024-11-19 RX ORDER — PREDNISONE 20 MG/1
40 TABLET ORAL DAILY
Status: COMPLETED | OUTPATIENT
Start: 2024-11-20 | End: 2024-11-22

## 2024-11-19 RX ORDER — PANTOPRAZOLE SODIUM 20 MG/1
20 TABLET, DELAYED RELEASE ORAL DAILY PRN
Status: DISCONTINUED | OUTPATIENT
Start: 2024-11-19 | End: 2024-11-23

## 2024-11-19 RX ORDER — ONDANSETRON 2 MG/ML
4 INJECTION INTRAMUSCULAR; INTRAVENOUS EVERY 6 HOURS PRN
Status: DISCONTINUED | OUTPATIENT
Start: 2024-11-19 | End: 2024-11-20

## 2024-11-19 RX ORDER — ACETAMINOPHEN 500 MG
500 TABLET ORAL EVERY 4 HOURS PRN
Status: DISCONTINUED | OUTPATIENT
Start: 2024-11-19 | End: 2024-11-23

## 2024-11-19 RX ORDER — HEPARIN SODIUM 5000 [USP'U]/ML
5000 INJECTION, SOLUTION INTRAVENOUS; SUBCUTANEOUS EVERY 8 HOURS SCHEDULED
Status: DISCONTINUED | OUTPATIENT
Start: 2024-11-19 | End: 2024-11-21 | Stop reason: ALTCHOICE

## 2024-11-19 RX ORDER — METOPROLOL SUCCINATE 50 MG/1
50 TABLET, EXTENDED RELEASE ORAL DAILY
Status: DISCONTINUED | OUTPATIENT
Start: 2024-11-20 | End: 2024-11-23

## 2024-11-19 RX ORDER — ACETAMINOPHEN 325 MG/1
650 TABLET ORAL EVERY 4 HOURS PRN
Status: DISCONTINUED | OUTPATIENT
Start: 2024-11-19 | End: 2024-11-23

## 2024-11-19 RX ORDER — PREDNISONE 20 MG/1
20 TABLET ORAL 2 TIMES DAILY
Status: DISCONTINUED | OUTPATIENT
Start: 2024-11-19 | End: 2024-11-19

## 2024-11-19 RX ORDER — METOCLOPRAMIDE HYDROCHLORIDE 5 MG/ML
5 INJECTION INTRAMUSCULAR; INTRAVENOUS EVERY 8 HOURS PRN
Status: DISCONTINUED | OUTPATIENT
Start: 2024-11-19 | End: 2024-11-20

## 2024-11-19 RX ORDER — PANTOPRAZOLE SODIUM 40 MG/1
40 TABLET, DELAYED RELEASE ORAL
Status: DISCONTINUED | OUTPATIENT
Start: 2024-11-20 | End: 2024-11-19

## 2024-11-19 RX ORDER — ROPIVACAINE HYDROCHLORIDE 5 MG/ML
30 INJECTION, SOLUTION EPIDURAL; INFILTRATION; PERINEURAL AS NEEDED
Status: DISCONTINUED | OUTPATIENT
Start: 2024-11-19 | End: 2024-11-23

## 2024-11-19 RX ORDER — HYDROCODONE BITARTRATE AND ACETAMINOPHEN 5; 325 MG/1; MG/1
2 TABLET ORAL EVERY 4 HOURS PRN
Status: DISCONTINUED | OUTPATIENT
Start: 2024-11-19 | End: 2024-11-23

## 2024-11-19 NOTE — ED PROVIDER NOTES
Patient Seen in: [facility] Emergency Department      History     Chief Complaint   Patient presents with    Fall     Stated Complaint: fall, right leg/hip pain, did not hit head, +blood thinners    Subjective:   HPI      78-year-old female with past medical history of peripheral vascular disease on Eliquis presents today for evaluation following a fall.  Patient was taking something out of the trunk of her car and lost her balance.  She fell onto her right side/buttock.  She denied any head trauma or loss of consciousness.  She has not any head or neck pain at this time.  She had some pain to the right hip and knee.  She was able to ambulate for short distance however the pain was too intense so she called her daughter for help.  She denies any other injury or pain.    Objective:     Past Medical History:    AAA (abdominal aortic aneurysm) (HCC)    3x3.4 cm, discussed with pt 9/3/20, recheck yearly, quit tob in 2011, treat risk factors.     Abnormal echocardiogram    possible pulm htn, went to [facility] for eval 12/19/17 and w/u was negative, note received 8/29/18    Abnormal stress echo    milan Bellamy 7/15/21, 12/13/21    Agatston coronary artery calcium score less than 100    74 at [facility], sees Dr. Bellamy, last visit 7/15/21    Atherosclerosis of abdominal aorta (HCC)    at [facility]    Cancer (HCC)    COPD (chronic obstructive pulmonary disease) (HCC)    on cardiopulmonary stress test Dr. Bellamy    Diastolic dysfunction    going to [facility], started on furosemide    Eczema    saw derm 10/13    Elevated liver enzymes    presumed to be due to lipitor    Encounter for eye exam    [facility], overdue 9/20    Esophageal reflux    meds prn    Ex-cigarette smoker    Heart failure with preserved ejection fraction (HCC)    dx at [facility], lasix added    High blood pressure    High cholesterol    Dr. Bellamy managing    History of cardiovascular stress test    obstructive pulmonary component on cardiopulmonary stress test at [facility]  ordered by Dr. Bellamy     History of Doppler echocardiogram    at [facility], Dr. Bellamy    History of normal resting EKG    at [facility]    History of nuclear stress test    neg at [facility], EF 57%    Hypercalcemia    Iliac artery occlusion, right (HCC)    Dr. Cantu. had lithotripsy and stent, last US 21 at [facility]: aortic atherosclerosis, patent R iliac stent    Left atrial enlargement    Dr. Sonja Roe at [facility], EF 77%    Living will in place    Osteopenia    improved , further improvement , d/c med after 5 years, recheck     PFO (patent foramen ovale) ([facility])    dx at [facility], felt not to be clinically signicant. no tx needed    Pulmonary nodule, left    on Ct at [facility], also with mildly enlarged nodes    PVD (peripheral vascular disease) ([facility])    saw Dr. Cantu, stent R iliac on 10/6/20, claudication on R side. , f/u 21    Refused influenza vaccine    she's considering for     Screening for cardiovascular condition    normal CGXT with EF of 70%, see below for abn stress echo    Sinus bradycardia    with LAE              Past Surgical History:   Procedure Laterality Date    Colonoscopy N/A 2015    Procedure: COLONOSCOPY;  Surgeon: Garrett Kim MD;  Location:  ENDOSCOPY          x3 with anesthesia    Stent place iliac art o/p ini  10/06/2020    RAntolin Cantu                Social History     Socioeconomic History    Marital status:     Number of children: 3   Occupational History    Occupation: retired    Tobacco Use    Smoking status: Former     Current packs/day: 0.00     Average packs/day: 0.3 packs/day for 40.0 years (12.0 ttl pk-yrs)     Types: Cigarettes     Start date: 1971     Quit date: 2011     Years since quittin.3    Smokeless tobacco: Never   Vaping Use    Vaping status: Never Used   Substance and Sexual Activity    Alcohol use: Yes     Alcohol/week: 0.0 - 7.0 standard drinks of alcohol     Comment: 1 glass of wine daily    Drug use: No    Sexual activity: Not  Currently     Partners: Male   Other Topics Concern     Service No    Blood Transfusions No    Caffeine Concern No    Occupational Exposure No    Hobby Hazards No    Sleep Concern Yes     Comment: trouble sleeping, poss due to anxiety    Stress Concern Yes     Comment: occassionally    Weight Concern No    Special Diet No    Back Care No    Exercise Yes     Comment: very active at home, goes to fitness center, walking    Seat Belt Yes    Self-Exams Yes     Social Drivers of Health     Financial Resource Strain: Low Risk  (2/13/2024)    Received from [facility]    Overall Financial Resource Strain (CARDIA)     Difficulty of Paying Living Expenses: Not hard at all   Food Insecurity: No Food Insecurity (11/19/2024)    Food Insecurity     Food Insecurity: Never true   Transportation Needs: No Transportation Needs (11/19/2024)    Transportation Needs     Lack of Transportation: No   Physical Activity: High Risk (10/21/2024)    Received from [facility]    Exercise Vital Sign     On average, how many days per week do you engage in moderate to strenuous exercise (like a brisk walk)?: 0 days     On average, how many minutes do you engage in exercise at this level?: 0 min   Stress: No Stress Concern Present (2/13/2024)    Received from [facility]    [employer] - Occupational Stress Questionnaire     Feeling of Stress : Not at all   Housing Stability: Low Risk  (11/19/2024)    Housing Stability     Housing Instability: No                  Physical Exam     ED Triage Vitals [11/19/24 1518]   BP (!) 176/68   Pulse 68   Resp 18   Temp 96.9 °F (36.1 °C)   Temp src Temporal   SpO2 98 %   O2 Device None (Room air)       Current Vitals:   Vital Signs  BP: (!) 200/82  Pulse: 61  Resp: 19  Temp: 98.3 °F (36.8 °C)  Temp src: Oral  MAP (mmHg): (!) 112    Oxygen Therapy  SpO2: 94 %  O2  Device: None (Room air)  Pulse Oximetry Type: Continuous  Oximetry Probe Site Changed: No        Physical Exam  Nursing note reviewed.   Constitutional:       Appearance: Normal appearance.   HENT:      Head: Normocephalic.      Nose: Nose normal.      Mouth/Throat:      Mouth: Mucous membranes are moist.   Eyes:      Extraocular Movements: Extraocular movements intact.   Cardiovascular:      Rate and Rhythm: Normal rate.   Pulmonary:      Effort: Pulmonary effort is normal.   Abdominal:      General: Abdomen is flat.   Musculoskeletal:         General: Normal range of motion.      Right lower leg: No edema.      Left lower leg: No edema.      Comments: Right leg rotated.  Extremities warm and well-perfused.  Tenderness to the right hip with palpation   Skin:     General: Skin is warm.   Neurological:      General: No focal deficit present.      Mental Status: She is alert.   Psychiatric:         Mood and Affect: Mood normal.         ED Course     Labs Reviewed   CBC WITH DIFFERENTIAL WITH PLATELET - Abnormal; Notable for the following components:       Result Value    WBC 25.4 (*)     Neutrophil Absolute Prelim 23.67 (*)     Neutrophil Absolute 23.67 (*)     Lymphocyte Absolute 0.63 (*)     All other components within normal limits   COMP METABOLIC PANEL (14) - Abnormal; Notable for the following components:    Glucose 158 (*)     Sodium 135 (*)     Creatinine 1.10 (*)     eGFR-Cr 51 (*)     A/G Ratio 2.1 (*)     All other components within normal limits   URINALYSIS WITH CULTURE REFLEX - Abnormal; Notable for the following components:    Urine Color Colorless (*)     All other components within normal limits   PTT, ACTIVATED - Normal   PROTHROMBIN TIME (PT) - Normal   LACTIC ACID, PLASMA - Normal   MRSA/SA SCRN BY PCR:EMERG ORTHO SURG ONLY - Normal   BLOOD CULTURE   BLOOD CULTURE            XR CHEST AP PORTABLE  (CPT=71045)    Result Date: 11/19/2024  PROCEDURE:  XR CHEST AP PORTABLE  (CPT=71045)  TECHNIQUE:  AP  chest radiograph was obtained.  COMPARISON:  [facility] , XR, XR CHEST AP PORTABLE  (CPT=71045), 10/05/2024, 9:53 AM.  CT 10/5/2024  INDICATIONS:  fall, right leg/hip pain, did not hit head, +blood thinners  PATIENT STATED HISTORY: (As transcribed by Technologist)  Pt. with right hip fx.    FINDINGS:  The chest appears similar to the prior exam.  There remains a nodular airspace opacity of the right upper lobe.  Please refer to prior CT study for further details.  No pleural effusion is seen.  Mild prominence of the pulmonary vasculature may reflect mild vascular congestion and volume overload.            CONCLUSION:  See above.   LOCATION:  [id number]      Dictated by (CST): Greg Rivera MD on 11/19/2024 at 5:07 PM     Finalized by (CST): Greg Rivera MD on 11/19/2024 at 5:10 PM       XR KNEE (1 OR 2 VIEWS), RIGHT (CPT=73560)    Result Date: 11/19/2024  PROCEDURE:  XR KNEE (1 OR 2 VIEWS), RIGHT (CPT=73560)  COMPARISON:  None.  INDICATIONS:  fall, right leg/hip pain, did not hit head, +blood thinners  PATIENT STATED HISTORY: (As transcribed by Technologist)  Pt. with right knee pain . pt fall today.    FINDINGS:  BONES:  Normal.  No significant arthropathy or acute abnormality.  Mild to moderate diffuse osteopenia is favored. SOFT TISSUES:  Negative.  No visible soft tissue swelling. EFFUSION:  None visible. OTHER:  If clinical symptoms persist then recommend follow-up imaging.            CONCLUSION:  See above.   LOCATION:  [id number]   Dictated by (CST): Greg Rivera MD on 11/19/2024 at 5:06 PM     Finalized by (CST): Greg Rivera MD on 11/19/2024 at 5:07 PM       XR HIP W OR WO PELVIS 2 OR 3 VIEWS, RIGHT (CPT=73502)    Result Date: 11/19/2024  PROCEDURE:  XR HIP W OR WO PELVIS 2 OR 3 VIEWS, RIGHT ( CPT=73502)  TECHNIQUE:  Unilateral 2 to 3 views of the hip and pelvis if performed.  COMPARISON:  None.  INDICATIONS:  fall, right leg/hip pain, did not hit head, +blood thinners  PATIENT STATED HISTORY: (As transcribed by Technologist)   Pt. with right hip pain  she fall today.    FINDINGS:  There is a mildly displaced fracture of the right femoral neck at the transcervical region.  Joint space narrowing at the right hip is present.  Mild to moderate diffuse osteopenia is present.            CONCLUSION:  See above.   LOCATION:  [id number]   Dictated by (CST): Greg Rivera MD on 11/19/2024 at 5:05 PM     Finalized by (CST): Greg Rivera MD on 11/19/2024 at 5:06 PM             [facility]      Differential Diagnosis  78-year-old female presents today for evaluation following mechanical fall.  Patient denies any head trauma loss of consciousness.  She has normal strength and sensation in her bilateral arms and left leg.  Her sensation is normal in the right leg although I deferred from testing it for any strength given the concern of an injury.  She denies any head trauma or loss of consciousness.  Her GCS is 15.  Her only pain currently is localized to the right hip and knee.  Differential would include hip fracture or contusion.  Will obtain x-ray.    6:30 pm  Patient noted to have a right hip fracture.  I notified orthopedics of consultation.  I spoke with the hospitalist in regards to admission.    Patient was noted to have leukocytosis.  She denies any localizing symptoms such as fevers, vomiting, diarrhea, abdominal pain, cough, dysuria.  Patient recently has been placed on prednisone because of acidosis.    Discussions of Management  I spoke with the hospitalist in regards to admission and notified orthopedics of consultation    Independent Interpretation  I independently interpreted the x-ray, patient noted to have a right hip fracture    Admission disposition: 11/19/2024  7:25 PM           Medical Decision Making      Disposition and Plan     Clinical Impression:  1. Closed fracture of right hip, initial encounter ([facility])         Disposition:  Admit  11/19/2024  7:25 pm    Follow-up:  No follow-up provider specified.        Medications Prescribed:  Current  Discharge Medication List              Supplementary Documentation:         Hospital Problems       Present on Admission  Date Reviewed: 9/8/2021            ICD-10-CM Noted POA    * (Principal) Closed fracture of right hip, initial encounter ([facility]) S72.001A 11/19/2024 Unknown

## 2024-11-19 NOTE — ED INITIAL ASSESSMENT (HPI)
Pt to ER with complaints of fall on drive way. Reports r leg/hip pain. + eliquis. Denies hitting head, denies loc. Denies head neck or back pain. In wheelchair on arrival.

## 2024-11-20 ENCOUNTER — APPOINTMENT (OUTPATIENT)
Dept: GENERAL RADIOLOGY | Facility: HOSPITAL | Age: 79
DRG: 522 | End: 2024-11-20
Attending: ORTHOPAEDIC SURGERY
Payer: MEDICARE

## 2024-11-20 ENCOUNTER — APPOINTMENT (OUTPATIENT)
Dept: GENERAL RADIOLOGY | Facility: HOSPITAL | Age: 79
End: 2024-11-20
Attending: ORTHOPAEDIC SURGERY
Payer: MEDICARE

## 2024-11-20 ENCOUNTER — ANESTHESIA (OUTPATIENT)
Dept: SURGERY | Facility: HOSPITAL | Age: 79
End: 2024-11-20
Payer: MEDICARE

## 2024-11-20 LAB
ANION GAP SERPL CALC-SCNC: 4 MMOL/L (ref 0–18)
BASOPHILS # BLD AUTO: 0.02 X10(3) UL (ref 0–0.2)
BASOPHILS NFR BLD AUTO: 0.1 %
BUN BLD-MCNC: 18 MG/DL (ref 9–23)
CALCIUM BLD-MCNC: 8.9 MG/DL (ref 8.7–10.4)
CHLORIDE SERPL-SCNC: 104 MMOL/L (ref 98–112)
CO2 SERPL-SCNC: 25 MMOL/L (ref 21–32)
CREAT BLD-MCNC: 0.94 MG/DL
EGFRCR SERPLBLD CKD-EPI 2021: 61 ML/MIN/1.73M2 (ref 60–?)
EOSINOPHIL # BLD AUTO: 0.04 X10(3) UL (ref 0–0.7)
EOSINOPHIL NFR BLD AUTO: 0.2 %
ERYTHROCYTE [DISTWIDTH] IN BLOOD BY AUTOMATED COUNT: 16.1 %
GLUCOSE BLD-MCNC: 110 MG/DL (ref 70–99)
HCT VFR BLD AUTO: 32.2 %
HGB BLD-MCNC: 11 G/DL
IMM GRANULOCYTES # BLD AUTO: 0.18 X10(3) UL (ref 0–1)
IMM GRANULOCYTES NFR BLD: 0.9 %
LYMPHOCYTES # BLD AUTO: 1.62 X10(3) UL (ref 1–4)
LYMPHOCYTES NFR BLD AUTO: 8.2 %
MAGNESIUM SERPL-MCNC: 1.8 MG/DL (ref 1.6–2.6)
MCH RBC QN AUTO: 32 PG (ref 26–34)
MCHC RBC AUTO-ENTMCNC: 34.2 G/DL (ref 31–37)
MCV RBC AUTO: 93.6 FL
MONOCYTES # BLD AUTO: 1.29 X10(3) UL (ref 0.1–1)
MONOCYTES NFR BLD AUTO: 6.5 %
NEUTROPHILS # BLD AUTO: 16.66 X10 (3) UL (ref 1.5–7.7)
NEUTROPHILS # BLD AUTO: 16.66 X10(3) UL (ref 1.5–7.7)
NEUTROPHILS NFR BLD AUTO: 84.1 %
OSMOLALITY SERPL CALC.SUM OF ELEC: 279 MOSM/KG (ref 275–295)
PLATELET # BLD AUTO: 293 10(3)UL (ref 150–450)
POTASSIUM SERPL-SCNC: 4.4 MMOL/L (ref 3.5–5.1)
RBC # BLD AUTO: 3.44 X10(6)UL
SODIUM SERPL-SCNC: 133 MMOL/L (ref 136–145)
WBC # BLD AUTO: 19.8 X10(3) UL (ref 4–11)

## 2024-11-20 PROCEDURE — 73501 X-RAY EXAM HIP UNI 1 VIEW: CPT | Performed by: ORTHOPAEDIC SURGERY

## 2024-11-20 PROCEDURE — 88305 TISSUE EXAM BY PATHOLOGIST: CPT | Performed by: ORTHOPAEDIC SURGERY

## 2024-11-20 PROCEDURE — 88311 DECALCIFY TISSUE: CPT | Performed by: ORTHOPAEDIC SURGERY

## 2024-11-20 PROCEDURE — 0SRR0J9 REPLACEMENT OF RIGHT HIP JOINT, FEMORAL SURFACE WITH SYNTHETIC SUBSTITUTE, CEMENTED, OPEN APPROACH: ICD-10-PCS | Performed by: ORTHOPAEDIC SURGERY

## 2024-11-20 PROCEDURE — 83735 ASSAY OF MAGNESIUM: CPT | Performed by: HOSPITALIST

## 2024-11-20 PROCEDURE — 80048 BASIC METABOLIC PNL TOTAL CA: CPT | Performed by: HOSPITALIST

## 2024-11-20 PROCEDURE — 85025 COMPLETE CBC W/AUTO DIFF WBC: CPT | Performed by: HOSPITALIST

## 2024-11-20 DEVICE — IMPLANTABLE DEVICE: Type: IMPLANTABLE DEVICE | Site: HIP | Status: FUNCTIONAL

## 2024-11-20 DEVICE — IMPLANTABLE DEVICE
Type: IMPLANTABLE DEVICE | Site: HIP | Status: FUNCTIONAL
Brand: VERSYS®

## 2024-11-20 DEVICE — BONE PREPARATION KIT
Type: IMPLANTABLE DEVICE | Site: HIP | Status: FUNCTIONAL
Brand: BIOPREP

## 2024-11-20 DEVICE — IMPLANTABLE DEVICE
Type: IMPLANTABLE DEVICE | Site: HIP | Status: FUNCTIONAL
Brand: RINGLOC BI-POLAR HIP SYSTEM

## 2024-11-20 DEVICE — IMPLANTABLE DEVICE
Type: IMPLANTABLE DEVICE | Site: HIP | Status: FUNCTIONAL
Brand: VERSYS HERITAGE®

## 2024-11-20 RX ORDER — LIDOCAINE HYDROCHLORIDE 10 MG/ML
INJECTION, SOLUTION EPIDURAL; INFILTRATION; INTRACAUDAL; PERINEURAL AS NEEDED
Status: DISCONTINUED | OUTPATIENT
Start: 2024-11-20 | End: 2024-11-20 | Stop reason: SURG

## 2024-11-20 RX ORDER — GLYCOPYRROLATE 0.2 MG/ML
INJECTION, SOLUTION INTRAMUSCULAR; INTRAVENOUS AS NEEDED
Status: DISCONTINUED | OUTPATIENT
Start: 2024-11-20 | End: 2024-11-20 | Stop reason: SURG

## 2024-11-20 RX ORDER — ACETAMINOPHEN 500 MG
1000 TABLET ORAL ONCE AS NEEDED
Status: DISCONTINUED | OUTPATIENT
Start: 2024-11-20 | End: 2024-11-20 | Stop reason: HOSPADM

## 2024-11-20 RX ORDER — DEXAMETHASONE SODIUM PHOSPHATE 4 MG/ML
VIAL (ML) INJECTION AS NEEDED
Status: DISCONTINUED | OUTPATIENT
Start: 2024-11-20 | End: 2024-11-20 | Stop reason: SURG

## 2024-11-20 RX ORDER — METOCLOPRAMIDE HYDROCHLORIDE 5 MG/ML
5 INJECTION INTRAMUSCULAR; INTRAVENOUS EVERY 8 HOURS PRN
Status: DISCONTINUED | OUTPATIENT
Start: 2024-11-20 | End: 2024-11-20 | Stop reason: HOSPADM

## 2024-11-20 RX ORDER — SODIUM CHLORIDE, SODIUM LACTATE, POTASSIUM CHLORIDE, CALCIUM CHLORIDE 600; 310; 30; 20 MG/100ML; MG/100ML; MG/100ML; MG/100ML
INJECTION, SOLUTION INTRAVENOUS CONTINUOUS
Status: DISCONTINUED | OUTPATIENT
Start: 2024-11-20 | End: 2024-11-20 | Stop reason: HOSPADM

## 2024-11-20 RX ORDER — HYDROCODONE BITARTRATE AND ACETAMINOPHEN 5; 325 MG/1; MG/1
2 TABLET ORAL ONCE AS NEEDED
Status: DISCONTINUED | OUTPATIENT
Start: 2024-11-20 | End: 2024-11-20 | Stop reason: HOSPADM

## 2024-11-20 RX ORDER — POLYETHYLENE GLYCOL 3350 17 G/17G
17 POWDER, FOR SOLUTION ORAL DAILY PRN
Status: DISCONTINUED | OUTPATIENT
Start: 2024-11-20 | End: 2024-11-23

## 2024-11-20 RX ORDER — MAGNESIUM OXIDE 400 MG/1
400 TABLET ORAL ONCE
Status: COMPLETED | OUTPATIENT
Start: 2024-11-20 | End: 2024-11-20

## 2024-11-20 RX ORDER — ACETAMINOPHEN 325 MG/1
650 TABLET ORAL ONCE
Status: DISCONTINUED | OUTPATIENT
Start: 2024-11-20 | End: 2024-11-20 | Stop reason: HOSPADM

## 2024-11-20 RX ORDER — ONDANSETRON 2 MG/ML
4 INJECTION INTRAMUSCULAR; INTRAVENOUS EVERY 6 HOURS PRN
Status: DISCONTINUED | OUTPATIENT
Start: 2024-11-20 | End: 2024-11-23

## 2024-11-20 RX ORDER — NALOXONE HYDROCHLORIDE 0.4 MG/ML
0.08 INJECTION, SOLUTION INTRAMUSCULAR; INTRAVENOUS; SUBCUTANEOUS AS NEEDED
Status: DISCONTINUED | OUTPATIENT
Start: 2024-11-20 | End: 2024-11-20 | Stop reason: HOSPADM

## 2024-11-20 RX ORDER — DOCUSATE SODIUM 100 MG/1
100 CAPSULE, LIQUID FILLED ORAL 2 TIMES DAILY
Status: DISCONTINUED | OUTPATIENT
Start: 2024-11-20 | End: 2024-11-23

## 2024-11-20 RX ORDER — HYDROCODONE BITARTRATE AND ACETAMINOPHEN 5; 325 MG/1; MG/1
1 TABLET ORAL ONCE AS NEEDED
Status: DISCONTINUED | OUTPATIENT
Start: 2024-11-20 | End: 2024-11-20 | Stop reason: HOSPADM

## 2024-11-20 RX ORDER — EPHEDRINE SULFATE 50 MG/ML
INJECTION INTRAVENOUS AS NEEDED
Status: DISCONTINUED | OUTPATIENT
Start: 2024-11-20 | End: 2024-11-20 | Stop reason: SURG

## 2024-11-20 RX ORDER — BISACODYL 10 MG
10 SUPPOSITORY, RECTAL RECTAL
Status: DISCONTINUED | OUTPATIENT
Start: 2024-11-20 | End: 2024-11-23

## 2024-11-20 RX ORDER — SENNOSIDES 8.6 MG
17.2 TABLET ORAL NIGHTLY
Status: DISCONTINUED | OUTPATIENT
Start: 2024-11-20 | End: 2024-11-23

## 2024-11-20 RX ORDER — ROCURONIUM BROMIDE 10 MG/ML
INJECTION, SOLUTION INTRAVENOUS AS NEEDED
Status: DISCONTINUED | OUTPATIENT
Start: 2024-11-20 | End: 2024-11-20 | Stop reason: SURG

## 2024-11-20 RX ORDER — HYDROMORPHONE HYDROCHLORIDE 1 MG/ML
0.6 INJECTION, SOLUTION INTRAMUSCULAR; INTRAVENOUS; SUBCUTANEOUS EVERY 5 MIN PRN
Status: DISCONTINUED | OUTPATIENT
Start: 2024-11-20 | End: 2024-11-20 | Stop reason: HOSPADM

## 2024-11-20 RX ORDER — SODIUM CHLORIDE 9 MG/ML
INJECTION, SOLUTION INTRAVENOUS CONTINUOUS
Status: DISCONTINUED | OUTPATIENT
Start: 2024-11-20 | End: 2024-11-20 | Stop reason: HOSPADM

## 2024-11-20 RX ORDER — METOCLOPRAMIDE HYDROCHLORIDE 5 MG/ML
5 INJECTION INTRAMUSCULAR; INTRAVENOUS EVERY 8 HOURS PRN
Status: DISCONTINUED | OUTPATIENT
Start: 2024-11-20 | End: 2024-11-23

## 2024-11-20 RX ORDER — ONDANSETRON 2 MG/ML
INJECTION INTRAMUSCULAR; INTRAVENOUS AS NEEDED
Status: DISCONTINUED | OUTPATIENT
Start: 2024-11-20 | End: 2024-11-20 | Stop reason: SURG

## 2024-11-20 RX ORDER — HYDROMORPHONE HYDROCHLORIDE 1 MG/ML
0.4 INJECTION, SOLUTION INTRAMUSCULAR; INTRAVENOUS; SUBCUTANEOUS EVERY 5 MIN PRN
Status: DISCONTINUED | OUTPATIENT
Start: 2024-11-20 | End: 2024-11-20 | Stop reason: HOSPADM

## 2024-11-20 RX ORDER — PHENYLEPHRINE HCL 10 MG/ML
VIAL (ML) INJECTION AS NEEDED
Status: DISCONTINUED | OUTPATIENT
Start: 2024-11-20 | End: 2024-11-20 | Stop reason: SURG

## 2024-11-20 RX ORDER — ONDANSETRON 2 MG/ML
4 INJECTION INTRAMUSCULAR; INTRAVENOUS EVERY 6 HOURS PRN
Status: DISCONTINUED | OUTPATIENT
Start: 2024-11-20 | End: 2024-11-20 | Stop reason: HOSPADM

## 2024-11-20 RX ORDER — SODIUM CHLORIDE 9 MG/ML
INJECTION, SOLUTION INTRAVENOUS CONTINUOUS PRN
Status: DISCONTINUED | OUTPATIENT
Start: 2024-11-20 | End: 2024-11-20 | Stop reason: SURG

## 2024-11-20 RX ORDER — SODIUM PHOSPHATE, DIBASIC AND SODIUM PHOSPHATE, MONOBASIC 7; 19 G/230ML; G/230ML
1 ENEMA RECTAL ONCE AS NEEDED
Status: DISCONTINUED | OUTPATIENT
Start: 2024-11-20 | End: 2024-11-23

## 2024-11-20 RX ORDER — DOXEPIN HYDROCHLORIDE 50 MG/1
1 CAPSULE ORAL DAILY
Status: DISCONTINUED | OUTPATIENT
Start: 2024-11-21 | End: 2024-11-23

## 2024-11-20 RX ORDER — LABETALOL HYDROCHLORIDE 5 MG/ML
5 INJECTION, SOLUTION INTRAVENOUS EVERY 5 MIN PRN
Status: DISCONTINUED | OUTPATIENT
Start: 2024-11-20 | End: 2024-11-20 | Stop reason: HOSPADM

## 2024-11-20 RX ORDER — NEOSTIGMINE METHYLSULFATE 1 MG/ML
INJECTION INTRAVENOUS AS NEEDED
Status: DISCONTINUED | OUTPATIENT
Start: 2024-11-20 | End: 2024-11-20 | Stop reason: SURG

## 2024-11-20 RX ORDER — HYDROMORPHONE HYDROCHLORIDE 1 MG/ML
0.2 INJECTION, SOLUTION INTRAMUSCULAR; INTRAVENOUS; SUBCUTANEOUS EVERY 5 MIN PRN
Status: DISCONTINUED | OUTPATIENT
Start: 2024-11-20 | End: 2024-11-20 | Stop reason: HOSPADM

## 2024-11-20 RX ADMIN — PHENYLEPHRINE HCL 100 MCG: 10 MG/ML VIAL (ML) INJECTION at 17:30:00

## 2024-11-20 RX ADMIN — PHENYLEPHRINE HCL 100 MCG: 10 MG/ML VIAL (ML) INJECTION at 18:26:00

## 2024-11-20 RX ADMIN — EPHEDRINE SULFATE 10 MG: 50 INJECTION INTRAVENOUS at 18:23:00

## 2024-11-20 RX ADMIN — LIDOCAINE HYDROCHLORIDE 5 ML: 10 INJECTION, SOLUTION EPIDURAL; INFILTRATION; INTRACAUDAL; PERINEURAL at 17:22:00

## 2024-11-20 RX ADMIN — SODIUM CHLORIDE: 9 INJECTION, SOLUTION INTRAVENOUS at 17:16:00

## 2024-11-20 RX ADMIN — DEXAMETHASONE SODIUM PHOSPHATE 4 MG: 4 MG/ML VIAL (ML) INJECTION at 17:24:00

## 2024-11-20 RX ADMIN — NEOSTIGMINE METHYLSULFATE 3 MG: 1 INJECTION INTRAVENOUS at 19:21:00

## 2024-11-20 RX ADMIN — EPHEDRINE SULFATE 10 MG: 50 INJECTION INTRAVENOUS at 18:48:00

## 2024-11-20 RX ADMIN — ONDANSETRON 4 MG: 2 INJECTION INTRAMUSCULAR; INTRAVENOUS at 18:52:00

## 2024-11-20 RX ADMIN — SODIUM CHLORIDE: 9 INJECTION, SOLUTION INTRAVENOUS at 18:14:00

## 2024-11-20 RX ADMIN — EPHEDRINE SULFATE 5 MG: 50 INJECTION INTRAVENOUS at 18:18:00

## 2024-11-20 RX ADMIN — GLYCOPYRROLATE 0.6 MG: 0.2 INJECTION, SOLUTION INTRAMUSCULAR; INTRAVENOUS at 19:21:00

## 2024-11-20 RX ADMIN — EPHEDRINE SULFATE 5 MG: 50 INJECTION INTRAVENOUS at 17:39:00

## 2024-11-20 RX ADMIN — SODIUM CHLORIDE: 9 INJECTION, SOLUTION INTRAVENOUS at 17:32:00

## 2024-11-20 RX ADMIN — PHENYLEPHRINE HCL 50 MCG: 10 MG/ML VIAL (ML) INJECTION at 18:55:00

## 2024-11-20 RX ADMIN — PHENYLEPHRINE HCL 100 MCG: 10 MG/ML VIAL (ML) INJECTION at 17:26:00

## 2024-11-20 RX ADMIN — ROCURONIUM BROMIDE 50 MG: 10 INJECTION, SOLUTION INTRAVENOUS at 17:24:00

## 2024-11-20 RX ADMIN — GLYCOPYRROLATE 0.2 MG: 0.2 INJECTION, SOLUTION INTRAMUSCULAR; INTRAVENOUS at 17:54:00

## 2024-11-20 RX ADMIN — EPHEDRINE SULFATE 5 MG: 50 INJECTION INTRAVENOUS at 18:01:00

## 2024-11-20 RX ADMIN — SODIUM CHLORIDE: 9 INJECTION, SOLUTION INTRAVENOUS at 19:13:00

## 2024-11-20 NOTE — CONSULTS
[facility]   part of [facility]    Report of Consultation    Mojgan Jefferson Patient Status:  Inpatient    1945 MRN [id number]   Location [facility] 3SW-A Attending John Womack MD   Hosp Day # 1 PCP Guerrero Naik MD     Date of Admission:  2024  Date of Consult:  24    SUBJECTIVE:    Reason for Consultation: Fall with right hip pain  Consult Requested by: ED    History of Present Illness:  Patient is a(n) 78 year old female who presents with right hip pain. Patient brought in to the ER after a fall. Patient was unable to ambulate after the fall and had pain in the right hip. Patient denies hitting head, LOC, nausea or vommitting. Patient denies any back/neck or opposite leg pain.      Past Medical History:    AAA (abdominal aortic aneurysm) (HCC)    3x3.4 cm, discussed with pt 9/3/20, recheck yearly, quit tob in , treat risk factors.     Abnormal echocardiogram    possible pulm htn, went to [place] for eval 17 and w/u was negative, note received 18    Abnormal stress echo    milan Bellamy 7/15/21, 21    Agatston coronary artery calcium score less than 100    72 at [place], sees Dr. Bellamy, last visit 7/15/21    Atherosclerosis of abdominal aorta (HCC)    at [facility]    Cancer (HCC)    COPD (chronic obstructive pulmonary disease) (HCC)    on cardiopulmonary stress test Dr. Bellamy    Diastolic dysfunction    going to [place], started on furosemide    Eczema    saw derm 10/13    Elevated liver enzymes    presumed to be due to lipitor    Encounter for eye exam    [facility], overdue     Esophageal reflux    meds prn    Ex-cigarette smoker    Heart failure with preserved ejection fraction (HCC)    dx at [place], lasix added    High blood pressure    High cholesterol    Dr. Bellamy managing    History of cardiovascular stress test    obstructive pulmonary component on cardiopulmonary stress test at [facility] ordered by Dr. Bellamy     History of Doppler echocardiogram    at  [facility], Dr. Bellamy    History of normal resting EKG    at [facility]    History of nuclear stress test    neg at [place], EF 57%    Hypercalcemia    Iliac artery occlusion, right (HCC)    Dr. Cantu. had lithotripsy and stent, last US 21 at [facility]: aortic atherosclerosis, patent R iliac stent    Left atrial enlargement    Dr. Sonja Roe at [facility], EF 77%    Living will in place    Osteopenia    improved , further improvement , d/c med after 5 years, recheck     PFO (patent foramen ovale) ([facility])    dx at [place], felt not to be clinically signicant. no tx needed    Pulmonary nodule, left    on Ct at [place], also with mildly enlarged nodes    PVD (peripheral vascular disease) ([facility])    saw Dr. Cantu, stent R iliac on 10/6/20, claudication on R side. , f/u 21    Refused influenza vaccine    she's considering for     Screening for cardiovascular condition    normal CGXT with EF of 70%, see below for abn stress echo    Sinus bradycardia    with LAE     Past Surgical History:   Procedure Laterality Date    Colonoscopy N/A 2015    Procedure: COLONOSCOPY;  Surgeon: Garrett Kim MD;  Location:  ENDOSCOPY          x3 with anesthesia    Stent place iliac art o/p ini  10/06/2020    RAntolin Cantu     Social History     Tobacco Use    Smoking status: Former     Current packs/day: 0.00     Average packs/day: 0.3 packs/day for 40.0 years (12.0 ttl pk-yrs)     Types: Cigarettes     Start date: 1971     Quit date: 2011     Years since quittin.4    Smokeless tobacco: Never   Substance Use Topics    Alcohol use: Yes     Alcohol/week: 0.0 - 7.0 standard drinks of alcohol     Comment: 1 glass of wine daily     Family History   Problem Relation Age of Onset    Heart Disorder Father     Heart Disorder Son         possible heart prob    Other (Other) Son         transverse myelitis, psoriasis    Hypertension Sister     Hypertension Brother     Heart Disorder Brother 76        MI    Hypertension Brother     Hypertension  Brother     Hypertension Sister     No Known Problems Daughter     No Known Problems Daughter     No Known Problems Brother     No Known Problems Brother     Breast Cancer Cousin 58        M-cousin ago of dx 58       Allergies:  Allergies[1]    Review of Systems:  See H&P    OBJECTIVE:    Blood pressure 148/64, pulse 55, temperature 98.4 °F (36.9 °C), temperature source Oral, resp. rate 17, height 5' 1\" (1.549 m), weight 122 lb (55.3 kg), SpO2 94%, not currently breastfeeding.    Intake/Output Summary (Last 24 hours) at 11/20/2024 0652  Last data filed at 11/20/2024 0435  Gross per 24 hour   Intake --   Output 1300 ml   Net -1300 ml       Physical Exam:  General: Alert, cooperative, no distress, appears stated age  Extremities: Bilateral lower extremities neurovascularly intact. Bilateral calf soft, non-tender. right lower extremity externally rotated and shortened. Pain any movement and to palpation of right hip. No pain to knee or ankle of right leg. left lower extremity with full ROM and no pain.  Pulses: 2+ and symmetric all extremities.  Skin: Intact over right hip with no ecchymosis or abrasions noted    Diagnostics: Xray of right hip/pelvis demonstrate a displaced femoral neck fracture.    Data Review: Hgb 11, WBC 19.8, Cr 0.94, INR 1.03, PTT 25    ASSESSMENT:    right Femoral neck fracture, displaced    PLAN:    Patients diagnosis and treatment options are discussed today. Patient will need a right hip Hemiarthroplasty. Risks and benefits of the surgery were discussed today and the patient agreed with the plan. Please consent the patient for right hip Hemiarthroplasty later today. Antibiotics have been ordered and are on call to OR. DVT prophylaxis with SCD are in place. Patient should remain NPO. Patient will need medical clearance for surgery later today. Continue to hold home Eliquis to provide adequate anticoagulation washout.     Andrew Anand MD  11/20/2024  6:52 AM       [1] No Known Allergies

## 2024-11-20 NOTE — H&P
[facility]   part of [facility]    History and Physical     Mojgan Jefferson Patient Status:  Emergency    1945 MRN [id number]   Location [facility] EMERGENCY DEPARTMENT Attending Westley Arriaga*   Hosp Day # 0 PCP Guerrero Naik MD     Chief Complaint: Fall with right leg pain     History of Present Illness: Mojgan Jefferson is a 78 year old female with history of AAA, ??COPD, Diastolic dysfunction, gerd, PAD, HTN, HLD, hx PFO, lung cancer on immunotherapy presenting with a mechanical fall and right leg pain. Patient says that she was trying to get something out of her trunk and lost balance and fell on her right hip. She was able to get up and wobble to her house. When her daughter came over to take her to a doctors appointment she told her she was unable to walk so she brought her mom to the ER for evaluation. Patient denies any other positive review of systems.     Past Medical History:  Past Medical History:    AAA (abdominal aortic aneurysm) (HCC)    3x3.4 cm, discussed with pt 9/3/20, recheck yearly, quit tob in , treat risk factors.     Abnormal echocardiogram    possible pulm htn, went to [place] for eval 17 and w/u was negative, note received 18    Abnormal stress echo    milan Bellamy 7/15/21, 21    Agatston coronary artery calcium score less than 100    72 at [place], sees Dr. Bellamy, last visit 7/15/21    Atherosclerosis of abdominal aorta (HCC)    at [facility]    Cancer (HCC)    COPD (chronic obstructive pulmonary disease) (HCC)    on cardiopulmonary stress test Dr. Bellamy    Diastolic dysfunction    going to [place], started on furosemide    Eczema    saw derm 10/13    Elevated liver enzymes    presumed to be due to lipitor    Encounter for eye exam    [employer], overdue     Esophageal reflux    meds prn    Ex-cigarette smoker    Heart failure with preserved ejection fraction (HCC)    dx at [place], lasix added    High blood pressure    High cholesterol      Josesito managing    History of cardiovascular stress test    obstructive pulmonary component on cardiopulmonary stress test at [facility] ordered by Dr. Bellamy     History of Doppler echocardiogram    at [facility], Dr. Bellamy    History of normal resting EKG    at [facility]    History of nuclear stress test    neg at [place], EF 57%    Hypercalcemia    Iliac artery occlusion, right (HCC)    Dr. Cantu. had lithotripsy and stent, last US 21 at [facility]: aortic atherosclerosis, patent R iliac stent    Left atrial enlargement    Dr. Sonja Roe at [facility], EF 77%    Living will in place    Osteopenia    improved , further improvement , d/c med after 5 years, recheck     PFO (patent foramen ovale) ([facility])    dx at [place], felt not to be clinically signicant. no tx needed    Pulmonary nodule, left    on Ct at [place], also with mildly enlarged nodes    PVD (peripheral vascular disease) ([facility])    saw Dr. Cantu, stent R iliac on 10/6/20, claudication on R side. , f/u 21    Refused influenza vaccine    she's considering for     Screening for cardiovascular condition    normal CGXT with EF of 70%, see below for abn stress echo    Sinus bradycardia    with LAE        Past Surgical History:   Past Surgical History:   Procedure Laterality Date    Colonoscopy N/A 2015    Procedure: COLONOSCOPY;  Surgeon: Garrett Kim MD;  Location:  ENDOSCOPY          x3 with anesthesia    Stent place iliac art o/p ini  10/06/2020    RAntolin Cantu       Social History:  reports that she quit smoking about 13 years ago. Her smoking use included cigarettes. She started smoking about 53 years ago. She has a 12 pack-year smoking history. She has never used smokeless tobacco. She reports current alcohol use. She reports that she does not use drugs.no illegal drugs, , 3 children, no cane or walker, live alone    Family History:   Family History   Problem Relation Age of Onset    Heart Disorder Father     Heart Disorder Son         possible heart  prob    Other (Other) Son         transverse myelitis, psoriasis    Hypertension Sister     Hypertension Brother     Heart Disorder Brother 76        MI    Hypertension Brother     Hypertension Brother     Hypertension Sister     No Known Problems Daughter     No Known Problems Daughter     No Known Problems Brother     No Known Problems Brother     Breast Cancer Cousin 58        M-cousin ago of dx 58   Mother passed  Father passed     Allergies: Allergies[1]    Medications:  Medications Ordered Prior to Encounter[2]    Review of Systems:   A comprehensive 14 point review of systems was completed.    Pertinent positives and negatives noted in the HPI.    Physical Exam:    BP (!) 176/68   Pulse 68   Temp 96.9 °F (36.1 °C) (Temporal)   Resp 18   Ht 5' 1\" (1.549 m)   Wt 122 lb (55.3 kg)   SpO2 98%   BMI 23.05 kg/m²   General: No acute distress. Alert and oriented   HEENT: Normocephalic atraumatic. Moist mucous membranes. EOM-I  Neck: No JVD. No carotid bruits.  Respiratory: Clear to auscultation bilaterally. No wheezes. No crackles  Cardiovascular: S1, S2. Regular rate and rhythm. No murmurs  Chest and Back: No tenderness or deformity.  Abdomen: Soft, nontender, nondistended.  Positive bowel sounds. No rebound, guarding  Neurologic: No focal neurological deficits. CNII-XII grossly intact., sensation and strength intact  Musculoskeletal: Moves all extremities.  Extremities: No significant pitting edema or tenderness of the LE  Integument: No new rashes or lesions.   Psychiatric: Appropriate mood and affect.      Diagnostic Data:      Labs:  Recent Labs   Lab 11/19/24  1748   WBC 25.4*   HGB 12.9   MCV 95.1   .0       No results for input(s): \"GLU\", \"BUN\", \"CREATSERUM\", \"GFRAA\", \"GFRNAA\", \"CA\", \"ALB\", \"NA\", \"K\", \"CL\", \"CO2\", \"ALKPHO\", \"AST\", \"ALT\", \"BILT\", \"TP\" in the last 168 hours.    CrCl cannot be calculated (Patient's most recent lab result is older than the maximum 7 days allowed.).    No results for  input(s): \"PTP\", \"INR\" in the last 168 hours.    No results for input(s): \"TROP\", \"CK\" in the last 168 hours.    Imaging: Imaging data reviewed in Epic.      ASSESSMENT / PLAN:   78 year old female with history of AAA, ??COPD, Diastolic dysfunction, gerd, PAD, HTN, HLD, hx PFO, lung cancer on immunotherapy presenting with a mechanical fall and right leg pain.     Right femoral neck fracture  -ortho consulted  -prn iv morphine   -prn po norco  -NPO at midnight for possible surgery     HTN  -sbp elevated  -continue metoprolol  -continue lisinopril   -iv hydralazine prn for sbp< 160  -consult cardiology for management    HLD  -rosuvastatin    PAD  -asa on hold  -statin    Hx PE  -eliquis on hold due to fx and surgery in near future    GERD  -pantoprazole    Recent immunotherapy induced kidney injury  -continue prednisone if ok with ortho    Leukocytosis  -no obvious infection  -likely leukemoid reaction to fracture and prednisone use.     Preoperative Eval  -pt denies any active cardiac or pulmonary pathology  -pt with no significant cardiac hx and was told she actually does not have copd  -pt lung and cardiac evaluation within acceptable limits  -consult cardiology for preop eval given elevated BP and hx diastolic HF    Quality:  DVT Prophylaxis: scd, heparin sq  CODE status: Full  Matos: puriwick    Plan of care discussed with patient, daughter and staff    Dispo: no discharge.     John Womack MD  [facility]  [phone]                           [1] No Known Allergies  [2]   Current Facility-Administered Medications on File Prior to Encounter   Medication Dose Route Frequency Provider Last Rate Last Admin    [COMPLETED] potassium chloride (Klor-Con M20) tab 40 mEq  40 mEq Oral Once Latoya Mcdoanld MD   40 mEq at 10/08/24 0834    [COMPLETED] magnesium oxide (Mag-Ox) tab 400 mg  400 mg Oral Once Jyothi Castro DO   400 mg at 10/08/24 1046    [COMPLETED] potassium chloride (Klor-Con M20) tab 40 mEq   40 mEq Oral Once Jyothi Castro DO   40 mEq at 10/08/24 1046    [COMPLETED] potassium phosphate dibasic 15 mmol in sodium chloride 0.9% 250 mL IVPB  15 mmol Intravenous Once Ophelia Miranda MD   Stopped at 10/07/24 1219    Followed by    [COMPLETED] potassium chloride 20 mEq/100mL IVPB premix 20 mEq  20 mEq Intravenous Once Ophelia Miranda MD 50 mL/hr at 10/07/24 1219 20 mEq at 10/07/24 1219    [COMPLETED] potassium phosphate dibasic 15 mmol in sodium chloride 0.9% 250 mL IVPB  15 mmol Intravenous Once Ophelia Miranda MD   Stopped at 10/06/24 1200    Followed by    [COMPLETED] potassium chloride 40 mEq in 250mL sodium chloride 0.9% IVPB premix  40 mEq Intravenous Once Ophelia Miranda MD 62.5 mL/hr at 10/06/24 1202 40 mEq at 10/06/24 1202    [COMPLETED] magnesium sulfate in sterile water for injection 2 g/50mL IVPB premix 2 g  2 g Intravenous Once Latoya Mcdonald MD   Stopped at 10/06/24 1311    [COMPLETED] potassium chloride 40 mEq in 250mL sodium chloride 0.9% IVPB premix  40 mEq Intravenous Once Ophelia Miranda MD 62.5 mL/hr at 10/06/24 2211 40 mEq at 10/06/24 2211    [COMPLETED] sodium chloride 0.9 % IV bolus 1,000 mL  1,000 mL Intravenous Once Asiya Jacinto DO   Stopped at 10/05/24 1019    [COMPLETED] potassium chloride (Klor-Con M20) tab 40 mEq  40 mEq Oral Q4H Ophelia Miranda MD   40 mEq at 10/06/24 0223    [COMPLETED] magnesium oxide (Mag-Ox) tab 800 mg  800 mg Oral Once Ophelia Miranda MD   800 mg at 10/05/24 2204     Current Outpatient Medications on File Prior to Encounter   Medication Sig Dispense Refill    predniSONE 20 MG Oral Tab Take 1 tablet (20 mg total) by mouth 2 (two) times daily.      lisinopril 20 MG Oral Tab Take 0.5 tablets (10 mg total) by mouth daily.      apixaban 5 MG Oral Tab Take 2 tabs (10mg) by mouth twice daily for 7 days, then take 1 tab (5mg) by mouth twice daily thereafter. 74 tablet 0    metoprolol succinate 50 MG Oral Tablet 24 Hr Take 1 tablet (50 mg  total) by mouth daily. 90 tablet 3    Rosuvastatin Calcium 20 MG Oral Tab       ASPIRIN 81 MG OR TABS 1 TABLET DAILY      FISH OIL 1000 MG OR CAPS 1 daily      hydrOXYzine 25 MG Oral Tab Take 1 tablet (25 mg total) by mouth 3 (three) times daily as needed for Itching. (Patient not taking: Reported on 11/19/2024) 20 tablet 0    Albuterol Sulfate  (90 Base) MCG/ACT Inhalation Aero Soln 2 puffs 15 minutes before exercise. (Patient not taking: Reported on 11/19/2024) 1 Inhaler 12    OMEPRAZOLE 20 MG Oral Capsule Delayed Release TAKE 1 CAPSULE(20 MG) BY MOUTH DAILY (Patient taking differently: Take 1 capsule (20 mg total) by mouth as needed.) 90 capsule 3    Calcium Carbonate-Vitamin D (CALTRATE 600+D OR)  (Patient not taking: Reported on 11/19/2024)      VITAMIN B COMPLEX IJ 1 Q DAY (Patient not taking: Reported on 11/19/2024)

## 2024-11-20 NOTE — CONSULTS
[facility]  Report of Consultation    Mojgan Jefferson Patient Status:  Inpatient    1945 MRN [id number]   Location [facility] 3SW-A Attending John Womack MD   Hosp Day # 1 PCP Guerrero Naik MD     Reason for Consultation:   HTN    History of Present Illness:   Mojgan Jefferson is a(n) 78 year old female with a past history of HTN, HL, PFO, past PE, AAA, PVD s/p R iliac stenting, COPD, and metastatic lung CA.  Follows CA at [facility].  Echo  with NL EF and only mild TR.  Pt has been on antihypertensive Rx and typically demonstrating controlled BP.  Pt suffered a fall at home - simple fall.  No LOC.  Had persisting pain and proceeded to ER.  R femoral neck Fx has been identified.  On presentation, BP has been elevated.  BP has typically been controlled on her routine medications.  No chest pain, pressure, heaviness, or tightness. No SOB.  O LE edema, orthopnea,or PND.  No palpitations.  No presyncope or syncope.  Prior to her fall reports reasonable activity.  Walks on treadmill for 30 minutes, albeit with slow pace.  Uses O2 when on treadmill, but not other times.  .          Past Medical History:   Past Medical History:    AAA (abdominal aortic aneurysm) (HCC)    3x3.4 cm, discussed with pt 9/3/20, recheck yearly, quit tob in , treat risk factors.     Abnormal echocardiogram    possible pulm htn, went to [place] for eval 17 and w/u was negative, note received 18    Abnormal stress echo    milan Bellamy 7/15/21, 21    Agatston coronary artery calcium score less than 100    72 at [place], sees Dr. Bellamy, last visit 7/15/21    Atherosclerosis of abdominal aorta (HCC)    at [facility]    Cancer (HCC)    COPD (chronic obstructive pulmonary disease) (HCC)    on cardiopulmonary stress test Dr. Bellamy    Diastolic dysfunction    going to [place], started on furosemide    Eczema    saw derm 10/13    Elevated liver enzymes    presumed to be due to lipitor    Encounter for eye exam     [facility], overdue 9/20    Esophageal reflux    meds prn    Ex-cigarette smoker    Heart failure with preserved ejection fraction (HCC)    dx at [place], lasix added    High blood pressure    High cholesterol    Dr. Bellamy managing    History of cardiovascular stress test    obstructive pulmonary component on cardiopulmonary stress test at [facility] ordered by Dr. Bellamy     History of Doppler echocardiogram    at [facility], Dr. Bellamy    History of normal resting EKG    at [facility]    History of nuclear stress test    neg at [place], EF 57%    Hypercalcemia    Iliac artery occlusion, right ([facility])    Dr. Cantu. had lithotripsy and stent, last US 8/4/21 at [facility]: aortic atherosclerosis, patent R iliac stent    Left atrial enlargement    Dr. Sonja Roe at [facility], EF 77%    Living will in place    Osteopenia    improved 2013, further improvement 2017, d/c med after 5 years, recheck 2022    PFO (patent foramen ovale) ([facility])    dx at [place], felt not to be clinically signicant. no tx needed    Pulmonary nodule, left    on Ct at [place], also with mildly enlarged nodes    PVD (peripheral vascular disease) ([facility])    saw Dr. Cantu, stent R iliac on 10/6/20, claudication on R side. , f/u 5/25/21    Refused influenza vaccine    she's considering for 2020    Screening for cardiovascular condition    normal CGXT with EF of 70%, see below for abn stress echo    Sinus bradycardia    with LAE       Social History:   Smoking: Quit 14 years ago  40 years X 1 ppd.    Alcohol:  None    Family History:   No family history of premature arthrosclerotic heart disease     Medications:   Scheduled:    metoprolol succinate ER  50 mg Oral Daily    rosuvastatin  20 mg Oral Nightly    heparin  5,000 Units Subcutaneous Q8H [facility]    lisinopril  20 mg Oral Daily    predniSONE  40 mg Oral Daily    Followed by    [START ON 11/23/2024] predniSONE  20 mg Oral Daily with breakfast    Followed by    [START ON 11/30/2024] predniSONE  10 mg Oral Daily with breakfast       Continuous  Infusion:       PRN Medications:     acetaminophen    acetaminophen **OR** HYDROcodone-acetaminophen **OR** HYDROcodone-acetaminophen    morphINE **OR** morphINE **OR** morphINE    ondansetron    melatonin    lidocaine PF    ropivacaine    EPINEPHrine    sodium chloride 0.9% PF    metoclopramide    hydrALAzine    pantoprazole    Outpatient Medications:   Medications Ordered Prior to Encounter[1]    Allergies:   Allergies[2]    Review of Systems:   No fevers, chills, change in weight or bowel habits.  Ten point review of systems is otherwise negative or unremarkable.    Physical Exam:   Vitals:    11/20/24 0828   BP: 143/63   Pulse: 54   Resp: 18   Temp: 98.1 °F (36.7 °C)     Wt Readings from Last 3 Encounters:   11/20/24 122 lb (55.3 kg)   10/08/24 118 lb 9.6 oz (53.8 kg)   04/09/24 137 lb (62.1 kg)           General: Well developed, well nourished female.  Pt is in no acute distress.  HEENT:   Normocephalic.  Atraumatic.  Eyes with no scleral icterus.  Neck: Supple.  No JVD.  Carotids 2+ and equal in symmetric fashion.  No bruits are noted.  Cardiac: Regular rate and rhythm.   There is a normal S1 and S2.  No S3 or S4.  No murmurs, rubs, or gallops.  PMI is non-displaced with a normal apical impulse.  Lungs: Clear to ascultation bilaterally.  No focal rales, rhonchi, or wheezes.  Good air movement is noted throughout all lung fields.  Abdomen: Soft.  Non-distended.  Non-tender.  Bowel sounds are present and normoactive.  No guarding or rebound.   Extremities: Extremities do not demonstrate any evidence of peripheral edema.   No cyanosis or clubbing of the digits is appreciated.  Femoral, Dorsalis Pedis, and Posterior Tibialis  pulses are 2+ and equal in a symmetric fashion.  Neurologic: Alert and oriented, normal affect.  No gross deficit appreciated.  Integument:  No visible rashes are appreciated.      Laboratories and Data:   Labs:    Recent Labs   Lab 11/19/24  1748 11/20/24  0356   * 110*   BUN 18 18    CREATSERUM 1.10* 0.94   CA 9.5 8.9   ALB 4.6  --    * 133*   K 4.8 4.4    104   CO2 24.0 25.0   ALKPHO 97  --    AST 26  --    ALT 39  --    BILT 0.7  --    TP 6.8  --        Recent Labs   Lab 11/19/24  1748 11/20/24  0356   RBC 4.05 3.44*   HGB 12.9 11.0*   HCT 38.5 32.2*   MCV 95.1 93.6   MCH 31.9 32.0   MCHC 33.5 34.2   RDW 16.5 16.1   NEPRELIM 23.67* 16.66*   WBC 25.4* 19.8*   .0 293.0       Recent Labs   Lab 11/19/24  1748   PTP 13.6   INR 1.03       No results for input(s): \"TROP\", \"CK\" in the last 168 hours.    Diagnostics:   Tele: SR  EKG: SR.  Minimal criteria for LVH.  Echo 2/24:    The left ventricle is normal in size.   LV systolic performance is normal.   No left ventricular regional wall motion abnormalities were noted.   There is normal left ventricular wall thickness.   There is trace pulmonic valvular regurgitation.   There is mild tricuspid regurgitation.   RV is poorly visualized (probably normal).   LA volume is within normal range for body size.   The right atrium is mildly dilated.       Assessment:   HTN  -Escalation of BP on presentation reflective of anxiety and pain  -Improved  2.     HL  3.     COPD   -O2 with activity  4.     PVD   -S/P R iliac stenting  5.    AAA Hx   -No dilatation reported on CT 10/24  6.    H/O PFO  7.    Metastatic lung CA  8.    Simple fall with R femoral neck Fx  9.  H/O DVT   -Chronic Eliquis        Plan:   Toprol XL 50mg PO qAM   Lisinopril 20mg PO qAM  -Titrate as hemodynamics dictate  3.     Statin  4.     Eliquis on hold  5.     Surgical intervention planned.  Mildly elevated beverly-operative risk from a cardiac perspective given limited functional status.  No active angina, CHF, or complex arrhythmia.  Pt acknowledges this risk and agrees to proceed with surgery as planned, accepting of risks.  6.  Tele monitor  7.  PRN Hydralazine  8.  Pain control    Tristian Rey MD  11/20/2024  10:42 AM         [1]   Current Facility-Administered  Medications on File Prior to Encounter   Medication Dose Route Frequency Provider Last Rate Last Admin    [COMPLETED] potassium chloride (Klor-Con M20) tab 40 mEq  40 mEq Oral Once Latoya Mcdonald MD   40 mEq at 10/08/24 0834    [COMPLETED] magnesium oxide (Mag-Ox) tab 400 mg  400 mg Oral Once Matthew, Ali, DO   400 mg at 10/08/24 1046    [COMPLETED] potassium chloride (Klor-Con M20) tab 40 mEq  40 mEq Oral Once Matthew, Ali, DO   40 mEq at 10/08/24 1046    [COMPLETED] potassium phosphate dibasic 15 mmol in sodium chloride 0.9% 250 mL IVPB  15 mmol Intravenous Once Ophelia Miranda MD   Stopped at 10/07/24 1219    Followed by    [COMPLETED] potassium chloride 20 mEq/100mL IVPB premix 20 mEq  20 mEq Intravenous Once Ophelia Miranda MD 50 mL/hr at 10/07/24 1219 20 mEq at 10/07/24 1219    [COMPLETED] potassium phosphate dibasic 15 mmol in sodium chloride 0.9% 250 mL IVPB  15 mmol Intravenous Once Ophelia Miranda MD   Stopped at 10/06/24 1200    Followed by    [COMPLETED] potassium chloride 40 mEq in 250mL sodium chloride 0.9% IVPB premix  40 mEq Intravenous Once Ophelia Miranda MD 62.5 mL/hr at 10/06/24 1202 40 mEq at 10/06/24 1202    [COMPLETED] magnesium sulfate in sterile water for injection 2 g/50mL IVPB premix 2 g  2 g Intravenous Once Latoya Mcdonald MD   Stopped at 10/06/24 1311    [COMPLETED] potassium chloride 40 mEq in 250mL sodium chloride 0.9% IVPB premix  40 mEq Intravenous Once Ophelia Miranda MD 62.5 mL/hr at 10/06/24 2211 40 mEq at 10/06/24 2211    [COMPLETED] sodium chloride 0.9 % IV bolus 1,000 mL  1,000 mL Intravenous Once Asiya Jacinto DO   Stopped at 10/05/24 1019    [COMPLETED] potassium chloride (Klor-Con M20) tab 40 mEq  40 mEq Oral Q4H Ophelia Miranda MD   40 mEq at 10/06/24 0223    [COMPLETED] magnesium oxide (Mag-Ox) tab 800 mg  800 mg Oral Once Ophelia Miranda MD   800 mg at 10/05/24 4849     Current Outpatient Medications on File Prior to Encounter    Medication Sig Dispense Refill    predniSONE 20 MG Oral Tab Take 2 tablets (40 mg total) by mouth daily.      lisinopril 20 MG Oral Tab Take 1 tablet (20 mg total) by mouth daily.      apixaban 5 MG Oral Tab Take 2 tabs (10mg) by mouth twice daily for 7 days, then take 1 tab (5mg) by mouth twice daily thereafter. (Patient taking differently: Take 1 tablet (5 mg total) by mouth 2 (two) times daily. Take 2 tabs (10mg) by mouth twice daily for 7 days, then take 1 tab (5mg) by mouth twice daily thereafter.) 74 tablet 0    metoprolol succinate 50 MG Oral Tablet 24 Hr Take 1 tablet (50 mg total) by mouth daily. 90 tablet 3    Rosuvastatin Calcium 20 MG Oral Tab Take 1 tablet (20 mg total) by mouth nightly.      OMEPRAZOLE 20 MG Oral Capsule Delayed Release TAKE 1 CAPSULE(20 MG) BY MOUTH DAILY (Patient taking differently: Take 1 capsule (20 mg total) by mouth as needed.) 90 capsule 3    ASPIRIN 81 MG OR TABS Take 1 tablet (81 mg total) by mouth daily.      FISH OIL 1000 MG OR CAPS Take 1,000 mg by mouth daily.      Albuterol Sulfate  (90 Base) MCG/ACT Inhalation Aero Soln 2 puffs 15 minutes before exercise. (Patient not taking: Reported on 11/19/2024) 1 Inhaler 12   [2] No Known Allergies

## 2024-11-20 NOTE — ED QUICK NOTES
Orders for admission, patient is aware of plan and ready to go upstairs. Any questions, please call ED RN mina at extension [phone].     Patient Covid vaccination status: Fully vaccinated and immunocompromised     COVID Test Ordered in ED: None    COVID Suspicion at Admission: N/A    Running Infusions:  None    Mental Status/LOC at time of transport: A/Ox4    Other pertinent information:   CIWA score: N/A   NIH score:  N/A

## 2024-11-20 NOTE — PROGRESS NOTES
Patient is saline locked, on room air, on telemetry running SR/SB, purewick in place, Morphine given for pain, bedrest due to hip fracture, plan for surgery tonight, NPO, Hydralazine given for elevated blood pressure, magnesium replaced per protocol, US Kidney/Bladder ordered-will likely be done tomorrow. Patient and family updated on plan of care.       1649: Patient transported to surgery in stable condition. Consent signed, pre-op checklist completed. Family accompanied at bedside.

## 2024-11-20 NOTE — PROGRESS NOTES
NURSING ADMISSION NOTE      Patient admitted via Cart  Oriented to room.  Safety precautions initiated.  Bed in low position.  Call light in reach.  2 person skin check completed with PCT Pratibha. No skin breakdown noted at this time.

## 2024-11-20 NOTE — PROGRESS NOTES
[facility] Hospitalist Progress Note                                                                     [facility]   part of [facility]  12/21/1945    SUBJECTIVE: Patient denies any chest pain, palpitations, shortness of breath, cough, nausea, vomiting, abdominal pain.     OBJECTIVE:  Temp:  [96.9 °F (36.1 °C)-98.4 °F (36.9 °C)] 98.1 °F (36.7 °C)  Pulse:  [54-72] 54  Resp:  [14-28] 18  BP: (139-203)/(56-95) 143/63  SpO2:  [94 %-100 %] 96 %  Exam  Gen: No acute distress, alert and oriented  Pulm: Lungs clear bilaterally, normal respiratory effort, no crackles, no wheezing  CV: Heart with regular rate and rhythm, no murmur.  Abd: Abdomen soft, nontender, nondistended, bowel sounds present  MSK: No significant pitting edema or tenderness of the LE  Skin: no new rashes or lesions    Labs:   Recent Labs   Lab 11/19/24 1748 11/20/24  0356   WBC 25.4* 19.8*   HGB 12.9 11.0*   MCV 95.1 93.6   .0 293.0   INR 1.03  --        Recent Labs   Lab 11/19/24 1748 11/20/24  0356   * 133*   K 4.8 4.4    104   CO2 24.0 25.0   BUN 18 18   CREATSERUM 1.10* 0.94   CA 9.5 8.9   MG  --  1.8   * 110*       Recent Labs   Lab 11/19/24  1748   ALT 39   AST 26   ALB 4.6       No results for input(s): \"PGLU\" in the last 168 hours.    Meds:   Scheduled:    metoprolol succinate ER  50 mg Oral Daily    rosuvastatin  20 mg Oral Nightly    heparin  5,000 Units Subcutaneous Q8H [facility]    lisinopril  20 mg Oral Daily    predniSONE  40 mg Oral Daily    Followed by    [START ON 11/23/2024] predniSONE  20 mg Oral Daily with breakfast    Followed by    [START ON 11/30/2024] predniSONE  10 mg Oral Daily with breakfast     Continuous Infusions:   PRN:   acetaminophen    acetaminophen **OR** HYDROcodone-acetaminophen **OR** HYDROcodone-acetaminophen    morphINE **OR** morphINE **OR** morphINE    ondansetron    melatonin    lidocaine PF    ropivacaine     EPINEPHrine    sodium chloride 0.9% PF    metoclopramide    hydrALAzine    pantoprazole    ASSESSMENT / PLAN:   78 year old female with history of AAA, ??COPD, Diastolic dysfunction, gerd, PAD, HTN, HLD, hx PFO, lung cancer on immunotherapy presenting with a mechanical fall and right leg pain.      Right femoral neck fracture  -ortho consulted--> OR today   -prn iv morphine   -prn po norco\     HTN  -sbp elevated--> imporved  -continue metoprolol  -continue lisinopril   -iv hydralazine prn for sbp< 160  -consult cardiology for management     HLD  -rosuvastatin     PAD  -asa on hold  -statin     Hx PE  -eliquis on hold due to fx and surgery in near future     GERD  -pantoprazole     Recent immunotherapy induced kidney injury  -continue prednisone if ok with ortho     Leukocytosis--> improved  -no obvious infection  -likely leukemoid reaction to fracture and prednisone use.      Preoperative Eval  -pt denies any active cardiac or pulmonary pathology  -pt with no significant cardiac hx and was told she actually does not have copd  -pt lung and cardiac evaluation within acceptable limits  -consult cardiology for preop eval given elevated BP and hx diastolic HF  -if ok with cardiology no further cardiac or pulmonary eval needed prior to surgical intervention      Quality:  DVT Prophylaxis: scd, heparin subcutaneous(hold for surgery)  CODE status: Full  Matos: jostin     Plan of care discussed with patient, daughter and staff     Dispo: no discharge.      John Womack MD  [facility] Hospitalist  [phone]

## 2024-11-20 NOTE — ANESTHESIA PROCEDURE NOTES
Regional Block    Date/Time: 11/19/2024 7:10 PM    Performed by: Gaurav Degroot DO  Authorized by: Gaurav Degroot DO      General Information and Staff    Start Time:  11/19/2024 7:10 PM  End Time:  11/19/2024 7:14 PM  Anesthesiologist:  Gaurav Degroot DO  Performed by:  Anesthesiologist  Patient Location:  ED    Block Placement: Pre Induction  Site Identification: real time ultrasound guided and image stored and retrievable    Block site/laterality marked before start: site marked  Reason for Block: procedure for pain and at request of ED Physician    Preanesthetic Checklist: 2 patient identifers, IV checked, risks and benefits discussed, monitors and equipment checked, pre-op evaluation, timeout performed, anesthesia consent, sterile technique used, no prohibitive neurological deficits and no local skin infection at insertion site      Procedure Details    Patient Position:  Supine  Prep: ChloraPrep    Monitoring:  Cardiac monitor, continuous pulse ox and blood pressure cuff  Block Type:  Femoral  Laterality:  Right  Injection Technique:  Single-shot    Needle    Needle Type:  Short-bevel and echogenic  Needle Gauge:  21 G  Needle Length:  90 mm  Reason for Ultrasound Use: appropriate spread of the medication was noted in real time and no ultrasound evidence of intravascular and/or intraneural injection            Assessment    Injection Assessment:  Good spread noted, negative aspiration for heme, negative resistance, no pain on injection, incremental injection, local visualized surrounding nerve on ultrasound and low pressure  Heart Rate Change: No    - Patient tolerated block procedure well without evidence of immediate block related complications.     Medications  11/19/2024 7:10 PM      Additional Comments    Medication:  Bupivacaine 0.375% 20mL    Tolerated well

## 2024-11-20 NOTE — PLAN OF CARE
Pt A&Ox4, VSS on RA. Tele NSR. SCDs on/off. Pain managed with PRN pain medication. Last BM 11/19, voiding via purewick. NPO for R hip hemiarthroplasty @ 0520 11/20 with Dr. Anand. Pre op checklist complete, unsigned consent placed on chart - pt prefers to speak with surgeon before signing consent. US kidneys and bladder pending. Call light in reach, safety measures in place.

## 2024-11-20 NOTE — ANESTHESIA PROCEDURE NOTES
Airway  Date/Time: 11/20/2024 5:24 PM  Urgency: elective    Airway not difficult    General Information and Staff    Patient location during procedure: OR  Anesthesiologist: Doug Ohara MD  Performed: anesthesiologist   Performed by: Doug Ohara MD  Authorized by: Doug Ohara MD      Indications and Patient Condition  Indications for airway management: anesthesia  Spontaneous Ventilation: absent  Sedation level: deep  Preoxygenated: yes  Patient position: sniffing  MILS not maintained throughout  Mask difficulty assessment: 0 - not attempted  No planned trial extubation    Final Airway Details  Final airway type: endotracheal airway      Successful airway: ETT  Cuffed: yes   Successful intubation technique: direct laryngoscopy  Facilitating devices/methods: intubating stylet  Endotracheal tube insertion site: oral  Blade: Keller  Blade size: #2  ETT size (mm): 7.0    Cormack-Lehane Classification: grade I - full view of glottis  Placement verified by: capnometry   Cuff volume (mL): 7  Measured from: lips  ETT to lips (cm): 22  Number of attempts at approach: 1  Number of other approaches attempted: 0

## 2024-11-20 NOTE — ANESTHESIA PREPROCEDURE EVALUATION
PRE-OP EVALUATION    Patient Name: Mojgan Jefferson    Admit Diagnosis: No admission diagnoses are documented for this encounter.    Pre-op Diagnosis: Displaced fracture of right femoral neck (HCC) [S72.001A]    RIGHT HIP HEMIARTHROPLASTY/ BIPOLAR    Anesthesia Procedure: RIGHT HIP HEMIARTHROPLASTY/ BIPOLAR (Right)    Surgeons and Role:     * Andrew Anand MD - Primary    Pre-op vitals reviewed.  Temp: 96.9 °F (36.1 °C)  Pulse: 60  Resp: 24  BP: 178/79  SpO2: 97 %  Body mass index is 23.05 kg/m².    Current medications reviewed.  Hospital Medications:   lidocaine PF (Xylocaine-MPF) 1% injection  2 mL Injection PRN    ropivacaine (Naropin) 0.5% injection  30 mL Injection PRN    EPINEPHrine (Adrenalin) 1 MG/ML injection (Allergic Reaction Kit) 1 mg  1 mg Injection PRN    sodium chloride 0.9% PF injection 10 mL  10 mL Injection PRN       Outpatient Medications:   Prescriptions Prior to Admission[1]    Allergies: Patient has no known allergies.      Anesthesia Evaluation    Patient summary reviewed.    Anesthetic Complications  (-) history of anesthetic complications         GI/Hepatic/Renal  Comment: Recent admit to hospital/ICU for weakness, PRINCESS, resolved, followup with urology regarding hydronephrosis    (+) GERD and well controlled      (+) chronic renal disease                    Cardiovascular  Comment: Conclusions on echo 3/24    1. Left ventricle: The cavity size was normal. Wall thickness was normal.      The estimated ejection fraction was 65-70%. Left ventricular diastolic      function parameters were normal for the patient's age.   2. Left atrium: The left atrial volume was normal.   3. No significant valvular disease   *       PAD - iliac artery stent    ECG reviewed.            (+) hypertension   (+) hyperlipidemia  (+) CAD                (+) CHF                Endo/Other                                  Pulmonary  Comment: Quit smoking 2011, metatstatic lung CA, on immunotherapy      (+) COPD and  moderate  COPD requiring home oxygen.                Neuro/Psych                              On AC for PE- apixaban, last dose         Past Surgical History:   Procedure Laterality Date    Colonoscopy N/A 2015    Procedure: COLONOSCOPY;  Surgeon: Garrett Kim MD;  Location:  ENDOSCOPY          x3 with anesthesia    Stent place iliac art o/p ini  10/06/2020    KARENA Cantu     Social History     Socioeconomic History    Marital status:     Number of children: 3   Occupational History    Occupation: retired    Tobacco Use    Smoking status: Former     Current packs/day: 0.00     Average packs/day: 0.3 packs/day for 40.0 years (12.0 ttl pk-yrs)     Types: Cigarettes     Start date: 1971     Quit date: 2011     Years since quittin.3    Smokeless tobacco: Never   Vaping Use    Vaping status: Never Used   Substance and Sexual Activity    Alcohol use: Yes     Alcohol/week: 0.0 - 7.0 standard drinks of alcohol     Comment: 1 glass of wine daily    Drug use: No    Sexual activity: Not Currently     Partners: Male   Other Topics Concern     Service No    Blood Transfusions No    Caffeine Concern No    Occupational Exposure No    Hobby Hazards No    Sleep Concern Yes     Comment: trouble sleeping, poss due to anxiety    Stress Concern Yes     Comment: occassionally    Weight Concern No    Special Diet No    Back Care No    Exercise Yes     Comment: very active at home, goes to fitness center, walking    Seat Belt Yes    Self-Exams Yes     History   Drug Use No     Available pre-op labs reviewed.  Lab Results   Component Value Date    WBC 25.4 (H) 2024    RBC 4.05 2024    HGB 12.9 2024    HCT 38.5 2024    MCV 95.1 2024    MCH 31.9 2024    MCHC 33.5 2024    RDW 16.5 2024    .0 2024     Lab Results   Component Value Date     (L) 2024    K 4.8 2024     2024    CO2 24.0 2024     BUN 18 11/19/2024    CREATSERUM 1.10 (H) 11/19/2024     (H) 11/19/2024    CA 9.5 11/19/2024     Lab Results   Component Value Date    INR 1.03 11/19/2024         Airway      Mallampati: II  Mouth opening: 3 FB  TM distance: > 6 cm  Neck ROM: limited Cardiovascular      Rhythm: regular  Rate: normal     Dental             Pulmonary      Breath sounds clear to auscultation bilaterally.               Other findings              ASA: 3              Comment: Chart review 11/19, femoral block performed at 1930  Plan/risks discussed with: patient and child/children                Present on Admission:  **None**             [1] (Not in a hospital admission)

## 2024-11-21 ENCOUNTER — APPOINTMENT (OUTPATIENT)
Dept: CARDIAC REHAB | Facility: HOSPITAL | Age: 79
End: 2024-11-21
Attending: INTERNAL MEDICINE
Payer: MEDICARE

## 2024-11-21 ENCOUNTER — APPOINTMENT (OUTPATIENT)
Dept: ULTRASOUND IMAGING | Facility: HOSPITAL | Age: 79
End: 2024-11-21
Attending: ORTHOPAEDIC SURGERY
Payer: MEDICARE

## 2024-11-21 ENCOUNTER — APPOINTMENT (OUTPATIENT)
Dept: ULTRASOUND IMAGING | Facility: HOSPITAL | Age: 79
DRG: 522 | End: 2024-11-21
Attending: ORTHOPAEDIC SURGERY
Payer: MEDICARE

## 2024-11-21 LAB
ANION GAP SERPL CALC-SCNC: 7 MMOL/L (ref 0–18)
ANION GAP SERPL CALC-SCNC: 8 MMOL/L (ref 0–18)
BASOPHILS # BLD AUTO: 0.03 X10(3) UL (ref 0–0.2)
BASOPHILS NFR BLD AUTO: 0.1 %
BUN BLD-MCNC: 20 MG/DL (ref 9–23)
BUN BLD-MCNC: 25 MG/DL (ref 9–23)
CALCIUM BLD-MCNC: 8.5 MG/DL (ref 8.7–10.4)
CALCIUM BLD-MCNC: 8.7 MG/DL (ref 8.7–10.4)
CHLORIDE SERPL-SCNC: 101 MMOL/L (ref 98–112)
CHLORIDE SERPL-SCNC: 104 MMOL/L (ref 98–112)
CO2 SERPL-SCNC: 21 MMOL/L (ref 21–32)
CO2 SERPL-SCNC: 23 MMOL/L (ref 21–32)
CREAT BLD-MCNC: 1.35 MG/DL
CREAT BLD-MCNC: 2 MG/DL
EGFRCR SERPLBLD CKD-EPI 2021: 25 ML/MIN/1.73M2 (ref 60–?)
EGFRCR SERPLBLD CKD-EPI 2021: 40 ML/MIN/1.73M2 (ref 60–?)
EOSINOPHIL # BLD AUTO: 0.01 X10(3) UL (ref 0–0.7)
EOSINOPHIL NFR BLD AUTO: 0 %
ERYTHROCYTE [DISTWIDTH] IN BLOOD BY AUTOMATED COUNT: 16.2 %
GLUCOSE BLD-MCNC: 143 MG/DL (ref 70–99)
GLUCOSE BLD-MCNC: 207 MG/DL (ref 70–99)
HCT VFR BLD AUTO: 31.4 %
HGB BLD-MCNC: 10.4 G/DL
IMM GRANULOCYTES # BLD AUTO: 0.2 X10(3) UL (ref 0–1)
IMM GRANULOCYTES NFR BLD: 0.8 %
LYMPHOCYTES # BLD AUTO: 1.2 X10(3) UL (ref 1–4)
LYMPHOCYTES NFR BLD AUTO: 4.8 %
MAGNESIUM SERPL-MCNC: 1.8 MG/DL (ref 1.6–2.6)
MCH RBC QN AUTO: 31.8 PG (ref 26–34)
MCHC RBC AUTO-ENTMCNC: 33.1 G/DL (ref 31–37)
MCV RBC AUTO: 96 FL
MONOCYTES # BLD AUTO: 1.17 X10(3) UL (ref 0.1–1)
MONOCYTES NFR BLD AUTO: 4.7 %
NEUTROPHILS # BLD AUTO: 22.35 X10 (3) UL (ref 1.5–7.7)
NEUTROPHILS # BLD AUTO: 22.35 X10(3) UL (ref 1.5–7.7)
NEUTROPHILS NFR BLD AUTO: 89.6 %
OSMOLALITY SERPL CALC.SUM OF ELEC: 280 MOSM/KG (ref 275–295)
OSMOLALITY SERPL CALC.SUM OF ELEC: 283 MOSM/KG (ref 275–295)
PLATELET # BLD AUTO: 297 10(3)UL (ref 150–450)
POTASSIUM SERPL-SCNC: 5 MMOL/L (ref 3.5–5.1)
POTASSIUM SERPL-SCNC: 5.3 MMOL/L (ref 3.5–5.1)
RBC # BLD AUTO: 3.27 X10(6)UL
SODIUM SERPL-SCNC: 130 MMOL/L (ref 136–145)
SODIUM SERPL-SCNC: 134 MMOL/L (ref 136–145)
WBC # BLD AUTO: 25 X10(3) UL (ref 4–11)

## 2024-11-21 PROCEDURE — 97530 THERAPEUTIC ACTIVITIES: CPT

## 2024-11-21 PROCEDURE — 80048 BASIC METABOLIC PNL TOTAL CA: CPT | Performed by: ORTHOPAEDIC SURGERY

## 2024-11-21 PROCEDURE — 76770 US EXAM ABDO BACK WALL COMP: CPT | Performed by: ORTHOPAEDIC SURGERY

## 2024-11-21 PROCEDURE — 97116 GAIT TRAINING THERAPY: CPT

## 2024-11-21 PROCEDURE — 97161 PT EVAL LOW COMPLEX 20 MIN: CPT

## 2024-11-21 PROCEDURE — 85025 COMPLETE CBC W/AUTO DIFF WBC: CPT | Performed by: ORTHOPAEDIC SURGERY

## 2024-11-21 PROCEDURE — 97165 OT EVAL LOW COMPLEX 30 MIN: CPT

## 2024-11-21 PROCEDURE — 80048 BASIC METABOLIC PNL TOTAL CA: CPT | Performed by: HOSPITALIST

## 2024-11-21 PROCEDURE — 83735 ASSAY OF MAGNESIUM: CPT | Performed by: ORTHOPAEDIC SURGERY

## 2024-11-21 PROCEDURE — 97535 SELF CARE MNGMENT TRAINING: CPT

## 2024-11-21 RX ORDER — ASPIRIN 81 MG/1
81 TABLET, CHEWABLE ORAL DAILY
Status: DISCONTINUED | OUTPATIENT
Start: 2024-11-21 | End: 2024-11-23

## 2024-11-21 RX ORDER — MAGNESIUM OXIDE 400 MG/1
400 TABLET ORAL ONCE
Status: COMPLETED | OUTPATIENT
Start: 2024-11-21 | End: 2024-11-21

## 2024-11-21 NOTE — PLAN OF CARE
Pt A&Ox4, VSS on 2L O2 via NC. IS encouraged. Tele NSR/SB. SCDs and heparin. Denies pain at this time. Surgical dressing intact with a small amount of sanguinous drainage noted. Abductor pillow in place. Last BM 11/20, voiding via purewick. WBAT, pt still lethargic from surgery - will attempt ambulation when more awake. PT/OT to see. Plan to DC when medically cleared. Call light in reach, safety measures in place.  2 person skin check completed with ROSSI Dimas, No skin breakdown noted at this time, surgical incision to R hip.    0630: Pt able to ambulate this morning up to chair for breakfast min assist with walker and gait belt.

## 2024-11-21 NOTE — PHYSICAL THERAPY NOTE
PHYSICAL THERAPY TREATMENT NOTE - INPATIENT    Room Number: 365/365-A     Session: 1     Number of Visits to Meet Established Goals: 3    Presenting Problem: R femoral neck fracture s/p R hip hemiarthroplasty 11/20/24  Co-Morbidities : HTN, osteopenia, CAD, CHF, AAA, PAD, COPD, PE, lung CA    PHYSICAL THERAPY ASSESSMENT   Patient demonstrates good  progress this session, goals  progressing with 4/5 met this session.      Patient is requiring stand-by assist as a result of the following impairments:  due to posterior hip precautions, surgical medical status and dec strength  . BP stable during session.   PT will sign off at this time.     Patient continues to benefit from continued skilled PT services: at discharge to promote prior level of function and safety with additional support and return home with home health PT.    PLAN DURING HOSPITALIZATION  Nursing Mobility Recommendation : 1 Assist  PT Device Recommendation: Rolling walker  PT Treatment Plan: Bed mobility;Endurance;Energy conservation;Patient education;Family education;Gait training;Strengthening;Balance training;Stoop training  Frequency (Obs): Daily     CURRENT GOALS     Goal #1  Patient is able to demonstrate supine - sit EOB @ level: supervision  OT to address.    Goal #2  Patient is able to demonstrate transfers Sit to/from Stand at assistance level: supervision  MET   Goal #3    Patient is able to ambulate 150 feet with assistive device at assistance level: supervision  MET   Goal #4    Patient will negotiate one curb w/ assistive device and supervision  MET   Goal #5    Patient verbalizes and/or demonstrates all precautions and safety concerns independently   MET   Goal #6      Goal Comments: Goals established on 11/21/2024 11/21/2024 all goals achieved     SUBJECTIVE  \" I don't have any pain.\"    OBJECTIVE  Precautions: MYAH - posterior;Hip abduction pillow    WEIGHT BEARING RESTRICTION  R Lower Extremity: Weight Bearing as Tolerated    PAIN  ASSESSMENT   Ratin  Location: denied  Management Techniques: Activity promotion    BALANCE                                                                                                                       Static Sitting: Good  Dynamic Sitting: Good           Static Standing: Fair -  Dynamic Standing: Poor +    ACTIVITY TOLERANCE           BP: 104/39        Patient Position: Sitting    O2 WALK       AM-PAC '6-Clicks' INPATIENT SHORT FORM - BASIC MOBILITY  How much difficulty does the patient currently have...  Patient Difficulty: Turning over in bed (including adjusting bedclothes, sheets and blankets)?: A Little   Patient Difficulty: Sitting down on and standing up from a chair with arms (e.g., wheelchair, bedside commode, etc.): A Little   Patient Difficulty: Moving from lying on back to sitting on the side of the bed?: A Little   How much help from another person does the patient currently need...   Help from Another: Moving to and from a bed to a chair (including a wheelchair)?: A Little   Help from Another: Need to walk in hospital room?: A Little   Help from Another: Climbing 3-5 steps with a railing?: A Little     AM-PAC Score:  Raw Score: 18   Approx Degree of Impairment: 46.58%   Standardized Score (AM-PAC Scale): 43.63   CMS Modifier (G-Code): CK    FUNCTIONAL ABILITY STATUS  Gait Assessment   Functional Mobility/Gait Assessment  Gait Assistance: Supervision  Distance (ft): 325  Assistive Device: Rolling walker  Pattern: R Decreased stance time  Stairs: Stairs;Stoop/curb  How Many Stairs: 4  Device: 2 Rails  Assist: Supervision  Pattern: Ascend and Descend  Ascend and Descend : Step to  Stoop/Curb: RW and SBA    Skilled Therapy Provided    Bed Mobility:  Rolling: NT   Supine<>Sit: NT   Sit<>Supine: slight assist to clear heel.      Transfer Mobility:  Sit<>Stand: SBA and cues for technique.    Stand<>Sit: SBA   Gait: SBA and RW. Maintained proper step through pattern and able to increase distance,  make three point turn while maintaining post precautions.     Therapist's Comments: Stair, stoop training provided, pt needs assistive device and sup for safety. Car transfer training completed with being able to get leg in and out of tub, tub/bench used to simulate activity.   Educated on use of ice pack for pain and edema control.   Hip precaution hand out provided.   BP in reclined sitting 107/44  Standing 90/68  Post activity 117/44  No dizziness reported.     THERAPEUTIC EXERCISES  Lower Extremity Ankle pumps  LAQ     Upper Extremity      Position Sitting     Repetitions   10   Sets   1           Patient End of Session: Up in chair;With  staff;Needs met;Call light within reach;RN aware of session/findings;All patient questions and concerns addressed;Hospital anti-slip socks;Family present;Discussed recommendations with /    PT Session Time: 32 minutes  Gait Trainin minutes  Therapeutic Activity: 10 minutes  Therapeutic Exercise: 3 minutes   Neuromuscular Re-education: 0 minutes

## 2024-11-21 NOTE — HOME CARE LIAISON
Received referral via [facility] for Home Health services. Spoke w/ patient at the bedside along with her daughter and is agreeable for home health . Updated demographics at the bedside. Patient unsure she will stay at her home or with one of her daughter. Patient indicated she will call write the day of discharge with confirmed address

## 2024-11-21 NOTE — CM/SW NOTE
11/21/24 1000   CM/SW Referral Data   Referral Source Social Work (self-referral)   Reason for Referral Discharge planning   Informant Patient;Daughter;EMR;Clinical Staff Member   Patient Info   Patient's Current Mental Status at Time of Assessment Alert;Oriented   Patient lives with Alone   Patient Status Prior to Admission   Independent with ADLs and Mobility Yes   Discharge Needs   Anticipated D/C needs Home health care   Choice of Post-Acute Provider   Informed patient of right to choose their preferred provider Yes   List of appropriate post-acute services provided to patient/family with quality data No - Declined list   Patient/family choice [facility]   Information given to Patient;Daughter   [facility]osp[facility] financial disclosure given Yes       HOME SITUATION per PT eval  Type of Home: House   Home Layout: One level  Stairs to Enter : 1  Lives With: Alone  Drives: Yes     Prior Level of [place] per PT eval: Pt lives alone in single story home. Pt independent and active. Pt ambulates with rollator in the community primarily to hold 02 tank. Pt is able to stay with one of her daughters at discharge. Pt would stay on the main level of the home.        Patient is an 77 y/o woman admitted with hip fracture now s/p THR.  PT/OT recommending HHC at discharge.  Met with pt and her dtrDeena at bedside to discuss DC planning.  Pt/family in agreement with plan for home with HH.  Pt's dtrs planning to provide initial 24 hour supervision/assist.  Pt will either discharge to her home or to stay with one of her daughters.  Pt stated she has had [facility] in the past and prefers this agency.  No other DC needs identified at this time.    Referral sent to  via [facility].  Await confirmation of acceptance.    Monik Cardenas [facility]  Discharge Planner  [phone]    Addendum:  Received confirmation from  that pt can be accepted.

## 2024-11-21 NOTE — PLAN OF CARE
POD 1 R hip hemiarthroplasty. Aquacel to R hip dry intact, old drainage.  Patient denies pain, n/t.  Voiding without difficulty.  Medications given as ordered.  500mL 0.9 bolus given for hypotension per MD, recheck 102/36-told to monitor.  Plan to monitor vital, labs, and discharge to home with [facility] when ready.

## 2024-11-21 NOTE — OCCUPATIONAL THERAPY NOTE
OCCUPATIONAL THERAPY TREATMENT NOTE - INPATIENT     Room Number: 365/365-A  Session: 1   Number of Visits to Meet Established Goals: 2    Presenting Problem: mechanical fall with R hip fx, s/p R hip hemiarthroplasty 11/20/24    ASSESSMENT   Patient seen for additional session per RN request as patient potentially discharging home this pm. Patient demonstrates good  progress this session, goals remain in progress.    Patient continues to function near baseline with toileting, lower body dressing, bed mobility, transfers, and dynamic standing balance.   Contributing factors to remaining limitations include decreased functional strength, decreased endurance, impaired standing balance, decreased muscular endurance, and difficulty maintaining precautions.  Next session anticipate patient to progress toileting, lower body dressing, bed mobility, transfers, and dynamic sitting balance.  Occupational Therapy will continue to follow patient for duration of hospitalization.    Patient continues to benefit from continued skilled OT services: at discharge to promote prior level of function.  Anticipate patient will return home with home health OT.     History Related to Current Admission: Patient is a 78 year old female admitted on 11/19/2024 with Presenting Problem: mechanical fall with R hip fx, s/p R hip hemiarthroplasty 11/20/24. Co-Morbidities : HTN, osteopenia, CAD, CHF, AAA, PAD, COPD, PE, lung CA    WEIGHT BEARING RESTRICTION  R Lower Extremity: Weight Bearing as Tolerated      Recommendations for nursing staff:   Transfers: 1-person  Toileting location: bathroom    TREATMENT SESSION:  Patient Start of Session: chair    FUNCTIONAL TRANSFER ASSESSMENT  Sit to Stand: Chair  Chair: Supervision  Toilet Transfer: Supervision (standard height, light use of grab bar; reports has spoken with friend and will be able to borrow safety frame from her)    BED MOBILITY  Sit to Supine (OT): Supervision    BALANCE ASSESSMENT      FUNCTIONAL ADL ASSESSMENT  LB Dressing Standing: Supervision (simulated clothing management to/from waist)  Toileting Seated: Supervision  Toileting Standing: Supervision    ACTIVITY TOLERANCE:   107/44 chair start of session (asymptomatic)  90/68 standing (asymptomatic)  117/44 sitting EOB end of session (asymptomatic)                         O2 SATURATIONS       EDUCATION PROVIDED  Patient Education : Role of Occupational Therapy; Functional Transfer Techniques; Fall Prevention; Weight Bear Status; Surgical Precautions; Proper Body Mechanics  Patient's Response to Education: Verbalized Understanding; Requires Further Education (daughter present (different daughter than this am))    Equipment used: RW  Demonstrates functional use, Would benefit from additional trial        Therapist comments: Patient seen for additional OT session per RN request due to possibility of patient discharging home this pm; daughter present throughout session, different daughter than was present in am session; per patient, still uncertain which daughter she might stay with at discharge, or whether one of her daughters might stay with her initially. Reviewed posterior hip precautions with handout left at bedside, patient requiring continued cueing, aware of suggestion to post reminders in higher-risk areas for breaking precautions such as where patient gets dressed and in the bathroom. Patient grossly supervision for standing, functional mobility to simpson and back into room/bathroom with RW to simulate distance to bathroom at home, toilet transfer, simulated toileting, and supine to sit. Patient leaving soon for imaging so did not dress at this time, however reviewed LB dressing techniques/equipment (reports has hip kit at home); will benefit from reinforcement if does not discharge today; supervision for sit to supine with increased time and cueing. Patient and daughter at bedside with no questions at this time. Will continue to follow.      Patient End of Session: In bed;Needs met;Call light within reach;RN aware of session/findings;All patient questions and concerns addressed;Hospital anti-slip socks;Alarm set;Family present    SUBJECTIVE  \"No, I'm not dizzy anymore!\"    PAIN ASSESSMENT  Ratin  Location: denies  Management Techniques: Activity promotion;Repositioning;Body mechanics     OBJECTIVE  Precautions: MYAH - posterior;Hip abduction pillow    AM-PAC ‘6-Clicks’ Inpatient Daily Activity Short Form  -   Putting on and taking off regular lower body clothing?: A Lot  -   Bathing (including washing, rinsing, drying)?: A Little (supervision)  -   Toileting, which includes using toilet, bedpan or urinal? : A Little (supervision)  -   Putting on and taking off regular upper body clothing?: None  -   Taking care of personal grooming such as brushing teeth?: None  -   Eating meals?: None    AM-PAC Score:  Score: 20  Approx Degree of Impairment: 38.32%  Standardized Score (AM-PAC Scale): 42.03    PLAN  OT Device Recommendations: TBD (safety frame)  OT Treatment Plan: Balance activities;ADL training;Functional transfer training;Endurance training;Patient/Family education;Patient/Family training;Compensatory technique education  Rehab Potential : Good  Frequency: 3x/week    ADL GOALS   Patient will perform Lower Body Dressing with supervision  Patient will perform Toileting with supervision --> MET     FUNCTIONAL TRANSFER GOALS   Patient will transfer Supine to Sit with supervision  Patient will transfer Sit to Supine with supervision --> MET   Patient will transfer to Toilet with supervision --> MET     ADDITIONAL GOALS   Patient will recall all hip precautions and incorporate into ADL tasks    OT Session Time: 35 minutes  Self-Care Home Management: 15 minutes  Therapeutic Activity: 20 minutes

## 2024-11-21 NOTE — PROGRESS NOTES
[facility] Orthopedics  Progress Note    Mojgan Jefferson Patient Status:  Inpatient    1945 MRN [id number]   [facility] 3SW-A Attending John Womack MD   Hosp Day # 2 PCP Guerrero Naik MD       Subjective:  POD #1 from right hip hemiarthroplasty/bipolar for a displaced fracture of the right femoral neck with Dr. Anand on 2024.    No overnight events.  The patient was seen resting comfortably and a recliner eating breakfast.  The patient's daughter, Deena is at bedside.  Patient states that overall she is doing very well.  She is not having much pain but does have some mild soreness to the lateral right hip. Denies fever, chills, chest pain, shortness of breath, calf pain        Objective:  Vital signs in last 24 hours:  Patient Vitals for the past 24 hrs:   BP Temp Temp src Pulse Resp SpO2   24 0405 103/45 98.4 °F (36.9 °C) Oral 58 17 93 %   240 100/49 97.7 °F (36.5 °C) Oral 51 15 97 %   24 114/64 97.6 °F (36.4 °C) Oral 60 18 95 %   24 -- -- -- 70 17 97 %   24 -- -- -- 65 15 97 %   24 109/60 -- -- 61 18 98 %   24 -- -- -- 64 13 97 %   24 -- -- -- 62 18 98 %   24 111/62 -- -- 62 17 97 %   24 -- -- -- 62 18 98 %   24 -- -- -- 59 17 98 %   24 107/57 97.3 °F (36.3 °C) -- 66 21 98 %   24 -- -- -- 66 17 99 %   24 -- -- -- 71 14 98 %   24 102/60 -- -- 77 19 98 %   24 99/66 -- -- 80 19 97 %   24 103/66 -- -- 87 15 100 %   24 134/83 97.8 °F (36.6 °C) Temporal 92 17 100 %   24 1218 (!) 162/48 98.4 °F (36.9 °C) Oral 55 19 93 %         General: A&Ox3, NAD.  Currently on room air.  Neuro: No focal deficits.  EHL/DF/PF intact.  Sensation intact to light touch throughout the lower extremity.  MSK:   On examination of the right lower extremity no obvious deformities.  Aquacel dressing  present to the right lateral hip is clean dry and intact.  Less than 5% saturation.  There is no surrounding erythema.  Mild postoperative swelling about the thigh but thigh compartments are soft and compressible without tenderness.  Extremity is of normal color and temperature.  Calf compartment is soft compressible without tenderness.  2+ DP and PT pulses.          Data Review  CBC:   Recent Labs   Lab 11/19/24 1748 11/20/24 0356 11/21/24  0446   RBC 4.05 3.44* 3.27*   HGB 12.9 11.0* 10.4*   HCT 38.5 32.2* 31.4*   MCV 95.1 93.6 96.0   MCH 31.9 32.0 31.8   MCHC 33.5 34.2 33.1   RDW 16.5 16.1 16.2   NEPRELIM 23.67* 16.66* 22.35*   WBC 25.4* 19.8* 25.0*   .0 293.0 297.0       Recent Labs   Lab 11/19/24 1748 11/20/24 0356 11/21/24  0446   * 110* 143*   BUN 18 18 20   CREATSERUM 1.10* 0.94 1.35*   EGFRCR 51* 61 40*   CA 9.5 8.9 8.7   ALB 4.6  --   --    * 133* 134*   K 4.8 4.4 5.0    104 104   CO2 24.0 25.0 23.0   ALKPHO 97  --   --    AST 26  --   --    ALT 39  --   --    BILT 0.7  --   --    TP 6.8  --   --          Microbiology data:  Hospital Encounter on 11/19/24   1. Blood Culture     Status: None (Preliminary result)    Collection Time: 11/19/24  7:01 PM    Specimen: Blood,peripheral   Result Value Ref Range    Blood Culture Result No Growth 1 Day N/A      XR HIP W OR WO PELVIS 1 VIEW, RIGHT (CPT=73501)    Result Date: 11/20/2024  CONCLUSION:  Status post placement of right hip prosthesis with anatomic alignment.   [place]   Dictated by (CST): Eric Santana MD on 11/20/2024 at 8:20 PM     Finalized by (CST): Eric Santana MD on 11/20/2024 at 8:21 PM      I independently reviewed the postoperative x-rays which demonstrates postsurgical changes consistent with right hemiarthroplasty.  The hip is appropriately reduced.  Prosthesis in appropriate position without evidence of mechanical failure.  No beverly-implant lucency.    Assessment/Plan:    This is an 78-year-old female who  sustained a right hip femoral neck fracture and is POD #1 right hip hemiarthroplasty/bipolar.      1) maintain surgical dressing and replace if saturation becomes greater than 50%.  2) PT/OT -weightbearing as tolerated on operative extremity with use protective device at all times.  3) posterior hip precautions with abduction pillow at all times with sleeping or in bed for 6 weeks postop.  4) May resume home Eliquis for DVT prophylaxis.  5) continue pain control  6) remaining medical care per primary service.  7) 24-hour postoperative prophylactic antibiotics.  4) Discharge plan: Pending evaluation by physical therapy regarding plan upon discharge from the hospital.  Patient will follow-up as outpatient in 2 weeks to remove sutures and for postoperative x-ray.        Regan Menjivar PA-C  [facility]  Orthopedic Surgery  11/21/2024  8:30 AM

## 2024-11-21 NOTE — OPERATIVE REPORT
Pre-Operative Diagnosis: Displaced fracture of right femoral neck (HCC) [S72.001A]     Post-Operative Diagnosis: Displaced fracture of right femoral neck (HCC) [S72.001A]      Procedure Performed:   RIGHT HIP HEMIARTHROPLASTY/ BIPOLAR     Surgeons and Role:     * Andrew Anand MD - Primary     Assistant(s):  Surgical Assistant: Regan eMnjivar PA-C      Skilled assistance was needed for patient positioning, prepping and draping, instrument holding and passing, retracting, and suturing.     Surgical Findings: Displaced femoral neck fracture     Specimen: Femoral head sent for routine path     Estimated Blood Loss: Blood Output: 150 mL      Indications for Procedure:  Mojgan Jefferson is an 78 year old female who suffered a right displaced femoral neck fracture as a result of a ground level fall. She was transported to the [facility] emergency room where radiographs demonstrated the fracture. She was admitted to the floor where medical clearance was obtained. An extensive discussion of surgical management was had with the patient and his family regarding operative vs nonoperative as well as operative treatment options including ORIF vs hemiarthroplasty, vs total hip arthroplasty. Given her medical co-morbidities, relatively low ambulatory demand, decreased surgical time/risk as well as decreased risk of post-operative instability hemiarthroplasty was agreed to be the best surgical option for the patient.     Description of Procedure:  The patient was taken to the operating room after the correct surgical site was marked in the preop holding area.  The patient was placed under anesthesia and placed in a lateral decubitus position on the standard orthopaedic table with peg board.  Preoperative antibiotics were given as well as preoperative TXA.  All bony prominences were padded.  The left hip was prepped and draped in usual, sterile surgical fashion. Pre-operative time-out confirming correct patient, correct  site, correct side, correct procedure, and that pre-operative antibiotics had been administered was performed.  Bony landmarks were palpated for incision and a standard posteriolateral approach was performed to the hip.  Skin incision was centered over the greater trochanter extending slightly posterior in the proximal extent.  Skin incision was injected with 0.25% Marcaine with epinephrine.  Skin was incised with knife, subcutaneous tissue dissected with Bovie cautery down to the level of the vastus fascia.  The hip was abducted and the fascia was incised with a knife and then the fascial incision was completed with the use of Bovie cautery.  Charnley retractor was then placed and the gluteus suraj was split in line with its fibers proximally with blunt finger dissection.  The hemorrhagic bursa was then removed from the posterior aspect of the femur revealing the short external rotators.  The short external rotators were then released with the use of Bovie cautery off of their insertion in the piriformis fossa and tagged as one sleeve with the joint capsule.  A capsulotomy was completed and a second tagging stitch was placed.  Care was taken so as to not injure the acetabular labrum during capsulotomy.  The femoral neck was removed with the use of corjaime and Andrés.  The femoral head was measured with a caliper and sized to 44 mm.  A trialing head was then placed within the acetabulum and found to have best fit at 44 mm.       Care was then turned to the femoral neck.  Osteotomy was performed with the use of an oscillating saw.  The femur was then exposed out of the wound with the use of a proximal femoral elevator.  The canal was then entered with the use of box osteotome followed by canal finder followed by a lateralizer.  The femur was then prepared with the use of broaching sequentially.  After broaching to a proper size, trial head and neck components were used to determine the stem offset and head  length.  Following trialing the broach was found to have good rotational control in the proximal femur.     Cement was mixed on the back table.  The femoral canal was brushed and a distal cement restrictor placed at an appropriate depth.  The canal was irrigated and dried. A epinephrine soak sponge was placed into the canal and removed. The canal was filled with cement using a cement gun in a retrograde fashion. The femoral component was placed with centralizer and excess cement removed.  The cement was allowed to fully cure.  The hip was then trialed and appropriately sized bipolar head were then confirmed.  The bipolar head was impacted onto the stem.  The hip was then reduced under direct visualization.  The wound was copiously irrigated and the tag sutures in the capsule and short external rotators were brought through drill holes on the posterior aspect of the greater trochanter.  These were then tied together with the hip in an abducted position taking tension off the repair. The vastus fascia was closed with absorbable suture.  Skin was then closed in layers with 0 Vicryl suture, 2-0 Monocryl, and staples.  Sterile dressing dressings were applied in the form of an Aquacel.  There were no complications and the procedure went as planned.  The surgical assistant was present for the entire procedure and assisted with the approach, exposure, definitive surgery and closure.    The patient left the operating room in stable condition.     mobilize with PT, WBAT on operative extremity with use of protective walker at all times  Posterior hip precautions with Abduction pillow at all times when sleeping or in bed for 6 weeks  XR L hip ordered for PACU  pain controlled with meds  Can restart home Eliquis POD 1  24 hours post-operative prophyllactic antibiotics    Andrew Anand MD  11/20/24

## 2024-11-21 NOTE — BRIEF OP NOTE
Pre-Operative Diagnosis: Displaced fracture of right femoral neck (HCC) [S72.001A]     Post-Operative Diagnosis: Displaced fracture of right femoral neck (HCC) [S72.001A]      Procedure Performed:   RIGHT HIP HEMIARTHROPLASTY/ BIPOLAR    Surgeons and Role:     * Andrew Anand MD - Primary    Assistant(s):  PA: Regan Menjivar PA-C     Surgical Findings: See detailed operative report     Specimen: None     Estimated Blood Loss: Blood Output: 150 mL (11/20/2024  6:40 PM)      Andrew Anand MD  11/20/2024  6:57 PM

## 2024-11-21 NOTE — PHYSICAL THERAPY NOTE
PHYSICAL THERAPY JOINT EVALUATION - INPATIENT     Room Number: 365/365-A  Evaluation Date: 11/21/2024  Type of Evaluation: Initial  Physician Order: PT Eval and Treat    Presenting Problem: R femoral neck fracture s/p R hip hemiarthroplasty 11/20/24  Co-Morbidities : HTN, osteopenia, CAD, CHF, AAA, PAD, COPD, PE, lung CA  Reason for Therapy: Mobility Dysfunction and Discharge Planning    PHYSICAL THERAPY ASSESSMENT   Patient is a 78 year old female admitted 11/19/2024 for fall with subsequent displaced R femoral neck fracture. Pt s/p R hip hemiarthroplasty 11/20/24.  Prior to admission, patient's baseline is independent.  Patient is currently functioning  near baseline with bed mobility, transfers, and gait.  Patient is requiring contact guard assist as a result of the following impairments: pain, impaired standing balance, decreased muscular endurance, difficulty maintaining precautions, medical status, and limited R hip ROM.  Physical Therapy will continue to follow for duration of hospitalization.    Patient will benefit from continued skilled PT Services at discharge to promote prior level of function and safety with additional support and return home with home health PT.    PLAN DURING HOSPITALIZATION  Nursing Mobility Recommendation : 1 Assist  PT Device Recommendation: Rolling walker  PT Treatment Plan: Bed mobility;Endurance;Energy conservation;Patient education;Family education;Gait training;Strengthening;Balance training;Stoop training  Rehab Potential : Good  Frequency (Obs): Daily     CURRENT GOALS  Goal #1  Patient is able to demonstrate supine - sit EOB @ level: supervision     Goal #2  Patient is able to demonstrate transfers Sit to/from Stand at assistance level: supervision   Goal #3    Patient is able to ambulate 150 feet with assistive device at assistance level: supervision   Goal #4    Patient will negotiate one curb w/ assistive device and supervision   Goal #5    Patient verbalizes and/or  demonstrates all precautions and safety concerns independently    Goal #6      Goal Comments: Goals established on 2024      HOME SITUATION  Type of Home: House   Home Layout: One level  Stairs to Enter : 1           Lives With: Alone  Drives: Yes       Prior Level of [place]: Pt lives alone in single story home. Pt independent and active. Pt ambulates with rollator in the community primarily to hold 02 tank. Pt is able to stay with one of her daughters at discharge. Pt would stay on the main level of the home.     SUBJECTIVE  \"I feel dizzy.\" Upon first standing    OBJECTIVE  Precautions: MYAH - posterior;Hip abduction pillow  Fall Risk: High fall risk    WEIGHT BEARING RESTRICTION  R Lower Extremity: Weight Bearing as Tolerated    PAIN ASSESSMENT  Ratin  Location: R LE  Management Techniques: Activity promotion;Repositioning    COGNITION  Overall Cognitive Status:  WFL - within functional limits    RANGE OF MOTION AND STRENGTH ASSESSMENT  Upper extremity ROM and strength are within functional limits     Lower extremity ROM is within functional limits     Lower extremity strength is within functional limits       BALANCE  Static Sitting: Good  Dynamic Sitting: Good  Static Standing: Fair -  Dynamic Standing: Poor +    ADDITIONAL TESTS                                    ACTIVITY TOLERANCE           BP: 104/39        Patient Position: Sitting    O2 WALK       NEUROLOGICAL FINDINGS                        AM-PAC '6-Clicks' INPATIENT SHORT FORM - BASIC MOBILITY  How much difficulty does the patient currently have...  Patient Difficulty: Turning over in bed (including adjusting bedclothes, sheets and blankets)?: A Little   Patient Difficulty: Sitting down on and standing up from a chair with arms (e.g., wheelchair, bedside commode, etc.): A Little   Patient Difficulty: Moving from lying on back to sitting on the side of the bed?: A Little   How much help from another person does the patient currently need...    Help from Another: Moving to and from a bed to a chair (including a wheelchair)?: A Little   Help from Another: Need to walk in hospital room?: A Little   Help from Another: Climbing 3-5 steps with a railing?: A Little       AM-PAC Score:  Raw Score: 18   Approx Degree of Impairment: 46.58%   Standardized Score (AM-PAC Scale): 43.63   CMS Modifier (G-Code): CK    FUNCTIONAL ABILITY STATUS  Gait Assessment   Functional Mobility/Gait Assessment  Gait Assistance: Contact guard assist  Distance (ft): 60  Assistive Device: Rolling walker    Skilled Therapy Provided  VC for transfer set up while maintaining precautions with RW use.   Sit-stand with RW and CGA.   Initially very dizzy upon standing - last ~ 2 min.   Returned to sitting.   Unable to obtain accurate BP reading.  Sit-stand to RW with CGA 2nd rep.   Reports improvement in dizziness.   Ambulatory with RW and CGA.   VC for sequencing with RW.   Ambulates with slow joe and step to pattern.   Returned to room up in bedside chair.     Bed Mobility:  Rolling: NT  Supine to sit: NT   Sit to supine: NT     Transfer Mobility:  Sit to stand: CGA   Stand to sit: CGA  Gait = CGA    Therapist's Comments:  Reviewed posterior hip precautions and activity recommendations.     Exercise/Education Provided:  Bed mobility  Energy conservation  Functional activity tolerated  Gait training  Posture  Strengthening  Transfer training    Patient End of Session: Up in chair;With  staff;Needs met;Call light within reach;RN aware of session/findings;All patient questions and concerns addressed;Hospital anti-slip socks;Family present;Discussed recommendations with /    Patient Evaluation Complexity Level:  History Moderate - 1 or 2 personal factors and/or co-morbidities   Examination of body systems Moderate - addressing a total of 3 or more elements   Clinical Presentation Low- Stable   Clinical Decision Making Low Complexity       PT Session Time: 30  minutes  Gait Training: 10 minutes  Therapeutic Activity:  minutes  Neuromuscular Re-education:  minutes  Therapeutic Exercise:  minutes

## 2024-11-21 NOTE — PROGRESS NOTES
[facility] Hospitalist Progress Note                                                                     [facility]   part of [facility]  12/21/1945    SUBJECTIVE: Patient denies any chest pain, palpitations, shortness of breath, cough, nausea, vomiting, abdominal pain.     OBJECTIVE:  Temp:  [97.1 °F (36.2 °C)-98.4 °F (36.9 °C)] 97.1 °F (36.2 °C)  Pulse:  [51-92] 63  Resp:  [13-21] 16  BP: ()/() 104/39  SpO2:  [93 %-100 %] 96 %  Exam  Gen: No acute distress, alert and oriented  Pulm: Lungs clear bilaterally, normal respiratory effort, no crackles, no wheezing  CV: Heart with regular rate and rhythm, no murmur.  Abd: Abdomen soft, nontender, nondistended, bowel sounds present  MSK: No significant pitting edema or tenderness of the LE  Skin: no new rashes or lesions    Labs:   Recent Labs   Lab 11/19/24 1748 11/20/24 0356 11/21/24  0446   WBC 25.4* 19.8* 25.0*   HGB 12.9 11.0* 10.4*   MCV 95.1 93.6 96.0   .0 293.0 297.0   INR 1.03  --   --        Recent Labs   Lab 11/19/24 1748 11/20/24 0356 11/21/24  0446   * 133* 134*   K 4.8 4.4 5.0    104 104   CO2 24.0 25.0 23.0   BUN 18 18 20   CREATSERUM 1.10* 0.94 1.35*   CA 9.5 8.9 8.7   MG  --  1.8 1.8   * 110* 143*       Recent Labs   Lab 11/19/24 1748   ALT 39   AST 26   ALB 4.6       No results for input(s): \"PGLU\" in the last 168 hours.    Meds:   Scheduled:    apixaban  5 mg Oral BID    aspirin  81 mg Oral Daily    sennosides  17.2 mg Oral Nightly    docusate sodium  100 mg Oral BID    multivitamin  1 tablet Oral Daily    metoprolol succinate ER  50 mg Oral Daily    rosuvastatin  20 mg Oral Nightly    lisinopril  20 mg Oral Daily    predniSONE  40 mg Oral Daily    Followed by    [START ON 11/23/2024] predniSONE  20 mg Oral Daily with breakfast    Followed by    [START ON 11/30/2024] predniSONE  10 mg Oral Daily with breakfast     Continuous Infusions:    PRN:   benzocaine-menthol    polyethylene glycol (PEG 3350)    magnesium hydroxide    bisacodyl    fleet enema    ondansetron    metoclopramide    acetaminophen    acetaminophen **OR** HYDROcodone-acetaminophen **OR** HYDROcodone-acetaminophen    morphINE **OR** morphINE **OR** morphINE    melatonin    lidocaine PF    ropivacaine    EPINEPHrine    sodium chloride 0.9% PF    hydrALAzine    pantoprazole    ASSESSMENT / PLAN:   78 year old female with history of AAA, ??COPD, Diastolic dysfunction, gerd, PAD, HTN, HLD, hx PFO, lung cancer on immunotherapy presenting with a mechanical fall and right leg pain.      Right femoral neck fracture  -ortho consulted--> OR --> POD # 1 s/p right hip hemiarthroplasty/bipolar  -prn iv morphine   -prn po norco  -dc pain meds per ortho     HTN  -sbp elevated--> imporved  -continue metoprolol  -continue lisinopril   -iv hydralazine prn for sbp< 160  -cardiology following     HLD  -rosuvastatin     PAD  -asa on hold  -statin     Hx PE  -eliquis on hold due to fx and surgery in near future--> resumed today      GERD  -pantoprazole     Recent immunotherapy induced kidney injury  -continue prednisone if ok with ortho     Leukocytosis  -no obvious infection  -likely leukemoid reaction to fracture and prednisone use.      PRINCESS  -repeat bmp at 3 pm  -if worse consult renal for evaluation and tx    Quality:  DVT Prophylaxis: scd, eliquis  CODE status: Full  Matos: jostin     Plan of care discussed with patient, daughter and staff     Dispo: no discharge.      John Womack MD  [facility]  [phone]

## 2024-11-21 NOTE — ANESTHESIA POSTPROCEDURE EVALUATION
[facility]    Mojgan PRASAD Ronny Patient Status:  Inpatient   Age/Gender 78 year old female MRN [id number]   Location [facility] [place] SURGERY Attending John Womack MD   Hosp Day # 1 PCP Guerrero Naik MD       Anesthesia Post-op Note    RIGHT HIP HEMIARTHROPLASTY/ BIPOLAR    Procedure Summary       Date: 11/20/24 Room / Location:  MAIN OR  /  MAIN OR    Anesthesia Start: 1716 Anesthesia Stop: 1934    Procedure: RIGHT HIP HEMIARTHROPLASTY/ BIPOLAR (Right: Hip) Diagnosis:       Displaced fracture of right femoral neck (HCC)      (Displaced fracture of right femoral neck (HCC) [S72.001A])    Surgeons: Andrew Anand MD Anesthesiologist: Doug Ohara MD    Anesthesia Type: general ASA Status: 3            Anesthesia Type: general    Vitals Value Taken Time   /82 11/20/24 1936   Temp 98.4 11/20/24 1936   Pulse 87 11/20/24 1936   Resp 15 11/20/24 1936   SpO2 100 11/20/24 1936       Patient Location: PACU    Anesthesia Type: general    Airway Patency: patent    Postop Pain Control: adequate    Mental Status: preanesthetic baseline    Nausea/Vomiting: none    Cardiopulmonary/Hydration status: stable euvolemic    Complications: no apparent anesthesia related complications    Postop vital signs: stable    Dental Exam: Unchanged from Preop    Patient to be discharged from PACU when criteria met.

## 2024-11-21 NOTE — PROGRESS NOTES
[facility]  Progress Note    Mojgan Jefferson Patient Status:  Inpatient    1945 MRN [id number]   Location [facility] 3SW-A Attending John Womack MD   Hosp Day # 2 PCP Guerrero Naik MD     Subjective:   Up in chair.  No chest pain.  No shortness of breath.  No new focal cardiovascular complaints reported.    Objective:  Vitals:    24 0830 24 0845 24 1200 24 1501   BP: (!) 138/113 104/39 (!) 89/37 109/56   BP Location: Right arm  Left arm Left arm   Pulse: 63  56 61   Resp: 16   23   Temp: 97.1 °F (36.2 °C)  97.6 °F (36.4 °C) 97.5 °F (36.4 °C)   TempSrc: Axillary  Oral Oral   SpO2: 96%  97%    Weight:       Height:           Temp (24hrs), Av.6 °F (36.4 °C), Min:97.1 °F (36.2 °C), Max:98.4 °F (36.9 °C)      Medications:   Scheduled:    apixaban  5 mg Oral BID    aspirin  81 mg Oral Daily    sennosides  17.2 mg Oral Nightly    docusate sodium  100 mg Oral BID    multivitamin  1 tablet Oral Daily    metoprolol succinate ER  50 mg Oral Daily    rosuvastatin  20 mg Oral Nightly    lisinopril  20 mg Oral Daily    predniSONE  40 mg Oral Daily    Followed by    [START ON 2024] predniSONE  20 mg Oral Daily with breakfast    Followed by    [START ON 2024] predniSONE  10 mg Oral Daily with breakfast       Continuous Infusion:       PRN Medications:     benzocaine-menthol    polyethylene glycol (PEG 3350)    magnesium hydroxide    bisacodyl    fleet enema    ondansetron    metoclopramide    acetaminophen    acetaminophen **OR** HYDROcodone-acetaminophen **OR** HYDROcodone-acetaminophen    morphINE **OR** morphINE **OR** morphINE    melatonin    lidocaine PF    ropivacaine    EPINEPHrine    sodium chloride 0.9% PF    hydrALAzine    pantoprazole    Intake/Output:     Intake/Output Summary (Last 24 hours) at 2024 1513  Last data filed at 2024 1501  Gross per 24 hour   Intake 1450 ml   Output 1000 ml   Net 450 ml       Wt Readings from Last 3 Encounters:    11/20/24 122 lb (55.3 kg)   10/08/24 118 lb 9.6 oz (53.8 kg)   04/09/24 137 lb (62.1 kg)       Allergies:  Allergies[1]    Physical Exam:   General:  Well-developed / Well-nourished.  No acute distress.  HEENT:  Normocephalic.  Atraumatic.  No icterus.  Neck:  There is no jugular venous distention.   Cardiovascular:  Cardiovascular examination demonstrates a regular rate and rhythm.  There is normal S1, S2.  There is no S3 or S4.  There are no murmurs, rubs, or gallops.  No click is appreciated.  PMI is nondisplaced with a normal apical impulse.    Pulmonary:  Lungs are clear to auscultation bilaterally.  There are no focal rales, rhonchi, or wheezes.  Good air movement is noted throughout both lung fields.   Abdomen:  The abdomen is soft, non-distended, and non-tender.  Bowel sounds are present and normoactive.  No organomegaly is appreciated.  Extremities:  Extremities do not demonstrate any evidence of peripheral edema.   No cyanosis or clubbing of the digits is appreciated.  Neurologic:  Alert and oriented.  Normal affect.  Integument:  No visible rashes are appreciated.      Laboratory/Data:   Recent Labs   Lab 11/19/24 1748 11/20/24 0356 11/21/24  0446   * 110* 143*   BUN 18 18 20   CREATSERUM 1.10* 0.94 1.35*   CA 9.5 8.9 8.7   ALB 4.6  --   --    * 133* 134*   K 4.8 4.4 5.0    104 104   CO2 24.0 25.0 23.0   ALKPHO 97  --   --    AST 26  --   --    ALT 39  --   --    BILT 0.7  --   --    TP 6.8  --   --        Recent Labs   Lab 11/19/24 1748 11/20/24 0356 11/21/24  0446   RBC 4.05 3.44* 3.27*   HGB 12.9 11.0* 10.4*   HCT 38.5 32.2* 31.4*   MCV 95.1 93.6 96.0   MCH 31.9 32.0 31.8   MCHC 33.5 34.2 33.1   RDW 16.5 16.1 16.2   NEPRELIM 23.67* 16.66* 22.35*   WBC 25.4* 19.8* 25.0*   .0 293.0 297.0       Recent Labs   Lab 11/19/24  1748   PTP 13.6   INR 1.03       No results for input(s): \"TROP\", \"CK\" in the last 168 hours.      Tele:     Diagnostics:    Echo:   Echo 2/24:    The  left ventricle is normal in size.   LV systolic performance is normal.   No left ventricular regional wall motion abnormalities were noted.   There is normal left ventricular wall thickness.   There is trace pulmonic valvular regurgitation.   There is mild tricuspid regurgitation.   RV is poorly visualized (probably normal).   LA volume is within normal range for body size.   The right atrium is mildly dilated.     Assessment:     HTN  -Escalation of BP on presentation reflective of anxiety and pain  -Improved and in fact low on present Rx  2.     HL  3.     COPD                -O2 with activity  4.     PVD                -S/P R iliac stenting  5.    AAA Hx                -No dilatation reported on CT 10/24  6.    H/O PFO  7.    Metastatic lung CA  8.    Simple fall with R femoral neck Fx   -S/P R hip hemiarthroplasty  9.    H/O DVT                -Chronic Eliquis    Plan:    BB   Stop Lisinopril   Tele   Monitor hemodynamics   F/U CBC   Eliquis resumed - dose to 2.5mg PO BID   Pain controlled    Tristian Rey MD  11/21/2024  3:13 PM         [1] No Known Allergies

## 2024-11-21 NOTE — DISCHARGE INSTRUCTIONS
Continue prednisone taper as outlined by her nephrologist at [facility]  Avoid NSAIDs  Follow up with ortho, nephrologist, cardiologist, PCP, and oncologist.     Sometimes managing your health at home requires assistance.  The [facility] team has recognized your preference to use [facility].  They can be reached by phone at [phone].  The fax number for your reference is [phone].  A representative from the home health agency will contact you or your family to schedule your first visit.      Hip Fracture Discharge Instructions    Activity    Weight bearing restrictions:  WBAT  Non-weight bearing (NWB) ? you may NOT put any weight at all on your leg. You will need to use a walker, crutches, or          wheelchair to get around.  Toe-touch weight bearing (TTWB) or touch-down weight bearing(TDWB) ? you may only put your toes down lightly. This is for balance only. This often means you may not put more than 10 percent of your body weight on your leg. You will need to use a walker, crutches, or wheelchair to get around.  Partial weight bearing (PWB) ? you may only put about half of your weight on your leg. You will need a walker or crutches to get around.  Weight bearing as tolerated (WBAT) ? you may put as much of your weight on your leg as you can tolerate. This means you may use a walker, crutches, cane- or no device at all to get around.  Full weight bearing (FWB) ? you may put full weight on your leg. You may not need any device at all to help with walking.     Bathing  No tub baths, pools, or saunas until cleared by surgeon (about 4-6 weeks because it takes that long for the incision(s) on the skin to heal and be a barrier to prevent infection.)    When allowed to shower:  AQUACEL dressing is waterproof and does not require being covered before showering.  Pat dressing(s) and surrounding skin dry after shower.   There may be one incision or a few that have a dressing.                                       AQUACEL           MEDIPORE/COVERLET           DRY STERILE GAUZE                                                                                                                         MEDIPORE/COVERLET dressing and dry sterile gauze are NOT waterproof and REQUIRE being covered with a waterproof barrier to keep the dressing and incision dry.  SARAN WRAP, GLAD WRAP, PRESS N SEAL WORK REALLY WELL BUT ANY PLASTIC WRAP WILL DO.  Do not wash incision.   Remove entire wrapping and old dressing (if Medipore/coverlet or gauze) after showering. Pat dry with a CLEAN TOWEL if necessary and cover incision with new Medipore/coverlet or gauze.    Incision care/Dressing changes  Wash hands before and after dressing changes.  There may be one or a few dressings on the hip/leg    FOR MEDIPORE/COVERLET DRESSINGS:  Change dressing daily using Medipore/coverlet once Aquacel (waterproof) dressing is removed (which is about 7 days after surgery). Patient should be standing or lying flat so dressing goes on smoothly.  (This dressing needed for hip patients because of location of incision-don’t want contamination from bathroom use)  FOR DRY STERILE GAUZE DRESSINGS:   Change dressing daily using dry sterile gauze and paper tape.  Watch ALL incision for any redness, drainage, increased warmth or opening of the incision.   Call surgeon if you notice any of these.  No lotions or ointments on or near incision(s).                             DRY STERILE GAUZE                         MEDIPORE /COVERLET    Continue dressing changes until your first visit with your surgeon’s office.  There could be a small amount of redness around the staples or incision; this is normal.  Watch for increased redness, warmth, any odor, increased drainage or opening of the incision. A little clear yellow or blood tinged drainage is normal up to 2 weeks after surgery but it should be less every day until it stops.  Call physician if you notice any  concerning changes.  Sutures/staples will be removed at first office visit (10 days- 3 weeks).       Driving  Do not drive until cleared by surgeon. Possibly six weeks after surgery. Discuss this at follow-up office visit.   Not allowed while taking narcotic pain medication or muscle relaxants.    Sex  Usually allowed after six weeks - check with surgeon at your office visit.    Return to work  Usually allowed after six weeks. Discuss specific work activities with your surgeon.    Restrictions  Follow instructions provided by physical therapy    No smoking  Avoid smoking. It is known to cause breathing problems and can decrease the rate of healing.                      Medication  Anticoagulants = blood thinners (Xarelto, Eliquis, Lovenox, Coumadin or Aspirin)  Pill or shot form depending on what your physician orders.   IF placed on Coumadin, you may also need lab work done for monitoring.  You will bleed easier and bruise easier while on these medications.   Usually you will be on a blood thinner for about 4-5 weeks after surgery.  Contact your physician if you have signs of bruising, nose bleeds or blood in your urine. Use electric razors and soft toothbrushes only.  Do not take NSAIDS (non-steroidal anti-inflammatory medications) while taking blood thinners unless ordered by your surgeon.  Review anticoagulant education information sheet provided.    Discomfort  Surgical discomfort is normal for one to two months.  Have realistic goals and keep a positive outlook.  Keep pain manageable; pain should not disrupt your sleep or activities like getting out of bed or walking.  You may need pain medication regularly (every 4-6 hours) the first 2 weeks and then begin to decrease how often you are taking it.  Take pain medication as prescribed with food, especially before therapy, allowing 30-60 minutes to take effect.  Do not drink alcohol while on pain medication.  As you have less discomfort, decrease the amount of  pain medication you take. Use plain Tylenol (acetaminophen) for less severe pain.  Some pain medications have Tylenol (acetaminophen) in them such as Norco and Percocet. Do NOT take Tylenol (acetaminophen) within 4 hours of a dose of these medications.  Apply ice or cold therapy to surgical site for 20 minutes at least four times a day, especially after therapy. Be sure there is a thin cloth barrier between skin and ice or cold therapy.  Change position at least every 45 minutes while awake to avoid stiffness or increased discomfort.  Deep breathing and relaxation techniques and distractions can help!  If you focus on something else, you do not experience the pain the same. Take advantage of everything available to your to help control you discomfort.  Contact physician if discomfort does not respond to pain medication.    Body changes  Constipation is common with the use of narcotics.  Eat fiber rich foods and drink plenty of fluids, at least 4-6 glasses of water a day, unless restricted.  To prevent constipation, use stool softeners such as Colace and laxatives such as Miralax, Senakot or Milk of Magnesia while taking laxatives.   An enema or suppository may be needed if above measures do not work.    Prevention of infection and promotion of healing  Good hand washing is important. Everyone should wash their hands or use hand  as soon as they walk in your house-whether they live there or are visiting.  Keep bed linen/clothing freshly laundered.  Do not allow others or pets to touch your incision.  Avoid people that have colds or the flu.  Your surgeon may recommend that you take antibiotics before you undergo any dental or other invasive surgical procedures after your hip fracture surgery. Speak with your surgeon about this at your post-op office visit.  Continue using incentive spirometry because narcotics make you sleepy so you may not take good deep breaths. We do not want you to get pneumonia.      Post op Office visits  Schedule 10 days to 3 weeks after surgery WITH SURGEON’s office.  Additional visits may need to be scheduled. Your physician will discuss this at first post-op office visit.  Schedule outpatient physical therapy per your surgeon’s orders.  Schedule one week follow up after surgery WITH PRIMARY CARE PHYSICIAN; review your medications over last 6 months.  Your body gets stressed by surgery and that stress can affect all your other health issues (such as high blood pressure, diabetes, CHF, afib, and asthma just to name a few).  It is much better to catch developing problems and prevent them from becoming larger ones.  OZZY HOSE - IF ordered by your surgeon, wear these during the day and off at night.  Surgeon will tell you when you don’t need them anymore.    Notify your surgeon if you notice any of the following signs  Separation of incision line.  Increased redness, swelling, or warmth of skin around incision.  Increased or foul smelling drainage from incision  Red streaks on skin near incision.  Temperature >100.4F.  Increased pain at incision not relieved by pain medication.    Signs of Possible Hip Dislocation IF Total Hip Replacement or Baldo-arthroplasty Done  Increased severe leg or groin pain  Turning in or out of surgical leg that is new  Increased numbness, tingling to leg  Inability to walk or put weight on your surgical leg  Signs of blood clot  Pain, excessive tenderness, redness, or swelling in leg or calf (other than incision site).  Call physician and await their directions.    Go directly to the ER or CALL 911 if you:  become short of breath  have chest pain  cough up blood  have unexplained anxiety with breathing     Traveling and Handicapped parking  Check with you surgeon when you are allowed to travel so you don’t set yourself up for greater chance of complications.  If traveling by car, get out to stretch every 2 hours.  This helps prevent stiffness.  You may need to do  this any time you travel for the first year after surgery.  If traveling by plane, BEFORE you get into a security line, let them know that you had your hip replaced, as you will most likely set off the metal detector. The doctors no longer provide an identification card for this as they are easily copied. ALSO request a wheelchair the first year to board and get off a plane…this aids in priority seating and you should sit on the aisle or at the bulkhead where you can easily stretch your legs and get up to walk up and down the aisles…this helps prevent blood clots and stiffness.  TEMPORARY HANDICAP PARKING APPLICATION  (good for 3-6 months)  - At Surgeon or PCP visit, request they fill out their part of the form. You fill our your portion, then go to Department of Motor Vehicles. Some [facility] offices provide the same service. ([place] and [place] have this service; if you live in another [facility], you may check with them as well). You need space to open car doors to position yourself properly with walker to get in and out of your car safely; some parking spaces are  practically on top of each other and do not give you enough room.

## 2024-11-21 NOTE — OCCUPATIONAL THERAPY NOTE
OCCUPATIONAL THERAPY EVALUATION - INPATIENT     Room Number: 365/365-A  Evaluation Date: 11/21/2024  Type of Evaluation: Initial  Presenting Problem: mechanical fall with R hip fx, s/p R hip hemiarthroplasty 11/20/24    Physician Order: IP Consult to Occupational Therapy  Reason for Therapy: ADL/IADL Dysfunction and Discharge Planning    OCCUPATIONAL THERAPY ASSESSMENT   Patient is currently functioning near baseline with toileting, lower body dressing, bed mobility, transfers, and dynamic standing balance. Prior to admission, patient's baseline is independent.  Patient is requiring CGA for standing and functional mobility, mod assist for standing/LB ADLs as a result of the following impairments: decreased functional strength, decreased functional reach, decreased endurance, pain, impaired standing balance, and difficulty maintaining precautions. Occupational Therapy will continue to follow for duration of hospitalization.    Patient will benefit from continued skilled OT Services at discharge to promote prior level of function and safety with additional support and return home with home health OT    History Related to Current Admission: Patient is a 78 year old female admitted on 11/19/2024 with Presenting Problem: mechanical fall with R hip fx, s/p R hip hemiarthroplasty 11/20/24. Co-Morbidities : HTN, osteopenia, CAD, CHF, AAA, PAD, COPD, PE, lung CA    WEIGHT BEARING RESTRICTION  R Lower Extremity: Weight Bearing as Tolerated      Recommendations for nursing staff:   Transfers: 1-person  Toileting location: bathroom    EVALUATION SESSION:  Patient Start of Session: chair    FUNCTIONAL TRANSFER ASSESSMENT  Sit to Stand: Chair  Chair: Contact Guard Assist    BED MOBILITY     BALANCE ASSESSMENT     FUNCTIONAL ADL ASSESSMENT     ACTIVITY TOLERANCE:   152/113 sitting, start of session on RUE  104/39 sitting, end of session on LUE  Intermittent dizziness, RN aware.                         O2 SATURATIONS        COGNITION  Overall Cognitive Status:  WFL - within functional limits    Upper Extremity   ROM: within functional limits   Strength: within functional limits     EDUCATION PROVIDED  Patient Education : Role of Occupational Therapy; Functional Transfer Techniques; Fall Prevention; Weight Bear Status; Surgical Precautions; Posture/Positioning; Proper Body Mechanics  Patient's Response to Education: Verbalized Understanding; Requires Further Education (daughter present)    Equipment used: RW  Demonstrates functional use, Would benefit from additional trial      Therapist comments: Patient received sitting up in chair, supportive daughter at bedside throughout session; educated on posterior hip precautions and incorporation into ADLs, will benefit from ongoing reinforcement. Patient grossly CGA for standing from chair and functional mobility to/from simpson with RW and increased time; patient declining toileting or other ADLs at this time, however initiated education on adaptive techniques for LB dressing (already has hip kit) and discussed recommendation of safety frame as patient reports nothing adjacent to her toilet at home for steadying. Will continue to follow. RN aware to notify therapy to come for additional session this pm if patient may discharge today.     Patient End of Session: Up in chair;Needs met;Call light within reach;RN aware of session/findings;All patient questions and concerns addressed;Hospital anti-slip socks;SCDs in place;Alarm set;Family present    OCCUPATIONAL PROFILE    HOME SITUATION  Type of Home: House  Home Layout: One level  Lives With: Alone    Toilet and Equipment: Comfort height toilet  Shower/Tub and Equipment: Walk-in shower;Shower chair  Other Equipment: Hip kit    Occupation/Status: retired     Drives: Yes       Prior Level of Function: Patient reports independent in all I/ADL and functional mobility via rollator prior to admission.    SUBJECTIVE   \"The pain isn't bad, but I  haven't moved yet!\"    PAIN ASSESSMENT  Ratin  Location: denies at rest, slight increase R hip with movement  Management Techniques: Activity promotion;Repositioning;Body mechanics    OBJECTIVE  Precautions: MYAH - posterior;Hip abduction pillow  Fall Risk: High fall risk    ASSESSMENTS    AM-PAC ‘6-Clicks’ Inpatient Daily Activity Short Form  -   Putting on and taking off regular lower body clothing?: A Lot  -   Bathing (including washing, rinsing, drying)?: A Little  -   Toileting, which includes using toilet, bedpan or urinal? : A Little  -   Putting on and taking off regular upper body clothing?: None  -   Taking care of personal grooming such as brushing teeth?: None  -   Eating meals?: None    AM-PAC Score:  Score: 20  Approx Degree of Impairment: 38.32%  Standardized Score (AM-PAC Scale): 42.03    ADDITIONAL TESTS     NEUROLOGICAL FINDINGS      COGNITION ASSESSMENTS     PLAN  OT Device Recommendations: TBD (safety frame)  OT Treatment Plan: Balance activities;ADL training;Functional transfer training;Endurance training;Patient/Family education;Patient/Family training;Compensatory technique education  Rehab Potential : Good  Frequency: 3x/week  Number of Visits to Meet Established Goals: 2    ADL GOALS   Patient will perform Lower Body Dressing with supervision  Patient will perform Toileting with supervision    FUNCTIONAL TRANSFER GOALS   Patient will transfer Supine to Sit with supervision  Patient will transfer Sit to Supine with supervision  Patient will transfer to Toilet with supervision    ADDITIONAL GOALS   Patient will recall all hip precautions and incorporate into ADL tasks    Patient Evaluation Complexity Level:   Occupational Profile/Medical History LOW - Brief history including review of medical or therapy records    Specific performance deficits impacting engagement in ADL/IADL LOW  1 - 3 performance deficits    Client Assessment/Performance Deficits LOW - No comorbidities nor modifications  of tasks    Clinical Decision Making LOW - Analysis of occupational profile, problem-focused assessments, limited treatment options    Overall Complexity LOW     OT Session Time: 30 minutes  Therapeutic Activity: 10 minutes

## 2024-11-22 LAB
ANION GAP SERPL CALC-SCNC: 5 MMOL/L (ref 0–18)
BASOPHILS # BLD AUTO: 0.01 X10(3) UL (ref 0–0.2)
BASOPHILS NFR BLD AUTO: 0.1 %
BUN BLD-MCNC: 34 MG/DL (ref 9–23)
CALCIUM BLD-MCNC: 8.9 MG/DL (ref 8.7–10.4)
CHLORIDE SERPL-SCNC: 102 MMOL/L (ref 98–112)
CO2 SERPL-SCNC: 23 MMOL/L (ref 21–32)
CREAT BLD-MCNC: 1.54 MG/DL
CREAT UR-SCNC: 40 MG/DL
EGFRCR SERPLBLD CKD-EPI 2021: 34 ML/MIN/1.73M2 (ref 60–?)
EOSINOPHIL # BLD AUTO: 0.11 X10(3) UL (ref 0–0.7)
EOSINOPHIL NFR BLD AUTO: 0.8 %
ERYTHROCYTE [DISTWIDTH] IN BLOOD BY AUTOMATED COUNT: 16 %
GLUCOSE BLD-MCNC: 101 MG/DL (ref 70–99)
HCT VFR BLD AUTO: 25.3 %
HGB BLD-MCNC: 8.4 G/DL
IMM GRANULOCYTES # BLD AUTO: 0.14 X10(3) UL (ref 0–1)
IMM GRANULOCYTES NFR BLD: 1 %
LYMPHOCYTES # BLD AUTO: 1.23 X10(3) UL (ref 1–4)
LYMPHOCYTES NFR BLD AUTO: 8.4 %
MAGNESIUM SERPL-MCNC: 2.2 MG/DL (ref 1.6–2.6)
MCH RBC QN AUTO: 32.2 PG (ref 26–34)
MCHC RBC AUTO-ENTMCNC: 33.2 G/DL (ref 31–37)
MCV RBC AUTO: 96.9 FL
MONOCYTES # BLD AUTO: 0.8 X10(3) UL (ref 0.1–1)
MONOCYTES NFR BLD AUTO: 5.5 %
NEUTROPHILS # BLD AUTO: 12.36 X10 (3) UL (ref 1.5–7.7)
NEUTROPHILS # BLD AUTO: 12.36 X10(3) UL (ref 1.5–7.7)
NEUTROPHILS NFR BLD AUTO: 84.2 %
OSMOLALITY SERPL CALC.SUM OF ELEC: 278 MOSM/KG (ref 275–295)
OSMOLALITY UR: 377 MOSM/KG (ref 300–1300)
PLATELET # BLD AUTO: 221 10(3)UL (ref 150–450)
POTASSIUM SERPL-SCNC: 5.2 MMOL/L (ref 3.5–5.1)
POTASSIUM UR-SCNC: 35.1 MMOL/L
RBC # BLD AUTO: 2.61 X10(6)UL
SODIUM SERPL-SCNC: 130 MMOL/L (ref 136–145)
SODIUM SERPL-SCNC: 79 MMOL/L
WBC # BLD AUTO: 14.7 X10(3) UL (ref 4–11)

## 2024-11-22 PROCEDURE — 83935 ASSAY OF URINE OSMOLALITY: CPT | Performed by: STUDENT IN AN ORGANIZED HEALTH CARE EDUCATION/TRAINING PROGRAM

## 2024-11-22 PROCEDURE — 85025 COMPLETE CBC W/AUTO DIFF WBC: CPT | Performed by: HOSPITALIST

## 2024-11-22 PROCEDURE — 97530 THERAPEUTIC ACTIVITIES: CPT

## 2024-11-22 PROCEDURE — 84133 ASSAY OF URINE POTASSIUM: CPT | Performed by: STUDENT IN AN ORGANIZED HEALTH CARE EDUCATION/TRAINING PROGRAM

## 2024-11-22 PROCEDURE — 84300 ASSAY OF URINE SODIUM: CPT | Performed by: STUDENT IN AN ORGANIZED HEALTH CARE EDUCATION/TRAINING PROGRAM

## 2024-11-22 PROCEDURE — 80048 BASIC METABOLIC PNL TOTAL CA: CPT | Performed by: HOSPITALIST

## 2024-11-22 PROCEDURE — 83735 ASSAY OF MAGNESIUM: CPT | Performed by: HOSPITALIST

## 2024-11-22 PROCEDURE — 97535 SELF CARE MNGMENT TRAINING: CPT

## 2024-11-22 PROCEDURE — 82570 ASSAY OF URINE CREATININE: CPT | Performed by: STUDENT IN AN ORGANIZED HEALTH CARE EDUCATION/TRAINING PROGRAM

## 2024-11-22 RX ORDER — SODIUM CHLORIDE 9 MG/ML
INJECTION, SOLUTION INTRAVENOUS CONTINUOUS
Status: DISCONTINUED | OUTPATIENT
Start: 2024-11-22 | End: 2024-11-23

## 2024-11-22 RX ORDER — HYDROCODONE BITARTRATE AND ACETAMINOPHEN 5; 325 MG/1; MG/1
1 TABLET ORAL EVERY 6 HOURS PRN
Qty: 12 TABLET | Refills: 0 | Status: SHIPPED | OUTPATIENT
Start: 2024-11-22

## 2024-11-22 NOTE — CONGREGATE LIVING REVIEW
Congregate Living Authorization    The [facility] Living Review Committee (CLRC) has reviewed this case and the committee DOES NOT RECOMMEND discharge to a skilled nursing facility under skilled care.    The CLRC recommends:  Home with home health and any other appropriate caregiver assistance or medical equipment as needed vs respite in SNF.     Does not appear to have skilled services for ADELAIDA, but additional home support appears to be needed.    For questions regarding CLRC approval process, please contact the CM assigned to the case.  For questions regarding RN discharge workflow, please contact the unit Clinical Leader.

## 2024-11-22 NOTE — CM/SW NOTE
Received voicemail message from pt's dtr, Deena expressing concern that pt needs to go to [facility] or [facility] for rehab.    Met with pt and her dtr at bedside.  Informed pt of message received and pt stated that she does not feel that she needs to go to rehab facility.  Based on therapy assessments yesterday, inpatient rehab is not recommended or appropriate.  As pt lives alone, she would benefit from support from her family, particularly in the first few days after DC but otherwise appears appropriate for DC to home with [facility].  Pt in agreement, but her dtr concerned that she is not able to lift pt or provide 24 hour assistance.  Based on therapy notes, pt will not need this at DC.  OT will see pt today and if there are significant changes in pt's physical mobility the DC plan can be revisited.  Pt frustrated with her family and requested caregiver list so that she does not need to rely on her family.  Private hire caregiver list given.    / to remain available for support and/or discharge planning.     Monik Cardenas, [facility]  Discharge Planner  [phone]

## 2024-11-22 NOTE — PROGRESS NOTES
[facility] Orthopedics  Progress Note    Mojgan Jefferson Patient Status:  Inpatient    1945 MRN [id number]   [facility] 3SW-A Attending John Womack MD   Hosp Day # 3 PCP Guerrero Naik MD       Subjective:  POD #2 from right hip hemiarthroplasty/bipolar for a displaced fracture of the right femoral neck with Dr. Anand on 2024.     No overnight events. Patient is resting comfortably in bed. Daughter at the bedside. Patient is not complaining of any pain to the right hip. Does describe that her operative leg feels asleep. Does not complain of any numbness.  She states she does have some tingling in the hip area when she is sedentary for long peers of time.  She states that this does improve once she is ambulatory. Denies fever, chills, chest pain, shortness of breath, calf pain        Objective:  Vital signs in last 24 hours:  Patient Vitals for the past 24 hrs:   BP Temp Temp src Pulse Resp SpO2   24 1108 113/45 97.7 °F (36.5 °C) Oral 65 16 100 %   24 0716 129/45 98.1 °F (36.7 °C) Oral 64 16 94 %   24 0451 126/52 98.8 °F (37.1 °C) Oral 60 18 --   240 -- -- -- 63 -- --   24 2330 109/45 98.6 °F (37 °C) Oral 63 18 --   24 -- -- -- 74 20 --   24 110/49 98 °F (36.7 °C) Oral 75 16 98 %   24 1501 109/56 97.5 °F (36.4 °C) Oral 61 23 --   24 1200 (!) 89/37 97.6 °F (36.4 °C) Oral 56 20 97 %         General: A&Ox3, NAD  Neuro: No focal deficits.  EHL/DF/PF intact.  Sensation intact to light touch throughout the lower extremity.  MSK:   On examination of the right lower extremity leg lengths are equal without obvious deformity.  Aquacel dressing present to the lateral hip is clean dry and intact.  Less than 5% saturation.  There is no surrounding erythema.  Thigh compartments are soft compressible without tenderness.  Calf compartment is soft compressible without tenderness.  Patient able to maintain a straight leg  raise against gravity.  No pain with logroll.  The extremity is neurovascularly intact with 2+ distal pulses.          Data Review  CBC:   Recent Labs   Lab 11/20/24  0356 11/21/24  0446 11/22/24  0508   RBC 3.44* 3.27* 2.61*   HGB 11.0* 10.4* 8.4*   HCT 32.2* 31.4* 25.3*   MCV 93.6 96.0 96.9   MCH 32.0 31.8 32.2   MCHC 34.2 33.1 33.2   RDW 16.1 16.2 16.0   NEPRELIM 16.66* 22.35* 12.36*   WBC 19.8* 25.0* 14.7*   .0 297.0 221.0       Recent Labs   Lab 11/19/24  1748 11/20/24  0356 11/21/24  0446 11/21/24  1504 11/22/24  0508   *   < > 143* 207* 101*   BUN 18   < > 20 25* 34*   CREATSERUM 1.10*   < > 1.35* 2.00* 1.54*   EGFRCR 51*   < > 40* 25* 34*   CA 9.5   < > 8.7 8.5* 8.9   ALB 4.6  --   --   --   --    *   < > 134* 130* 130*   K 4.8   < > 5.0 5.3* 5.2*      < > 104 101 102   CO2 24.0   < > 23.0 21.0 23.0   ALKPHO 97  --   --   --   --    AST 26  --   --   --   --    ALT 39  --   --   --   --    BILT 0.7  --   --   --   --    TP 6.8  --   --   --   --     < > = values in this interval not displayed.         Microbiology data:  Hospital Encounter on 11/19/24   1. Blood Culture     Status: None (Preliminary result)    Collection Time: 11/19/24  7:01 PM    Specimen: Blood,peripheral   Result Value Ref Range    Blood Culture Result No Growth 2 Days N/A        Assessment/Plan:    This is an 78-year-old female who sustained a right hip femoral neck fracture and is POD #2 right hip hemiarthroplasty/bipolar.      1) maintain surgical dressing and replace if saturation becomes greater than 50%.  2) PT/OT -weightbearing as tolerated on operative extremity with use protective device at all times.  3) posterior hip precautions with abduction pillow at all times with sleeping or in bed for 6 weeks postop.  4) May resume home Eliquis for DVT prophylaxis.  5) continue pain control  6) remaining medical care per primary service.  7) 24-hour postoperative prophylactic antibiotics.  4) Discharge plan:  Pending evaluation by physical therapy regarding plan upon discharge from the hospital. Current plan is for discharge home with home health. Patient will follow-up as outpatient in 2 weeks to remove sutures and for postoperative x-ray. Printed Rx for norco is in the patient's chart.         Regan Menjivar PA-C  [facility]  Orthopedic Surgery  11/22/2024  11:26 AM

## 2024-11-22 NOTE — PROGRESS NOTES
[facility]  Progress Note    Mojgan Jefferson Patient Status:  Inpatient    1945 MRN [id number]   Location [facility] 3SW-A Attending John Womack MD   Hosp Day # 3 PCP Guerrero Naik MD     Subjective:   Up in chair.  No chest pain.  No shortness of breath.  No new focal cardiovascular complaints reported.    Objective:  Vitals:    24 2330 24 0220 24 0451 24 0716   BP: 109/45  126/52 129/45   BP Location: Left arm  Left arm Left arm   Pulse: 63 63 60 64   Resp: 18  18 16   Temp: 98.6 °F (37 °C)  98.8 °F (37.1 °C) 98.1 °F (36.7 °C)   TempSrc: Oral  Oral Oral   SpO2:    94%   Weight:       Height:           Temp (24hrs), Av.1 °F (36.7 °C), Min:97.5 °F (36.4 °C), Max:98.8 °F (37.1 °C)      Medications:   Scheduled:    aspirin  81 mg Oral Daily    apixaban  2.5 mg Oral BID    sennosides  17.2 mg Oral Nightly    docusate sodium  100 mg Oral BID    multivitamin  1 tablet Oral Daily    metoprolol succinate ER  50 mg Oral Daily    rosuvastatin  20 mg Oral Nightly    [START ON 2024] predniSONE  20 mg Oral Daily with breakfast    Followed by    [START ON 2024] predniSONE  10 mg Oral Daily with breakfast       Continuous Infusion:       PRN Medications:     benzocaine-menthol    polyethylene glycol (PEG 3350)    magnesium hydroxide    bisacodyl    fleet enema    ondansetron    metoclopramide    acetaminophen    acetaminophen **OR** HYDROcodone-acetaminophen **OR** HYDROcodone-acetaminophen    morphINE **OR** morphINE **OR** morphINE    melatonin    lidocaine PF    ropivacaine    EPINEPHrine    sodium chloride 0.9% PF    hydrALAzine    pantoprazole    Intake/Output:     Intake/Output Summary (Last 24 hours) at 2024 0909  Last data filed at 2024 0220  Gross per 24 hour   Intake 240 ml   Output 825 ml   Net -585 ml       Wt Readings from Last 3 Encounters:   24 122 lb (55.3 kg)   10/08/24 118 lb 9.6 oz (53.8 kg)   24 137 lb (62.1 kg)        Allergies:  Allergies[1]    Physical Exam:   General:  Well-developed / Well-nourished.  No acute distress.  HEENT:  Normocephalic.  Atraumatic.  No icterus.  Neck:  There is no jugular venous distention.   Cardiovascular:  Cardiovascular examination demonstrates a regular rate and rhythm.  There is normal S1, S2.  There is no S3 or S4.  There are no murmurs, rubs, or gallops.  No click is appreciated.  PMI is nondisplaced with a normal apical impulse.    Pulmonary:  Lungs are clear to auscultation bilaterally.  There are no focal rales, rhonchi, or wheezes.  Good air movement is noted throughout both lung fields.   Abdomen:  The abdomen is soft, non-distended, and non-tender.  Bowel sounds are present and normoactive.  No organomegaly is appreciated.  Extremities:  Extremities do not demonstrate any evidence of peripheral edema.   No cyanosis or clubbing of the digits is appreciated.  Neurologic:  Alert and oriented.  Normal affect.  Integument:  No visible rashes are appreciated.      Laboratory/Data:   Recent Labs   Lab 11/19/24  1748 11/20/24 0356 11/21/24 0446 11/21/24  1504 11/22/24  0508   *   < > 143* 207* 101*   BUN 18   < > 20 25* 34*   CREATSERUM 1.10*   < > 1.35* 2.00* 1.54*   CA 9.5   < > 8.7 8.5* 8.9   ALB 4.6  --   --   --   --    *   < > 134* 130* 130*   K 4.8   < > 5.0 5.3* 5.2*      < > 104 101 102   CO2 24.0   < > 23.0 21.0 23.0   ALKPHO 97  --   --   --   --    AST 26  --   --   --   --    ALT 39  --   --   --   --    BILT 0.7  --   --   --   --    TP 6.8  --   --   --   --     < > = values in this interval not displayed.       Recent Labs   Lab 11/20/24 0356 11/21/24 0446 11/22/24  0508   RBC 3.44* 3.27* 2.61*   HGB 11.0* 10.4* 8.4*   HCT 32.2* 31.4* 25.3*   MCV 93.6 96.0 96.9   MCH 32.0 31.8 32.2   MCHC 34.2 33.1 33.2   RDW 16.1 16.2 16.0   NEPRELIM 16.66* 22.35* 12.36*   WBC 19.8* 25.0* 14.7*   .0 297.0 221.0       Recent Labs   Lab 11/19/24  1748   PTP 13.6    INR 1.03       No results for input(s): \"TROP\", \"CK\" in the last 168 hours.      Tele: SR    Diagnostics:    Echo:   Echo 2/24:    The left ventricle is normal in size.   LV systolic performance is normal.   No left ventricular regional wall motion abnormalities were noted.   There is normal left ventricular wall thickness.   There is trace pulmonic valvular regurgitation.   There is mild tricuspid regurgitation.   RV is poorly visualized (probably normal).   LA volume is within normal range for body size.   The right atrium is mildly dilated.     Assessment:     HTN  -Escalation of BP on presentation reflective of anxiety and pain  -Improved on current Rx - BP low while Lisinopril 20mg on board.  Now off ACE I.  2.     HL  3.     COPD                -O2 with activity  4.     PVD                -S/P R iliac stenting  5.    AAA Hx                -No dilatation reported on CT 10/24  6.    H/O PFO  7.    Metastatic lung CA  8.    Simple fall with R femoral neck Fx   -S/P R hip hemiarthroplasty  9.    H/O DVT                -Chronic Eliquis  10.  RI   -mproved  11.  Mildly increased K+   -Now off ACE I  12.  Post-op anemia    Plan:    BB   Off Lisinopril   Tele   Monitor hemodynamics  Planning renal eval   Eliquis resumed - dose to 2.5mg PO BID   Pain controlled   Discharge planning    Tristian Rey MD  11/22/2024         [1] No Known Allergies

## 2024-11-22 NOTE — OCCUPATIONAL THERAPY NOTE
OCCUPATIONAL THERAPY TREATMENT NOTE - INPATIENT     Room Number: 365/365-A  Session: 2   Number of Visits to Meet Established Goals: 2    Presenting Problem: mechanical fall with R hip fx, s/p R hip hemiarthroplasty 11/20/24      History Related to Current Admission: Patient is a 78 year old female admitted on 11/19/2024 with Presenting Problem: mechanical fall with R hip fx, s/p R hip hemiarthroplasty 11/20/24. Co-Morbidities : HTN, osteopenia, CAD, CHF, AAA, PAD, COPD, PE, lung CA    Prior Level of Function: Patient reports independent in all I/ADL and functional mobility via rollator prior to admission.  ASSESSMENT   Pt has met all OT goals at the current level of care. Pt was seen for a total of 2 visits. Treatment consisted of functional mobility training; self-care training; caregiver instruction. Pt has made functional progress in the areas of: toileting, dressing, functional mobility. Pt will be discharged from skilled OT services at this time. Pt will be discharged home with HHOT. Recommend pt follow post hip precautions, use AE for LB dressing and use a RW. Please re-order OT should a new functional limitation arise during this admission.        Patient continues to benefit from continued skilled OT services: at discharge to promote prior level of function.  Anticipate patient will return home with home health OT.         WEIGHT BEARING RESTRICTION  R Lower Extremity: Weight Bearing as Tolerated      Recommendations for nursing staff:   Transfers: supervision with RW  Toileting location: toilet     TREATMENT SESSION:  Patient Start of Session: Pt was found in her bathroom, toileting with her PCT. Pt's daughter is at the bedside.     FUNCTIONAL TRANSFER ASSESSMENT  Sit to Stand: Edge of Bed; Chair  Edge of Bed: Supervision  Chair: Supervision  Toilet Transfer: Supervision    BED MOBILITY  Supine to Sit : Supervision  Sit to Supine (OT): Supervision    BALANCE ASSESSMENT     FUNCTIONAL ADL ASSESSMENT  UB  Dressing Seated: Supervision  LB Dressing Seated: Supervision  LB Dressing Standing: Supervision  Toileting Seated: Supervision  Toileting Standing: Supervision    ACTIVITY TOLERANCE:                          O2 SATURATIONS       EDUCATION PROVIDED  Patient Education : Role of Occupational Therapy; Plan of Care; Discharge Recommendations; Functional Transfer Techniques; Fall Prevention  Patient's Response to Education: Verbalized Understanding  Family/Caregiver Education : Role of Occupational Therapy; Plan of Care; Discharge Recommendations  Family/Caregiver's Response to Education: Verbalized Understanding    Equipment used: RW, gait belt   Demonstrates functional use, Would benefit from additional trial            Therapist comments: toileting in the bathroom with PCT>stand to RW>walk to sit EOB>UB/LB dressing(with reacher and sock aide)>bed mobility>supine>sit>stand to RW>walk to sit in chair     Patient End of Session: Up in chair;Needs met;Call light within reach;All patient questions and concerns addressed;RN aware of session/findings;Hospital anti-slip socks;SCDs in place;Ice applied;Alarm set;Family present;Discussed recommendations with /    SUBJECTIVE  \"I will do whatever you want me to do.\"     PAIN ASSESSMENT  Ratin  Location: N/A  Management Techniques: Activity promotion;Repositioning;Body mechanics     OBJECTIVE  Precautions: MYAH - posterior;Hip abduction pillow    AM-PAC ‘6-Clicks’ Inpatient Daily Activity Short Form  -   Putting on and taking off regular lower body clothing?: A Little  -   Bathing (including washing, rinsing, drying)?: A Little  -   Toileting, which includes using toilet, bedpan or urinal? : A Little  -   Putting on and taking off regular upper body clothing?: None  -   Taking care of personal grooming such as brushing teeth?: None  -   Eating meals?: None    AM-PAC Score:  Score: 21  Approx Degree of Impairment: 32.79%  Standardized Score (AM-PAC  Scale): 44.27    PLAN  OT Device Recommendations: TBD (safety frame)  OT Treatment Plan: Balance activities;ADL training;Functional transfer training;Endurance training;Patient/Family education;Patient/Family training;Compensatory technique education  Rehab Potential : Good  Frequency: 3x/week    OT Goals:     ADL GOALS   Patient will perform Lower Body Dressing with supervision MET 11/22  Patient will perform Toileting with supervision --> MET 11/21     FUNCTIONAL TRANSFER GOALS   Patient will transfer Supine to Sit with supervision MET 11/22  Patient will transfer Sit to Supine with supervision --> MET 11/21  Patient will transfer to Toilet with supervision --> MET 11/21     ADDITIONAL GOALS   Patient will recall all hip precautions and incorporate into ADL tasks MET 11/22    OT Session Time: 45 minutes  Self-Care Home Management: 30 minutes  Therapeutic Activity: 15 minutes

## 2024-11-22 NOTE — CONSULTS
[facility] - Nephrology  Inpatient Consultation    Mojgan Jefferson Patient Status:  Inpatient    1945 MRN [id number]   Location [facility] 3SW-A Attending John Womack MD   Hosp Day # 3 PCP Guerrero Naik MD     Reason for consult: PRINCESS, hyperkalemia, hyponatremia  Date of consultation: 2024     HISTORY OF PRESENT ILLNESS:     Mojgan Jefferson is a 78 year old female with a medical history notable for hypertension, HFpEF, lung cancer on immunotherapy who presents s/p fall. Noted to have displaced right femoral neck fracture. Now s/p right hip hemiarthoplasty/bipolar on . Nephrology consulted due to PRINCESS developed after post-op day 1. Also with hyperkalemia, hyponatremia. Patient complains of inability to urinate too well after surgery. Also admits to poor PO intake in setting of reflux.     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 She is part of a clinical trial there where she was receiving immunotherapy. Currently on a prednisone taper. Not on any antibiotic prophylaxis.     REVIEW OF SYSTEMS:     ROS as above, otherwise negative    HISTORY:     Past Medical History:    AAA (abdominal aortic aneurysm) (HCC)    3x3.4 cm, discussed with pt 9/3/20, recheck yearly, quit tob in , treat risk factors.     Abnormal echocardiogram    possible pulm htn, went to [facility] for eval 17 and w/u was negative, note received 18    Abnormal stress echo    milan Bellamy 7/15/21, 21    Agatston coronary artery calcium score less than 100    72 at [place], sees Dr. Bellamy, last visit 7/15/21    Atherosclerosis of abdominal aorta (HCC)    at [facility]    Cancer (HCC)    COPD (chronic obstructive pulmonary disease) (HCC)    on cardiopulmonary stress test Dr. Bellamy    Diastolic dysfunction    going to [facility], started on furosemide    Eczema    saw derm 10/13    Elevated liver enzymes    presumed to be due to lipitor    Encounter for eye exam    mehdi Wheat      Esophageal reflux    meds prn    Ex-cigarette smoker    Heart failure with preserved ejection fraction (HCC)    dx at [place], lasix added    High blood pressure    High cholesterol    Dr. Bellamy managing    History of cardiovascular stress test    obstructive pulmonary component on cardiopulmonary stress test at [facility] ordered by Dr. Bellamy     History of Doppler echocardiogram    at [facility], Dr. Bellamy    History of normal resting EKG    at [facility]    History of nuclear stress test    neg at [place], EF 57%    Hypercalcemia    Iliac artery occlusion, right ([facility])    Dr. Cantu. had lithotripsy and stent, last US 21 at [facility]: aortic atherosclerosis, patent R iliac stent    Left atrial enlargement    Dr. Sonja Roe at [facility], EF 77%    Living will in place    Osteopenia    improved , further improvement , d/c med after 5 years, recheck     PFO (patent foramen ovale) ([facility])    dx at [place], felt not to be clinically signicant. no tx needed    Pulmonary nodule, left    on Ct at [place], also with mildly enlarged nodes    PVD (peripheral vascular disease) ([facility])    saw Dr. Cantu, stent R iliac on 10/6/20, claudication on R side. , f/u 21    Refused influenza vaccine    she's considering for     Screening for cardiovascular condition    normal CGXT with EF of 70%, see below for abn stress echo    Sinus bradycardia    with LAE     Past Surgical History:   Procedure Laterality Date    Colonoscopy N/A 2015    Procedure: COLONOSCOPY;  Surgeon: Garrett Kim MD;  Location:  ENDOSCOPY          x3 with anesthesia    Stent place iliac art o/p ini  10/06/2020    KARENA Cantu     Family History   Problem Relation Age of Onset    Heart Disorder Father     Heart Disorder Son         possible heart prob    Other (Other) Son         transverse myelitis, psoriasis    Hypertension Sister     Hypertension Brother     Heart Disorder Brother 76        MI    Hypertension Brother     Hypertension Brother     Hypertension  Sister     No Known Problems Daughter     No Known Problems Daughter     No Known Problems Brother     No Known Problems Brother     Breast Cancer Cousin 58        M-cousin ago of dx 58      reports that she quit smoking about 13 years ago. Her smoking use included cigarettes. She started smoking about 53 years ago. She has a 12 pack-year smoking history. She has never used smokeless tobacco. She reports current alcohol use. She reports that she does not use drugs.  Allergies[1]    MEDICATIONS:       Current Facility-Administered Medications:     sodium chloride 0.9% infusion, , Intravenous, Continuous    benzocaine-menthol (Cepacol) lozenge 1 lozenge, 1 lozenge, Oral, PRN    aspirin chewable tab 81 mg, 81 mg, Oral, Daily    apixaban (Eliquis) tab 2.5 mg, 2.5 mg, Oral, BID    sennosides (Senokot) tab 17.2 mg, 17.2 mg, Oral, Nightly    docusate sodium (Colace) cap 100 mg, 100 mg, Oral, BID    polyethylene glycol (PEG 3350) (Miralax) 17 g oral packet 17 g, 17 g, Oral, Daily PRN    magnesium hydroxide (Milk of Magnesia) 400 MG/5ML oral suspension 30 mL, 30 mL, Oral, Daily PRN    bisacodyl (Dulcolax) 10 MG rectal suppository 10 mg, 10 mg, Rectal, Daily PRN    fleet enema (Fleet) rectal enema 133 mL, 1 enema, Rectal, Once PRN    ondansetron (Zofran) 4 MG/2ML injection 4 mg, 4 mg, Intravenous, Q6H PRN    metoclopramide (Reglan) 5 mg/mL injection 5 mg, 5 mg, Intravenous, Q8H PRN    multivitamin (Tab-A-Nara/Beta Carotene) tab 1 tablet, 1 tablet, Oral, Daily    metoprolol succinate ER (Toprol XL) 24 hr tab 50 mg, 50 mg, Oral, Daily    rosuvastatin (Crestor) tab 20 mg, 20 mg, Oral, Nightly    acetaminophen (Tylenol Extra Strength) tab 500 mg, 500 mg, Oral, Q4H PRN    acetaminophen (Tylenol) tab 650 mg, 650 mg, Oral, Q4H PRN **OR** HYDROcodone-acetaminophen (Norco) 5-325 MG per tab 1 tablet, 1 tablet, Oral, Q4H PRN **OR** HYDROcodone-acetaminophen (Norco) 5-325 MG per tab 2 tablet, 2 tablet, Oral, Q4H PRN    morphINE PF 2  MG/ML injection 1 mg, 1 mg, Intravenous, Q2H PRN **OR** morphINE PF 2 MG/ML injection 2 mg, 2 mg, Intravenous, Q2H PRN **OR** morphINE PF 4 MG/ML injection 4 mg, 4 mg, Intravenous, Q2H PRN    melatonin tab 3 mg, 3 mg, Oral, Nightly PRN    lidocaine PF (Xylocaine-MPF) 1% injection, 2 mL, Injection, PRN    ropivacaine (Naropin) 0.5% injection, 30 mL, Injection, PRN    EPINEPHrine (Adrenalin) 1 MG/ML injection (Allergic Reaction Kit) 1 mg, 1 mg, Injection, PRN    sodium chloride 0.9% PF injection 10 mL, 10 mL, Injection, PRN    hydrALAzine (Apresoline) 20 mg/mL injection 10 mg, 10 mg, Intravenous, Q4H PRN    [COMPLETED] predniSONE (Deltasone) tab 40 mg, 40 mg, Oral, Daily **FOLLOWED BY** [START ON 2024] predniSONE (Deltasone) tab 20 mg, 20 mg, Oral, Daily with breakfast **FOLLOWED BY** [START ON 2024] predniSONE (Deltasone) tab 10 mg, 10 mg, Oral, Daily with breakfast    pantoprazole (Protonix) DR tab 20 mg, 20 mg, Oral, Daily PRN    Prescriptions Prior to Admission[2]     PHYSICAL EXAM:     Vital Signs: /45 (BP Location: Left arm)   Pulse 65   Temp 97.7 °F (36.5 °C) (Oral)   Resp 16   Ht 5' 1\" (1.549 m)   Wt 122 lb (55.3 kg)   SpO2 100%   BMI 23.05 kg/m²   Temp (24hrs), Av °F (36.7 °C), Min:97.5 °F (36.4 °C), Max:98.8 °F (37.1 °C)       Intake/Output Summary (Last 24 hours) at 2024 1147  Last data filed at 2024 1115  Gross per 24 hour   Intake 240 ml   Output 1425 ml   Net -1185 ml     Wt Readings from Last 3 Encounters:   24 122 lb (55.3 kg)   10/08/24 118 lb 9.6 oz (53.8 kg)   24 137 lb (62.1 kg)       General: no acute distress  HEENT: normocephalic, atraumatic  CV: RRR  Respiratory: no respiratory distress  Abdomen: soft, non-tender  Extremities: no edema bilaterally  Skin: warm, dry  Neuro: awake, alert    LABORATORY DATA:     Lab Results   Component Value Date     (H) 2024    BUN 34 (H) 2024    CREATSERUM 1.54 (H) 2024    ANIONGAP 5  11/22/2024    GFR 64 11/03/2017    GFRNAA 59 (L) 07/30/2011    GFRAA 71 07/30/2011    CA 8.9 11/22/2024    OSMOCALC 278 11/22/2024    ALKPHO 97 11/19/2024    AST 26 11/19/2024    ALT 39 11/19/2024    BILT 0.7 11/19/2024    TP 6.8 11/19/2024    ALB 4.6 11/19/2024    GLOBULIN 2.2 11/19/2024    AGRATIO 2.5 08/17/2015     (L) 11/22/2024    K 5.2 (H) 11/22/2024     11/22/2024    CO2 23.0 11/22/2024     Lab Results   Component Value Date    WBC 14.7 (H) 11/22/2024    RBC 2.61 (L) 11/22/2024    HGB 8.4 (L) 11/22/2024    HCT 25.3 (L) 11/22/2024    .0 11/22/2024    MPV 11.6 (H) 07/30/2011    MCV 96.9 11/22/2024    MCH 32.2 11/22/2024    MCHC 33.2 11/22/2024    RDW 16.0 11/22/2024    NEPRELIM 12.36 (H) 11/22/2024    NEPERCENT 84.2 11/22/2024    LYPERCENT 8.4 11/22/2024    MOPERCENT 5.5 11/22/2024    EOPERCENT 0.8 11/22/2024    BAPERCENT 0.1 11/22/2024    NE 12.36 (H) 11/22/2024    LYMABS 1.23 11/22/2024    MOABSO 0.80 11/22/2024    EOABSO 0.11 11/22/2024    BAABSO 0.01 11/22/2024     Lab Results   Component Value Date    CREUR 41.00 10/05/2024     Lab Results   Component Value Date    COLORUR Colorless (A) 11/19/2024    CLARITY Clear 11/19/2024    SPECGRAVITY 1.013 11/19/2024    GLUUR Normal 11/19/2024    BILUR Negative 11/19/2024    KETUR Negative 11/19/2024    BLOODURINE Negative 11/19/2024    PHURINE 7.0 11/19/2024    PROUR Negative 11/19/2024    UROBILINOGEN Normal 11/19/2024    NITRITE Negative 11/19/2024    LEUUR Negative 11/19/2024    NMIC Microscopic not indicated 11/19/2024    WBCUR 1-5 10/05/2024    RBCUR 0-2 10/05/2024    EPIUR Few (A) 10/05/2024    BACUR None Seen 10/05/2024       IMAGING:     Reviewed    ASSESSMENT:     # Non-oliguric PRINCESS - improving  -Elevated creatinine likely pre-renal in etiology in setting of volume contraction, hypotension s/p right hip hemiarthoplasty/bipolar for displaced right femoral neck fracture  -Ultrasound shows small simple cyst of left kidney, no  hydronephrosis    # 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    # Hyponatremia  -Retention?    # Hyperkalemia  -In setting of lisinopril?    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  -Bicarb supplementation at home? Not on medication list  -Improved    # HTN/Volume Status  -Home medications: lisinopril 10mg daily, metoprolol 50mg daily    # Anemia    PLAN:     -Kidney function improving with IVF, continue for now  -Encourage PO intake  -Obtain urinalysis, urine lytes to better assess  -Continue steroid taper for AIN. Patient transitioning to 20mg daily tomorrow so can likely avoid PCP prophylaxis going forward.   -Bladder scan routinely, straight cath as needed  -Hold lisinopril  -Acid levels improved, not on bicarb supplementation at this time, can monitor for now  -Monitor BP closely, maintain renal perfusion pressures  -Avoid nephrotoxins and renally dose medications for creatinine clearance  -Monitor intake and output daily  -Daily weights            We will continue to follow.    Thank you for allowing us to participate in the care of this patient.         Jyothi Castro DO  [facility] - Nephrology           [1] No Known Allergies  [2]   Medications Prior to Admission   Medication Sig Dispense Refill Last Dose/Taking    predniSONE 20 MG Oral Tab Take 2 tablets (40 mg total) by mouth daily.   11/19/2024 at  8:00 AM    lisinopril 20 MG Oral Tab Take 1 tablet (20 mg total) by mouth daily.   11/19/2024 at  8:00 AM    apixaban 5 MG Oral Tab Take 2 tabs (10mg) by mouth twice daily for 7 days, then take 1 tab (5mg) by mouth twice daily thereafter. (Patient taking differently: Take 1 tablet (5 mg total) by mouth 2 (two) times daily. Take 2 tabs (10mg) by mouth twice daily for 7 days, then take 1 tab (5mg) by mouth twice daily thereafter.) 74  tablet 0 11/19/2024 at  8:00 AM    metoprolol succinate 50 MG Oral Tablet 24 Hr Take 1 tablet (50 mg total) by mouth daily. 90 tablet 3 11/19/2024 at  8:00 AM    Rosuvastatin Calcium 20 MG Oral Tab Take 1 tablet (20 mg total) by mouth nightly.   11/18/2024 at  9:00 PM    OMEPRAZOLE 20 MG Oral Capsule Delayed Release TAKE 1 CAPSULE(20 MG) BY MOUTH DAILY (Patient taking differently: Take 1 capsule (20 mg total) by mouth as needed.) 90 capsule 3 Past Week    ASPIRIN 81 MG OR TABS Take 1 tablet (81 mg total) by mouth daily.   11/19/2024 at  8:00 AM    FISH OIL 1000 MG OR CAPS Take 1,000 mg by mouth daily.   11/19/2024 Noon    Albuterol Sulfate  (90 Base) MCG/ACT Inhalation Aero Soln 2 puffs 15 minutes before exercise. (Patient not taking: Reported on 11/19/2024) 1 Inhaler 12 Not Taking

## 2024-11-22 NOTE — PROGRESS NOTES
[facility] Hospitalist Progress Note                                                                     [facility]   part of [facility]  12/21/1945    SUBJECTIVE: Patient denies any chest pain, palpitations, shortness of breath, cough, nausea, vomiting, abdominal pain. Post operative pain controlled     OBJECTIVE:  Temp:  [97.5 °F (36.4 °C)-98.8 °F (37.1 °C)] 98.1 °F (36.7 °C)  Pulse:  [56-75] 64  Resp:  [16-23] 16  BP: ()/(37-56) 129/45  SpO2:  [94 %-98 %] 94 %  Exam  Gen: No acute distress, alert and oriented  Pulm: Lungs clear bilaterally, normal respiratory effort, no crackles, no wheezing  CV: Heart with regular rate and rhythm, no murmur.  Abd: Abdomen soft, nontender, nondistended, bowel sounds present  MSK: No significant pitting edema or tenderness of the LE  Skin: no new rashes or lesions    Labs:   Recent Labs   Lab 11/19/24 1748 11/20/24  0356 11/21/24  0446 11/22/24  0508   WBC 25.4* 19.8* 25.0* 14.7*   HGB 12.9 11.0* 10.4* 8.4*   MCV 95.1 93.6 96.0 96.9   .0 293.0 297.0 221.0   INR 1.03  --   --   --        Recent Labs   Lab 11/19/24 1748 11/20/24  0356 11/21/24  0446 11/21/24  1504 11/22/24  0508   * 133* 134* 130* 130*   K 4.8 4.4 5.0 5.3* 5.2*    104 104 101 102   CO2 24.0 25.0 23.0 21.0 23.0   BUN 18 18 20 25* 34*   CREATSERUM 1.10* 0.94 1.35* 2.00* 1.54*   CA 9.5 8.9 8.7 8.5* 8.9   MG  --  1.8 1.8  --  2.2   * 110* 143* 207* 101*       Recent Labs   Lab 11/19/24 1748   ALT 39   AST 26   ALB 4.6       No results for input(s): \"PGLU\" in the last 168 hours.    Meds:   Scheduled:    aspirin  81 mg Oral Daily    apixaban  2.5 mg Oral BID    sennosides  17.2 mg Oral Nightly    docusate sodium  100 mg Oral BID    multivitamin  1 tablet Oral Daily    metoprolol succinate ER  50 mg Oral Daily    rosuvastatin  20 mg Oral Nightly    [START ON 11/23/2024] predniSONE  20 mg Oral Daily with breakfast     Followed by    [START ON 11/30/2024] predniSONE  10 mg Oral Daily with breakfast     Continuous Infusions:   PRN:   benzocaine-menthol    polyethylene glycol (PEG 3350)    magnesium hydroxide    bisacodyl    fleet enema    ondansetron    metoclopramide    acetaminophen    acetaminophen **OR** HYDROcodone-acetaminophen **OR** HYDROcodone-acetaminophen    morphINE **OR** morphINE **OR** morphINE    melatonin    lidocaine PF    ropivacaine    EPINEPHrine    sodium chloride 0.9% PF    hydrALAzine    pantoprazole    ASSESSMENT / PLAN:   78 year old female with history of AAA, ??COPD, Diastolic dysfunction, gerd, PAD, HTN, HLD, hx PFO, lung cancer on immunotherapy presenting with a mechanical fall and right leg pain.      Right femoral neck fracture  -ortho consulted--> OR --> POD # 2 s/p right hip hemiarthroplasty/bipolar  -prn iv morphine   -prn po norco  -dc pain meds per ortho    Post Operative Anemia  -no active bleeding at this time  -no transfusion needed  -recheck levels in am      HTN  -sbp elevated--> improved, low yesterday, better today   -continue metoprolol  -continue lisinopril --> stoped  -iv hydralazine prn for sbp< 160  -cardiology following     HLD  -rosuvastatin     PAD  -asa on hold--> resume  -statin     Hx PE  -eliquis on hold due to fx and surgery in near future--> resumed      GERD  -pantoprazole     Recent immunotherapy induced kidney injury  -continue prednisone if ok with ortho     Leukocytosis--> improving  -no obvious infection  -likely leukemoid reaction to fracture and prednisone use.      PRINCESS--> improving  -if worse consult renal for evaluation and tx--> Renal consulted    Quality:  DVT Prophylaxis: scd, eliquis  CODE status: Full  Matos: purannabelleick     Plan of care discussed with patient, daughter and staff     Dispo: no discharge.      John Womack MD  [facility]  [phone]

## 2024-11-23 VITALS
HEART RATE: 63 BPM | RESPIRATION RATE: 18 BRPM | HEIGHT: 61 IN | OXYGEN SATURATION: 98 % | DIASTOLIC BLOOD PRESSURE: 46 MMHG | SYSTOLIC BLOOD PRESSURE: 118 MMHG | WEIGHT: 124.56 LBS | TEMPERATURE: 98 F | BODY MASS INDEX: 23.52 KG/M2

## 2024-11-23 LAB
ANION GAP SERPL CALC-SCNC: 6 MMOL/L (ref 0–18)
BASOPHILS # BLD AUTO: 0.01 X10(3) UL (ref 0–0.2)
BASOPHILS NFR BLD AUTO: 0.1 %
BUN BLD-MCNC: 26 MG/DL (ref 9–23)
CALCIUM BLD-MCNC: 8.7 MG/DL (ref 8.7–10.4)
CHLORIDE SERPL-SCNC: 107 MMOL/L (ref 98–112)
CO2 SERPL-SCNC: 20 MMOL/L (ref 21–32)
CREAT BLD-MCNC: 1.12 MG/DL
EGFRCR SERPLBLD CKD-EPI 2021: 50 ML/MIN/1.73M2 (ref 60–?)
EOSINOPHIL # BLD AUTO: 0.06 X10(3) UL (ref 0–0.7)
EOSINOPHIL NFR BLD AUTO: 0.4 %
ERYTHROCYTE [DISTWIDTH] IN BLOOD BY AUTOMATED COUNT: 15.7 %
GLUCOSE BLD-MCNC: 106 MG/DL (ref 70–99)
HCT VFR BLD AUTO: 24.5 %
HGB BLD-MCNC: 8 G/DL
IMM GRANULOCYTES # BLD AUTO: 0.09 X10(3) UL (ref 0–1)
IMM GRANULOCYTES NFR BLD: 0.6 %
LYMPHOCYTES # BLD AUTO: 1.29 X10(3) UL (ref 1–4)
LYMPHOCYTES NFR BLD AUTO: 8.4 %
MAGNESIUM SERPL-MCNC: 2 MG/DL (ref 1.6–2.6)
MCH RBC QN AUTO: 31.7 PG (ref 26–34)
MCHC RBC AUTO-ENTMCNC: 32.7 G/DL (ref 31–37)
MCV RBC AUTO: 97.2 FL
MONOCYTES # BLD AUTO: 0.76 X10(3) UL (ref 0.1–1)
MONOCYTES NFR BLD AUTO: 4.9 %
NEUTROPHILS # BLD AUTO: 13.18 X10 (3) UL (ref 1.5–7.7)
NEUTROPHILS # BLD AUTO: 13.18 X10(3) UL (ref 1.5–7.7)
NEUTROPHILS NFR BLD AUTO: 85.6 %
OSMOLALITY SERPL CALC.SUM OF ELEC: 281 MOSM/KG (ref 275–295)
PHOSPHATE SERPL-MCNC: 2.6 MG/DL (ref 2.4–5.1)
PLATELET # BLD AUTO: 235 10(3)UL (ref 150–450)
POTASSIUM SERPL-SCNC: 4.7 MMOL/L (ref 3.5–5.1)
RBC # BLD AUTO: 2.52 X10(6)UL
SODIUM SERPL-SCNC: 133 MMOL/L (ref 136–145)
WBC # BLD AUTO: 15.4 X10(3) UL (ref 4–11)

## 2024-11-23 PROCEDURE — 83735 ASSAY OF MAGNESIUM: CPT | Performed by: HOSPITALIST

## 2024-11-23 PROCEDURE — 80048 BASIC METABOLIC PNL TOTAL CA: CPT | Performed by: HOSPITALIST

## 2024-11-23 PROCEDURE — 84100 ASSAY OF PHOSPHORUS: CPT | Performed by: STUDENT IN AN ORGANIZED HEALTH CARE EDUCATION/TRAINING PROGRAM

## 2024-11-23 PROCEDURE — 85025 COMPLETE CBC W/AUTO DIFF WBC: CPT | Performed by: HOSPITALIST

## 2024-11-23 RX ORDER — PREDNISONE 10 MG/1
TABLET ORAL
Status: SHIPPED | COMMUNITY
Start: 2024-11-24

## 2024-11-23 NOTE — PROGRESS NOTES
[facility] Hospitalist Progress Note                                                                     [facility]   part of [facility]  12/21/1945    SUBJECTIVE: Patient denies any chest pain, palpitations, shortness of breath, cough, nausea, vomiting, abdominal pain. Post operative pain controlled     OBJECTIVE:  Temp:  [97.5 °F (36.4 °C)-98.1 °F (36.7 °C)] 97.5 °F (36.4 °C)  Pulse:  [62-87] 62  Resp:  [16] 16  BP: ()/(40-55) 151/55  SpO2:  [96 %-100 %] 96 %  Exam  Gen: No acute distress, alert and oriented  Pulm: Lungs clear bilaterally, normal respiratory effort, no crackles, no wheezing  CV: Heart with regular rate and rhythm, no murmur.  Abd: Abdomen soft, nontender, nondistended, bowel sounds present  MSK: No significant pitting edema or tenderness of the LE  Skin: no new rashes or lesions    Labs:   Recent Labs   Lab 11/19/24  1748 11/20/24  0356 11/21/24  0446 11/22/24  0508 11/23/24  0542   WBC 25.4* 19.8* 25.0* 14.7* 15.4*   HGB 12.9 11.0* 10.4* 8.4* 8.0*   MCV 95.1 93.6 96.0 96.9 97.2   .0 293.0 297.0 221.0 235.0   INR 1.03  --   --   --   --        Recent Labs   Lab 11/20/24  0356 11/21/24  0446 11/21/24  1504 11/22/24  0508 11/23/24  0542   * 134* 130* 130* 133*   K 4.4 5.0 5.3* 5.2* 4.7    104 101 102 107   CO2 25.0 23.0 21.0 23.0 20.0*   BUN 18 20 25* 34* 26*   CREATSERUM 0.94 1.35* 2.00* 1.54* 1.12*   CA 8.9 8.7 8.5* 8.9 8.7   MG 1.8 1.8  --  2.2 2.0   PHOS  --   --   --   --  2.6   * 143* 207* 101* 106*       Recent Labs   Lab 11/19/24  1748   ALT 39   AST 26   ALB 4.6       No results for input(s): \"PGLU\" in the last 168 hours.    Meds:   Scheduled:    aspirin  81 mg Oral Daily    apixaban  2.5 mg Oral BID    sennosides  17.2 mg Oral Nightly    docusate sodium  100 mg Oral BID    multivitamin  1 tablet Oral Daily    metoprolol succinate ER  50 mg Oral Daily    rosuvastatin  20 mg Oral Nightly     predniSONE  20 mg Oral Daily with breakfast    Followed by    [START ON 11/30/2024] predniSONE  10 mg Oral Daily with breakfast     Continuous Infusions:    sodium chloride 50 mL/hr at 11/22/24 1100     PRN:   benzocaine-menthol    polyethylene glycol (PEG 3350)    magnesium hydroxide    bisacodyl    fleet enema    ondansetron    metoclopramide    acetaminophen    acetaminophen **OR** HYDROcodone-acetaminophen **OR** HYDROcodone-acetaminophen    morphINE **OR** morphINE **OR** morphINE    melatonin    lidocaine PF    ropivacaine    EPINEPHrine    sodium chloride 0.9% PF    hydrALAzine    pantoprazole    ASSESSMENT / PLAN:   78 year old female with history of AAA, ??COPD, Diastolic dysfunction, gerd, PAD, HTN, HLD, hx PFO, lung cancer on immunotherapy presenting with a mechanical fall and right leg pain.      Right femoral neck fracture  -ortho consulted--> OR --> POD # 3 s/p right hip hemiarthroplasty/bipolar  -prn iv morphine   -prn po norco  -dc pain meds per ortho    Post Operative Anemia  -no active bleeding at this time  -no transfusion needed     HTN  -sbp elevated--> improved  -continue metoprolol  -continue lisinopril --> stoped  -iv hydralazine prn for sbp< 160  -cardiology following     HLD  -rosuvastatin     PAD  -asa on hold--> resume  -statin     Hx PE  -eliquis on hold due to fx and surgery in near future--> resumed      GERD  -pantoprazole     Recent immunotherapy induced kidney injury  -continue prednisone if ok with ortho     Leukocytosis  -no obvious infection  -likely leukemoid reaction to fracture and prednisone use.      PRINCESS--> improving  -if worse consult renal for evaluation and tx--> Renal consulted-->Continue ivf    Quality:  DVT Prophylaxis: scd, eliquis  CODE status: Full  Matos: puriwick     Plan of care discussed with patient, daughter and staff     Dispo:  discharge today if ok with renal and cards     John Womack MD  [facility]  [phone]

## 2024-11-23 NOTE — PROGRESS NOTES
[facility]  Progress Note    Mojgan Jefferson Patient Status:  Inpatient    1945 MRN [id number]   Location [facility] 3SW-A Attending John Womack MD   Hosp Day # 4 PCP Guerrero Naik MD     Subjective:   No acute events overnight. No chest pain.  No shortness of breath.        Objective:  Vitals:    24 0000 24 0400 24 0630 24 0810   BP: 111/50 151/55  155/48   BP Location: Left arm Right arm  Right arm   Pulse: 62   76   Resp: 16 16  17   Temp: 97.5 °F (36.4 °C) 97.5 °F (36.4 °C)  97.8 °F (36.6 °C)   TempSrc: Oral Oral  Oral   SpO2: 98% 96%  97%   Weight:   124 lb 9 oz (56.5 kg)    Height:           Temp (24hrs), Av.8 °F (36.6 °C), Min:97.5 °F (36.4 °C), Max:98.1 °F (36.7 °C)      Medications:   Scheduled:    aspirin  81 mg Oral Daily    apixaban  2.5 mg Oral BID    sennosides  17.2 mg Oral Nightly    docusate sodium  100 mg Oral BID    multivitamin  1 tablet Oral Daily    metoprolol succinate ER  50 mg Oral Daily    rosuvastatin  20 mg Oral Nightly    predniSONE  20 mg Oral Daily with breakfast    Followed by    [START ON 2024] predniSONE  10 mg Oral Daily with breakfast       Continuous Infusion:    sodium chloride 50 mL/hr at 24 1100       PRN Medications:     benzocaine-menthol    polyethylene glycol (PEG 3350)    magnesium hydroxide    bisacodyl    fleet enema    ondansetron    metoclopramide    acetaminophen    acetaminophen **OR** HYDROcodone-acetaminophen **OR** HYDROcodone-acetaminophen    morphINE **OR** morphINE **OR** morphINE    melatonin    lidocaine PF    ropivacaine    EPINEPHrine    sodium chloride 0.9% PF    hydrALAzine    pantoprazole    Intake/Output:     Intake/Output Summary (Last 24 hours) at 2024 1117  Last data filed at 2024 0810  Gross per 24 hour   Intake 1545 ml   Output 2175 ml   Net -630 ml       Wt Readings from Last 3 Encounters:   24 124 lb 9 oz (56.5 kg)   10/08/24 118 lb 9.6 oz (53.8 kg)   24  137 lb (62.1 kg)       Allergies:  Allergies[1]    Physical Exam:   General:  Well-developed / Well-nourished.  No acute distress.  HEENT:  Normocephalic.  Atraumatic.  No icterus.  Neck:  There is no jugular venous distention.   Cardiovascular:  Cardiovascular examination demonstrates a regular rate and rhythm.  There is normal S1, S2.  There is no S3 or S4.  There are no murmurs, rubs, or gallops.  No click is appreciated.  PMI is nondisplaced with a normal apical impulse.    Pulmonary:  Lungs are clear to auscultation bilaterally.  There are no focal rales, rhonchi, or wheezes.  Good air movement is noted throughout both lung fields.   Abdomen:  The abdomen is soft, non-distended, and non-tender.  Bowel sounds are present and normoactive.  No organomegaly is appreciated.  Extremities:  Extremities do not demonstrate any evidence of peripheral edema.   No cyanosis or clubbing of the digits is appreciated.  Neurologic:  Alert and oriented.  Normal affect.  Integument:  No visible rashes are appreciated.      Laboratory/Data:   Recent Labs   Lab 11/19/24  1748 11/20/24  0356 11/21/24  1504 11/22/24  0508 11/23/24  0542   *   < > 207* 101* 106*   BUN 18   < > 25* 34* 26*   CREATSERUM 1.10*   < > 2.00* 1.54* 1.12*   CA 9.5   < > 8.5* 8.9 8.7   ALB 4.6  --   --   --   --    *   < > 130* 130* 133*   K 4.8   < > 5.3* 5.2* 4.7      < > 101 102 107   CO2 24.0   < > 21.0 23.0 20.0*   ALKPHO 97  --   --   --   --    AST 26  --   --   --   --    ALT 39  --   --   --   --    BILT 0.7  --   --   --   --    TP 6.8  --   --   --   --     < > = values in this interval not displayed.       Recent Labs   Lab 11/21/24  0446 11/22/24  0508 11/23/24  0542   RBC 3.27* 2.61* 2.52*   HGB 10.4* 8.4* 8.0*   HCT 31.4* 25.3* 24.5*   MCV 96.0 96.9 97.2   MCH 31.8 32.2 31.7   MCHC 33.1 33.2 32.7   RDW 16.2 16.0 15.7   NEPRELIM 22.35* 12.36* 13.18*   WBC 25.0* 14.7* 15.4*   .0 221.0 235.0       Recent Labs   Lab  11/19/24  1748   PTP 13.6   INR 1.03       No results for input(s): \"TROP\", \"CK\" in the last 168 hours.      Tele: SR    Diagnostics:    Echo:   Echo 2/24:    The left ventricle is normal in size.   LV systolic performance is normal.   No left ventricular regional wall motion abnormalities were noted.   There is normal left ventricular wall thickness.   There is trace pulmonic valvular regurgitation.   There is mild tricuspid regurgitation.   RV is poorly visualized (probably normal).   LA volume is within normal range for body size.   The right atrium is mildly dilated.     Assessment:     HTN  -Escalation of BP on presentation reflective of anxiety and pain  -Improved on current Rx - BP low while Lisinopril 20mg on board.  Now off ACE I.  2.     HL  3.     COPD                -O2 with activity  4.     PVD                -S/P R iliac stenting  5.    AAA Hx                -No dilatation reported on CT 10/24  6.    H/O PFO  7.    Metastatic lung CA  8.    Simple fall with R femoral neck Fx   -S/P R hip hemiarthroplasty  9.    H/O DVT                -Chronic Eliquis  10.  RI   -mproved  11.  Mildly increased K+   -Now off ACE I  12.  Post-op anemia    Plan:    BB   Off Lisinopril   Eliquis resumed - dose to 2.5mg PO BID   Pain controlled  No arrhythmia on tele.     Can be discharged from CV standpoint.     Omega Charles DO  Cardiologist, [facility]           [1] No Known Allergies

## 2024-11-23 NOTE — PLAN OF CARE
Pt denies pain at this time. Vss at this time ivf infusing as ordered. Plan home w hh when ready, pt & dtr both hoping later today. Dtr updated via phone call. Call light within reach. Voiding freely in bathroom with min ast walker & gait belt. Pt & family verbalized understanding of poc & call dont fall protocol.

## 2024-11-23 NOTE — PROGRESS NOTES
NURSING DISCHARGE NOTE    Discharged Home via Wheelchair.  Accompanied by Support staff  Belongings Taken by patient/family.    Patient cleared for discharge by hospitalist, ortho, cardiology, and nephrology. IV removed. Patient educated on all AVS discharge information, all questions answered. Instructed to  medication from pharmacy. Patient given script for norco. Patient brought down to [facility] parking garage with all belongings and paperwork to be driven home by daughter. Discharging with [facility].

## 2024-11-23 NOTE — PROGRESS NOTES
[facility]   part of [facility] Patient Status:  Inpatient    1945 MRN [id number]   Location [facility] 3SW-A Attending John Womack MD   Hosp Day # 4 PCP Guerrero Naik MD       Subjective:  POD #3 right hip hemiarthroplasty by Dr. Anand     Patient sitting up in chair, minimal pain  Denies fever, chills, chest pain, shortness of breath, calf pain        Objective:  Vital signs in last 24 hours:  Temp:  [97.5 °F (36.4 °C)-98.1 °F (36.7 °C)] 97.8 °F (36.6 °C)  Pulse:  [62-87] 76  Resp:  [16-17] 17  BP: ()/(40-55) 155/48  SpO2:  [96 %-100 %] 97 %      General: A&Ox3, NAD  MSK: Dressing clean, dry, intact. Compartments soft, nontender. +EHL/PF/DF. Sensation intact. Pulses intact. No signs of DVT.         Data Review  CBC:   Lab Results   Component Value Date    WBC 15.4 (H) 2024    RBC 2.52 (L) 2024    HGB 8.0 (L) 2024    HCT 24.5 (L) 2024    .0 2024           Assessment/Plan:    POD #3 right hip hemiarthroplasty    Continue present management  PT - WBAT RLE. Posterior hip precautions  DVT prophylaxis with Eliquis  Discharge plan  May discharge home with home health from ortho perspective once cleared by other services  Follow up with Dr. Anand or his PA, Regan Menjivar, in 2 weeks  Rx for Norco is printed in chart        Elizabeth CHEATHAM  Discussed with Dr. Anand

## 2024-11-23 NOTE — PLAN OF CARE
Patient alert and oriented x4. VSS on RA. Pain controlled with PO medications. Denies n/t. Aquacel dressing to right hip dry and intact. Declining SCDs, ankle pumps encouraged, IS encouraged. Pt up with standby and the walker. Voiding freely in bathroom. Tolerating diet, no c/o n/v.     Plan: discharge home when cleared by all services

## 2024-11-23 NOTE — PROGRESS NOTES
[facility]   part of [facility]    Nephrology Progress Note    Mojgan Jefferson Attending:  John Womack MD       SUBJECTIVE:     Needed straight cath for urinary retention.  No sob, leg swelling.    PHYSICAL EXAM:     Vital Signs: /46 (BP Location: Right arm)   Pulse 63   Temp 97.9 °F (36.6 °C) (Oral)   Resp 18   Ht 154.9 cm (5' 1\")   Wt 124 lb 9 oz (56.5 kg)   SpO2 98%   BMI 23.54 kg/m²   Temp (24hrs), Av.8 °F (36.6 °C), Min:97.5 °F (36.4 °C), Max:98.1 °F (36.7 °C)       Intake/Output Summary (Last 24 hours) at 2024 1426  Last data filed at 2024 1150  Gross per 24 hour   Intake 1665 ml   Output 2150 ml   Net -485 ml     Wt Readings from Last 3 Encounters:   24 124 lb 9 oz (56.5 kg)   10/08/24 118 lb 9.6 oz (53.8 kg)   24 137 lb (62.1 kg)       General: pleasant, well appearing  Skin: no visible rashes  HEENT: NCAT  Cardiac: Regular rate and rhythm, no murmur/gallop or rub  Lungs: CTAB, no wheeze, no rale, no rhonchi  Abdomen: Soft, NTND  Extremities: warm, well perfused, no leg edema  Neurologic/Psych: mentating well       LABORATORY DATA:     Lab Results   Component Value Date     (H) 2024    BUN 26 (H) 2024    CREATSERUM 1.12 (H) 2024    ANIONGAP 6 2024    GFR 64 2017    GFRNAA 59 (L) 2011    GFRAA 71 2011    CA 8.7 2024    OSMOCALC 281 2024    ALKPHO 97 2024    AST 26 2024    ALT 39 2024    BILT 0.7 2024    TP 6.8 2024    ALB 4.6 2024    GLOBULIN 2.2 2024    AGRATIO 2.5 2015     (L) 2024    K 4.7 2024     2024    CO2 20.0 (L) 2024     Lab Results   Component Value Date    WBC 15.4 (H) 2024    RBC 2.52 (L) 2024    HGB 8.0 (L) 2024    HCT 24.5 (L) 2024    .0 2024    MPV 11.6 (H) 2011    MCV 97.2 2024    MCH 31.7 2024    MCHC 32.7 2024    RDW 15.7 2024     NEPRELIM 13.18 (H) 11/23/2024    NEPERCENT 85.6 11/23/2024    LYPERCENT 8.4 11/23/2024    MOPERCENT 4.9 11/23/2024    EOPERCENT 0.4 11/23/2024    BAPERCENT 0.1 11/23/2024    NE 13.18 (H) 11/23/2024    LYMABS 1.29 11/23/2024    MOABSO 0.76 11/23/2024    EOABSO 0.06 11/23/2024    BAABSO 0.01 11/23/2024     Lab Results   Component Value Date    CREUR 40.00 11/22/2024     Lab Results   Component Value Date    COLORUR Colorless (A) 11/19/2024    CLARITY Clear 11/19/2024    SPECGRAVITY 1.013 11/19/2024    GLUUR Normal 11/19/2024    BILUR Negative 11/19/2024    KETUR Negative 11/19/2024    BLOODURINE Negative 11/19/2024    PHURINE 7.0 11/19/2024    PROUR Negative 11/19/2024    UROBILINOGEN Normal 11/19/2024    NITRITE Negative 11/19/2024    LEUUR Negative 11/19/2024    NMIC Microscopic not indicated 11/19/2024    WBCUR 1-5 10/05/2024    RBCUR 0-2 10/05/2024    EPIUR Few (A) 10/05/2024    BACUR None Seen 10/05/2024         IMAGING:     Reviewed.    MEDICATIONS:       Current Facility-Administered Medications   Medication Dose Route Frequency    sodium chloride 0.9% infusion   Intravenous Continuous    benzocaine-menthol (Cepacol) lozenge 1 lozenge  1 lozenge Oral PRN    aspirin chewable tab 81 mg  81 mg Oral Daily    apixaban (Eliquis) tab 2.5 mg  2.5 mg Oral BID    sennosides (Senokot) tab 17.2 mg  17.2 mg Oral Nightly    docusate sodium (Colace) cap 100 mg  100 mg Oral BID    polyethylene glycol (PEG 3350) (Miralax) 17 g oral packet 17 g  17 g Oral Daily PRN    magnesium hydroxide (Milk of Magnesia) 400 MG/5ML oral suspension 30 mL  30 mL Oral Daily PRN    bisacodyl (Dulcolax) 10 MG rectal suppository 10 mg  10 mg Rectal Daily PRN    fleet enema (Fleet) rectal enema 133 mL  1 enema Rectal Once PRN    ondansetron (Zofran) 4 MG/2ML injection 4 mg  4 mg Intravenous Q6H PRN    metoclopramide (Reglan) 5 mg/mL injection 5 mg  5 mg Intravenous Q8H PRN    multivitamin (Tab-A-Nara/Beta Carotene) tab 1 tablet  1 tablet Oral  Daily    metoprolol succinate ER (Toprol XL) 24 hr tab 50 mg  50 mg Oral Daily    rosuvastatin (Crestor) tab 20 mg  20 mg Oral Nightly    acetaminophen (Tylenol Extra Strength) tab 500 mg  500 mg Oral Q4H PRN    acetaminophen (Tylenol) tab 650 mg  650 mg Oral Q4H PRN    Or    HYDROcodone-acetaminophen (Norco) 5-325 MG per tab 1 tablet  1 tablet Oral Q4H PRN    Or    HYDROcodone-acetaminophen (Norco) 5-325 MG per tab 2 tablet  2 tablet Oral Q4H PRN    morphINE PF 2 MG/ML injection 1 mg  1 mg Intravenous Q2H PRN    Or    morphINE PF 2 MG/ML injection 2 mg  2 mg Intravenous Q2H PRN    Or    morphINE PF 4 MG/ML injection 4 mg  4 mg Intravenous Q2H PRN    melatonin tab 3 mg  3 mg Oral Nightly PRN    lidocaine PF (Xylocaine-MPF) 1% injection  2 mL Injection PRN    ropivacaine (Naropin) 0.5% injection  30 mL Injection PRN    EPINEPHrine (Adrenalin) 1 MG/ML injection (Allergic Reaction Kit) 1 mg  1 mg Injection PRN    sodium chloride 0.9% PF injection 10 mL  10 mL Injection PRN    hydrALAzine (Apresoline) 20 mg/mL injection 10 mg  10 mg Intravenous Q4H PRN    predniSONE (Deltasone) tab 20 mg  20 mg Oral Daily with breakfast    Followed by    [START ON 11/30/2024] predniSONE (Deltasone) tab 10 mg  10 mg Oral Daily with breakfast    pantoprazole (Protonix) DR tab 20 mg  20 mg Oral Daily PRN       ASSESSMENT/PLAN:     77 yo F with history of metastatic lung ca s/p pembro c/b AIN on renal biopsy now on prednisone taper, HFpEF, AAA, HTN, HL, presented with mechanical fall with R femoral neck fracture s/p R MYAH. Nephrology consulted for PRINCESS.    PRINCESS:  -- stop IV fluids  -- continue prednisone taper as outlined by her nephrologist at [facility]  -- avoid NSAIDs  -- ok to hold lisinopril on discharge    Urinary retention:  -- follow up with urology as needed    Hyperkalemia:  -- lisionpril on hold    Hyponatremia:  -- in the setting of urinary retention, lung cancer; urine studies consistent with SIADH  -- fluid restriction < 48  oz/d    Acidosis:   -- resume home bicitra on discharge  -- stop saline    Anemia/leukocytosis:  -- per oncology/primary    Discussed with daughter on phone.  Discussed with RN.  Ok to discharge.  Follow up with outpatient nephrologist as scheduled.    Prolonged time >40 min with face to face and non-face time.          Thank you for allowing me to participate in the care of this patient. Please do not hesitate to call with questions or concerns.        Jazmin Lee MD  [facility]  [address] [place] [id number]    11/23/2024  2:26 PM

## 2024-11-25 NOTE — CDS QUERY
Dear Dr Womack :  Can you please further clarify the diagnosis of anemia:  (  x ) Anemia due to fracture and postoperative blood loss   (  )  Other Anemia:    Documentation from the medical record:  Clinical Indicators:   ___11/19 Hemoglobin/HCT on admission: 12.9/38.5  ___11/20 Hemoglobin/HCT 11/32.2  ___11/21 Hemoglobin/HCT 10.4/31.4  ___11/21 Nursing Note: 500mL 0.9 bolus given for hypotension per MD, recheck 102/36-told to monitor.  ___11/22 Hemoglobin/HCT 8.4/25.3  ___11/22 Nephrology Consult: Elevated creatinine likely pre-renal in etiology in setting of volume contraction, hypotension s/p right hip hemiarthroplasty/bipolar for displaced right femoral neck fracture  ___11/23 Hemoglobin/HCT 8.0/24.5  ___Hospitalist' Progress Note: Post Operative Anemia -no active bleeding at this time -no transfusion needed  Potential Risk: Right hip hemiarthroplasty, Right femoral neck fracture  Treatment: Repeat Labs.                   Use of terms such as suspected, possible, or probable (associated with a specific diagnosis that is being evaluated, monitored, or treated as if it exists) are acceptable and can be coded in the inpatient setting, when documented at the time of discharge.     Please add any additional documentation to your progress note and continue to document this through discharge.      For questions regarding this query, please contact Clinical Documentation:  Patty FAROOQ RN, CCDS  [phone] [email or web address]. Thank you.  THIS FORM IS A PERMANENT PART OF THE MEDICAL RECORD

## 2024-11-30 NOTE — DISCHARGE SUMMARY
[facility]   part of [facility]    DISCHARGE SUMMARY     Mojgan Jefferson Patient Status:  Inpatient    1945 MRN [id number]   Location [facility] 3SW-A Attending No att. providers found   Hosp Day # 4 PCP Guerrero Naik MD     Date of Admission: 2024  Date of Discharge: 2024  Discharge Disposition: Home or Self Care    Discharge Diagnosis: Right femoral neck fracture, postoperative anemia, PRINCESS    History of Present Illness: 78 year old female with history of AAA, ??COPD, Diastolic dysfunction, gerd, PAD, HTN, HLD, hx PFO, lung cancer on immunotherapy presenting with a mechanical fall and right leg pain.  Patient was found to have right femoral neck fracture.  Orthopedic surgery was consulted.  Patient underwent right hip hemiarthroplasty plasty/bipolar.  Patient was seen by PT OT and was cleared for discharge home with home health.  Patient did have postoperative anemia.  Patient denied any need of a transfusion.  Patient also had mild PRINCESS.  Patient was given IV fluids after nephrology was consulted.  Patient's creatinine did improve.  Patient had still had leukocytosis on admission.  Likely leukemoid reaction fracture and prednisone use recently.  Patient was seen by cardiology while hospitalized due to his underlying elevated blood pressure.  Patient's medications were adjusted.  Patient blood pressure did improve.  Patient was cleared by all services for discharge.  Patient clinically stable, vital stable, labs stable for discharge.  Patient agreeable discharge.    Discharge Medication List:     Discharge Medications        START taking these medications        Instructions Prescription details   HYDROcodone-acetaminophen 5-325 MG Tabs  Commonly known as: Norco      Take 1 tablet by mouth every 6 (six) hours as needed.   Quantity: 12 tablet  Refills: 0            CHANGE how you take these medications        Instructions Prescription details   apixaban 2.5 MG Tabs  Commonly  known as: Eliquis  What changed:   medication strength  how much to take  how to take this  when to take this  additional instructions      Take 1 tablet (2.5 mg total) by mouth 2 (two) times daily.   Quantity: 60 tablet  Refills: 0     omeprazole 20 MG Cpdr  Commonly known as: PriLOSEC  What changed:   when to take this  reasons to take this      TAKE 1 CAPSULE(20 MG) BY MOUTH DAILY   Quantity: 90 capsule  Refills: 3     predniSONE 10 MG Tabs  Commonly known as: Deltasone  Start taking on: November 24, 2024  What changed:   medication strength  See the new instructions.      Take 2 tablets (20 mg total) by mouth daily with breakfast, THEN 1 tablet (10 mg total) daily with breakfast.   Refills: 0            CONTINUE taking these medications        Instructions Prescription details   aspirin 81 MG Tabs      Take 1 tablet (81 mg total) by mouth daily.   Refills: 0     metoprolol succinate ER 50 MG Tb24  Commonly known as: Toprol XL      Take 1 tablet (50 mg total) by mouth daily.   Quantity: 90 tablet  Refills: 3     omega-3 fatty acids 1000 MG Caps  Commonly known as: Fish Oil      Take 1,000 mg by mouth daily.   Refills: 0     rosuvastatin 20 MG Tabs  Commonly known as: Crestor      Take 1 tablet (20 mg total) by mouth nightly.   Refills: 0            STOP taking these medications      albuterol 108 (90 Base) MCG/ACT Aers  Commonly known as: Ventolin HFA        lisinopril 20 MG Tabs  Commonly known as: Prinivil; Zestril                  Where to Get Your Medications        These medications were sent to [id number] - [place], [place] - [address] AT [facility], [phone], [phone]  [address], [facility] [address]      Phone: [phone]   apixaban 2.5 MG Tabs       Please  your prescriptions at the location directed by your doctor or nurse    Bring a paper prescription for each of these medications  HYDROcodone-acetaminophen 5-325 MG Tabs        Follow-up appointment:   Andrew Anand MD  [address]   [address]  [facility] [address]  [phone]    Follow up in 2 week(s)      Guerrero Naik MD  [address] [facility] [id number] [phone]    Follow up      Tristian Rey MD  [address]   [address]  [facility] [address]  [phone]    Schedule an appointment as soon as possible for a visit in 2 week(s)      Jazmin Lee MD  [address] [facility]  [address]  [place] [address]  [phone]    Schedule an appointment as soon as possible for a visit in 2 week(s)      Appointments for Next 30 Days 11/30/2024 - 12/30/2024      None            OBJECTIVE:  Temp:  [97.5 °F (36.4 °C)-98.1 °F (36.7 °C)] 97.5 °F (36.4 °C)  Pulse:  [62-87] 62  Resp:  [16] 16  BP: ()/(40-55) 151/55  SpO2:  [96 %-100 %] 96 %  Exam  Gen: No acute distress, alert and oriented  Pulm: Lungs clear bilaterally, normal respiratory effort, no crackles, no wheezing  CV: Heart with regular rate and rhythm, no murmur.  Abd: Abdomen soft, nontender, nondistended, bowel sounds present  MSK: No significant pitting edema or tenderness of the LE  Skin: no new rashes or lesions  -----------------------------------------------------------------------------------------------  PATIENT DISCHARGE INSTRUCTIONS: See electronic chart    ASSESSMENT / PLAN:   78 year old female with history of AAA, ??COPD, Diastolic dysfunction, gerd, PAD, HTN, HLD, hx PFO, lung cancer on immunotherapy presenting with a mechanical fall and right leg pain.      Right femoral neck fracture  -ortho consulted--> OR --> POD # 3 s/p right hip hemiarthroplasty/bipolar  -prn iv morphine   -prn po norco  -dc pain meds per ortho     Post Operative Anemia  -no active bleeding at this time  -no transfusion needed     HTN  -sbp elevated--> improved  -continue metoprolol  -continue lisinopril --> stoped  -iv hydralazine prn for sbp< 160  -cardiology following--> ok for dc     HLD  -rosuvastatin     PAD  -asa on  hold--> resume  -statin     Hx PE  -eliquis on hold due to fx and surgery in near future--> resumed      GERD  -pantoprazole     Recent immunotherapy induced kidney injury  -continue prednisone if ok with ortho--> ok to continue     Leukocytosis  -no obvious infection  -likely leukemoid reaction to fracture and prednisone use.      PRINCESS--> improving  -if worse consult renal for evaluation and tx--> Renal consulted-->Continue ivf--> creat improved, ok for dc per sonido, hold ace on dc     Plan of care discussed with patient, daughter and staff     Dispo:  discharge      John Womack MD  [facility]  [phone]    Time spent:  > 35 minutes

## 2025-01-22 ENCOUNTER — OFFICE VISIT (OUTPATIENT)
Dept: CARDIOLOGY | Age: 80
End: 2025-01-22

## 2025-01-22 VITALS
BODY MASS INDEX: 24.45 KG/M2 | OXYGEN SATURATION: 96 % | HEIGHT: 61 IN | WEIGHT: 129.52 LBS | DIASTOLIC BLOOD PRESSURE: 74 MMHG | HEART RATE: 74 BPM | SYSTOLIC BLOOD PRESSURE: 161 MMHG

## 2025-01-22 DIAGNOSIS — I10 ESSENTIAL HYPERTENSION: ICD-10-CM

## 2025-01-22 DIAGNOSIS — I50.32 CHRONIC CONGESTIVE HEART FAILURE WITH LEFT VENTRICULAR DIASTOLIC DYSFUNCTION  (CMD): Primary | ICD-10-CM

## 2025-01-22 DIAGNOSIS — Z86.711 HISTORY OF PULMONARY EMBOLISM: ICD-10-CM

## 2025-01-22 DIAGNOSIS — E78.2 HYPERLIPIDEMIA, MIXED: ICD-10-CM

## 2025-01-22 DIAGNOSIS — Z79.01 CURRENT USE OF LONG TERM ANTICOAGULATION: ICD-10-CM

## 2025-01-22 RX ORDER — ROSUVASTATIN CALCIUM 10 MG/1
10 TABLET, COATED ORAL DAILY
Qty: 90 TABLET | Refills: 1 | Status: SHIPPED | OUTPATIENT
Start: 2025-01-22

## 2025-01-22 RX ORDER — ROSUVASTATIN CALCIUM 10 MG/1
10 TABLET, COATED ORAL DAILY
COMMUNITY
End: 2025-01-22 | Stop reason: SDUPTHER

## 2025-01-28 ENCOUNTER — APPOINTMENT (OUTPATIENT)
Dept: CARDIOLOGY | Age: 80
End: 2025-01-28

## 2025-01-28 ENCOUNTER — LAB SERVICES (OUTPATIENT)
Dept: LAB | Age: 80
End: 2025-01-28

## 2025-01-28 VITALS
BODY MASS INDEX: 22.87 KG/M2 | HEART RATE: 78 BPM | DIASTOLIC BLOOD PRESSURE: 57 MMHG | SYSTOLIC BLOOD PRESSURE: 110 MMHG | HEIGHT: 61 IN | WEIGHT: 121.14 LBS | OXYGEN SATURATION: 95 %

## 2025-01-28 DIAGNOSIS — I10 ESSENTIAL HYPERTENSION: ICD-10-CM

## 2025-01-28 DIAGNOSIS — I25.10 CAD IN NATIVE ARTERY: ICD-10-CM

## 2025-01-28 DIAGNOSIS — Z86.711 HISTORY OF PULMONARY EMBOLISM: ICD-10-CM

## 2025-01-28 DIAGNOSIS — I73.9 PAD (PERIPHERAL ARTERY DISEASE) (CMD): ICD-10-CM

## 2025-01-28 DIAGNOSIS — I50.32 CHRONIC CONGESTIVE HEART FAILURE WITH LEFT VENTRICULAR DIASTOLIC DYSFUNCTION  (CMD): ICD-10-CM

## 2025-01-28 DIAGNOSIS — E78.2 HYPERLIPIDEMIA, MIXED: ICD-10-CM

## 2025-01-28 DIAGNOSIS — Z79.01 CURRENT USE OF LONG TERM ANTICOAGULATION: ICD-10-CM

## 2025-01-28 DIAGNOSIS — I50.32 CHRONIC CONGESTIVE HEART FAILURE WITH LEFT VENTRICULAR DIASTOLIC DYSFUNCTION  (CMD): Primary | ICD-10-CM

## 2025-01-28 LAB
ANION GAP SERPL CALC-SCNC: 11 MMOL/L (ref 7–19)
BUN SERPL-MCNC: 14 MG/DL (ref 6–20)
BUN/CREAT SERPL: 15 (ref 7–25)
CALCIUM SERPL-MCNC: 9.1 MG/DL (ref 8.4–10.2)
CHLORIDE SERPL-SCNC: 107 MMOL/L (ref 97–110)
CO2 SERPL-SCNC: 21 MMOL/L (ref 21–32)
CREAT SERPL-MCNC: 0.93 MG/DL (ref 0.51–0.95)
EGFRCR SERPLBLD CKD-EPI 2021: 62 ML/MIN/{1.73_M2}
FASTING DURATION TIME PATIENT: 0 HOURS (ref 0–999)
GLUCOSE SERPL-MCNC: 102 MG/DL (ref 70–99)
POTASSIUM SERPL-SCNC: 3.4 MMOL/L (ref 3.4–5.1)
SODIUM SERPL-SCNC: 136 MMOL/L (ref 135–145)

## 2025-01-28 PROCEDURE — 80048 BASIC METABOLIC PNL TOTAL CA: CPT | Performed by: CLINICAL MEDICAL LABORATORY

## 2025-01-28 PROCEDURE — 36415 COLL VENOUS BLD VENIPUNCTURE: CPT | Performed by: CLINICAL MEDICAL LABORATORY

## 2025-01-28 RX ORDER — FUROSEMIDE 20 MG/1
20 TABLET ORAL DAILY
Qty: 90 TABLET | Refills: 0 | Status: SHIPPED | OUTPATIENT
Start: 2025-01-28

## 2025-01-28 SDOH — HEALTH STABILITY: PHYSICAL HEALTH: ON AVERAGE, HOW MANY DAYS PER WEEK DO YOU ENGAGE IN MODERATE TO STRENUOUS EXERCISE (LIKE A BRISK WALK)?: 0 DAYS

## 2025-01-28 SDOH — HEALTH STABILITY: PHYSICAL HEALTH: ON AVERAGE, HOW MANY MINUTES DO YOU ENGAGE IN EXERCISE AT THIS LEVEL?: 0 MIN

## 2025-01-28 SDOH — HEALTH STABILITY: PHYSICAL HEALTH: ON AVERAGE, HOW MANY DAYS PER WEEK DO YOU ENGAGE IN MODERATE TO STRENUOUS EXERCISE (LIKE A BRISK WALK)?: 2 DAYS

## 2025-01-28 SDOH — HEALTH STABILITY: PHYSICAL HEALTH: ON AVERAGE, HOW MANY MINUTES DO YOU ENGAGE IN EXERCISE AT THIS LEVEL?: 60 MIN

## 2025-01-28 ASSESSMENT — PATIENT HEALTH QUESTIONNAIRE - PHQ9
CLINICAL INTERPRETATION OF PHQ2 SCORE: NO FURTHER SCREENING NEEDED
1. LITTLE INTEREST OR PLEASURE IN DOING THINGS: NOT AT ALL
SUM OF ALL RESPONSES TO PHQ9 QUESTIONS 1 AND 2: 0
SUM OF ALL RESPONSES TO PHQ9 QUESTIONS 1 AND 2: 0
2. FEELING DOWN, DEPRESSED OR HOPELESS: NOT AT ALL

## 2025-01-29 ENCOUNTER — TELEPHONE (OUTPATIENT)
Dept: CARDIOLOGY | Age: 80
End: 2025-01-29

## 2025-01-29 DIAGNOSIS — I50.32 CHRONIC CONGESTIVE HEART FAILURE WITH LEFT VENTRICULAR DIASTOLIC DYSFUNCTION  (CMD): ICD-10-CM

## 2025-01-29 DIAGNOSIS — E87.6 HYPOKALEMIA: Primary | ICD-10-CM

## 2025-01-29 DIAGNOSIS — I10 ESSENTIAL HYPERTENSION: ICD-10-CM

## 2025-01-29 RX ORDER — POTASSIUM CHLORIDE 750 MG/1
10 TABLET, EXTENDED RELEASE ORAL DAILY
Qty: 90 TABLET | Refills: 1 | Status: SHIPPED | OUTPATIENT
Start: 2025-01-29

## 2025-01-29 RX ORDER — POTASSIUM CHLORIDE 750 MG/1
10 CAPSULE, EXTENDED RELEASE ORAL DAILY
COMMUNITY
End: 2025-01-29 | Stop reason: ALTCHOICE

## 2025-01-29 RX ORDER — POTASSIUM CHLORIDE 750 MG/1
10 TABLET, EXTENDED RELEASE ORAL DAILY
COMMUNITY
End: 2025-01-29 | Stop reason: SDUPTHER

## 2025-03-10 ENCOUNTER — APPOINTMENT (OUTPATIENT)
Dept: CARDIOLOGY | Age: 80
End: 2025-03-10

## 2025-03-24 ENCOUNTER — APPOINTMENT (OUTPATIENT)
Dept: CARDIOLOGY | Age: 80
End: 2025-03-24

## 2025-03-24 VITALS
WEIGHT: 120.59 LBS | SYSTOLIC BLOOD PRESSURE: 110 MMHG | OXYGEN SATURATION: 95 % | BODY MASS INDEX: 22.77 KG/M2 | DIASTOLIC BLOOD PRESSURE: 55 MMHG | HEIGHT: 61 IN | HEART RATE: 72 BPM

## 2025-03-24 DIAGNOSIS — Z79.01 CURRENT USE OF LONG TERM ANTICOAGULATION: ICD-10-CM

## 2025-03-24 DIAGNOSIS — I10 ESSENTIAL HYPERTENSION: ICD-10-CM

## 2025-03-24 DIAGNOSIS — Z86.711 HISTORY OF PULMONARY EMBOLISM: ICD-10-CM

## 2025-03-24 DIAGNOSIS — E78.2 HYPERLIPIDEMIA, MIXED: ICD-10-CM

## 2025-03-24 DIAGNOSIS — I50.32 CHRONIC CONGESTIVE HEART FAILURE WITH LEFT VENTRICULAR DIASTOLIC DYSFUNCTION  (CMD): Primary | ICD-10-CM

## 2025-03-24 DIAGNOSIS — I73.9 PAD (PERIPHERAL ARTERY DISEASE) (CMD): ICD-10-CM

## 2025-03-24 RX ORDER — HYDROXYZINE HYDROCHLORIDE 25 MG/1
1 TABLET, FILM COATED ORAL NIGHTLY
COMMUNITY
Start: 2025-03-14

## 2025-04-03 ENCOUNTER — HOSPITAL ENCOUNTER (EMERGENCY)
Facility: HOSPITAL | Age: 80
Discharge: HOME | End: 2025-04-03
Attending: STUDENT IN AN ORGANIZED HEALTH CARE EDUCATION/TRAINING PROGRAM
Payer: MEDICARE

## 2025-04-03 VITALS
SYSTOLIC BLOOD PRESSURE: 139 MMHG | DIASTOLIC BLOOD PRESSURE: 50 MMHG | HEART RATE: 74 BPM | WEIGHT: 120 LBS | HEIGHT: 61 IN | OXYGEN SATURATION: 100 % | TEMPERATURE: 98 F | RESPIRATION RATE: 20 BRPM | BODY MASS INDEX: 22.66 KG/M2

## 2025-04-03 DIAGNOSIS — E87.6 HYPOKALEMIA: Primary | ICD-10-CM

## 2025-04-03 LAB
ANION GAP SERPL CALC-SCNC: 12 MMOL/L (ref 0–18)
BUN BLD-MCNC: 14 MG/DL (ref 9–23)
CALCIUM BLD-MCNC: 10.2 MG/DL (ref 8.7–10.6)
CHLORIDE SERPL-SCNC: 106 MMOL/L (ref 98–112)
CO2 SERPL-SCNC: 18 MMOL/L (ref 21–32)
CREAT BLD-MCNC: 1.37 MG/DL
EGFRCR SERPLBLD CKD-EPI 2021: 39 ML/MIN/1.73M2 (ref 60–?)
GLUCOSE BLD-MCNC: 106 MG/DL (ref 70–99)
MAGNESIUM SERPL-MCNC: 2.3 MG/DL (ref 1.6–2.6)
OSMOLALITY SERPL CALC.SUM OF ELEC: 283 MOSM/KG (ref 275–295)
POTASSIUM SERPL-SCNC: 2.6 MMOL/L (ref 3.5–5.1)
SODIUM SERPL-SCNC: 136 MMOL/L (ref 136–145)

## 2025-04-03 PROCEDURE — 99284 EMERGENCY DEPT VISIT MOD MDM: CPT

## 2025-04-03 PROCEDURE — 96365 THER/PROPH/DIAG IV INF INIT: CPT

## 2025-04-03 PROCEDURE — 93010 ELECTROCARDIOGRAM REPORT: CPT

## 2025-04-03 PROCEDURE — 80048 BASIC METABOLIC PNL TOTAL CA: CPT | Performed by: STUDENT IN AN ORGANIZED HEALTH CARE EDUCATION/TRAINING PROGRAM

## 2025-04-03 PROCEDURE — 83735 ASSAY OF MAGNESIUM: CPT | Performed by: STUDENT IN AN ORGANIZED HEALTH CARE EDUCATION/TRAINING PROGRAM

## 2025-04-03 PROCEDURE — 93005 ELECTROCARDIOGRAM TRACING: CPT

## 2025-04-03 PROCEDURE — 96366 THER/PROPH/DIAG IV INF ADDON: CPT

## 2025-04-03 RX ORDER — POTASSIUM CHLORIDE 1500 MG/1
40 TABLET, EXTENDED RELEASE ORAL ONCE
Status: COMPLETED | OUTPATIENT
Start: 2025-04-03 | End: 2025-04-03

## 2025-04-03 RX ORDER — POTASSIUM CHLORIDE 14.9 MG/ML
20 INJECTION INTRAVENOUS ONCE
Status: COMPLETED | OUTPATIENT
Start: 2025-04-03 | End: 2025-04-03

## 2025-04-03 RX ORDER — POTASSIUM CHLORIDE 1500 MG/1
40 TABLET, EXTENDED RELEASE ORAL DAILY
Qty: 10 TABLET | Refills: 0 | Status: SHIPPED | OUTPATIENT
Start: 2025-04-03 | End: 2025-04-08

## 2025-04-03 NOTE — ED INITIAL ASSESSMENT (HPI)
GENERALIZED BODY WEAKNESS AND FATIGUE  SINCE FEW DAYS        SEEN BY HER PRIMARY TODAY BLOOD TEST DONE    SHOWS POTASSIUM   IS 2.5 . HER DOCTOR ADVISED TO  GO TO ER  . PATIENT IS AMBULATORY WITH STEADY GAIT.

## 2025-04-03 NOTE — ED PROVIDER NOTES
History     Chief Complaint   Patient presents with    Abnormal Labs       HPI    79 year old female advised to come to the hospital by physician for low potassium on outpatient labs.  Patient has a history of chronic kidney disease, hypertension, metastatic adenocarcinoma not currently on therapy.  For the past couple days she has had some loose stools and poor appetite.  No fevers or abdominal pain.  No vomiting.  She was previously on oral potassium but states she only takes about half tablet a day.          Past Medical History:    AAA (abdominal aortic aneurysm)    3x3.4 cm, discussed with pt 9/3/20, recheck yearly, quit tob in 2011, treat risk factors.     Abnormal echocardiogram    possible pulm htn, went to [place] for eval 12/19/17 and w/u was negative, note received 8/29/18    Abnormal stress echo    saw [facility] 7/15/21, 12/13/21    Agatston coronary artery calcium score less than 100    74 at [place], sees Dr. Bellamy, last visit 7/15/21    Atherosclerosis of abdominal aorta    at [facility]    Cancer ([facility])    COPD (chronic obstructive pulmonary disease) ([facility])    on cardiopulmonary stress test Dr. Bellamy    Diastolic dysfunction    going to [place], started on furosemide    Eczema    saw derm 10/13    Elevated liver enzymes    presumed to be due to lipitor    Encounter for eye exam    [facility], overdue 9/20    Esophageal reflux    meds prn    Ex-cigarette smoker    Heart failure with preserved ejection fraction (HCC)    dx at [place], lasix added    High blood pressure    High cholesterol    Dr. Bellamy managing    History of cardiovascular stress test    obstructive pulmonary component on cardiopulmonary stress test at [facility] ordered by Dr. Bellamy     History of Doppler echocardiogram    at [facility], Dr. Bellamy    History of normal resting EKG    at [facility]    History of nuclear stress test    neg at [place], EF 57%    Hypercalcemia    Iliac artery occlusion, right ([facility])    Dr. Cantu. had lithotripsy and stent, last US 8/4/21 at  [facility]: aortic atherosclerosis, patent R iliac stent    Left atrial enlargement    Dr. Sonja Roe at [facility], EF 77%    Living will in place    Osteopenia    improved , further improvement , d/c med after 5 years, recheck     PFO (patent foramen ovale) (HCC)    dx at [place], felt not to be clinically signicant. no tx needed    Pulmonary nodule, left    on Ct at [place], also with mildly enlarged nodes    PVD (peripheral vascular disease)    saw Dr. Cantu, stent R iliac on 10/6/20, claudication on R side. , f/u 21    Refused influenza vaccine    she's considering for     Screening for cardiovascular condition    normal CGXT with EF of 70%, see below for abn stress echo    Sinus bradycardia    with LAE       Past Surgical History:   Procedure Laterality Date    Colonoscopy N/A 2015    Procedure: COLONOSCOPY;  Surgeon: Garrett Kim MD;  Location:  ENDOSCOPY          x3 with anesthesia    Stent place iliac art o/p ini  10/06/2020    RAntolin Cantu       Social History     Socioeconomic History    Marital status:     Number of children: 3   Occupational History    Occupation: retired    Tobacco Use    Smoking status: Former     Current packs/day: 0.00     Average packs/day: 0.3 packs/day for 40.0 years (12.0 ttl pk-yrs)     Types: Cigarettes     Start date: 1971     Quit date: 2011     Years since quittin.7    Smokeless tobacco: Never   Vaping Use    Vaping status: Never Used   Substance and Sexual Activity    Alcohol use: Yes     Alcohol/week: 0.0 - 7.0 standard drinks of alcohol     Comment: 1 glass of wine daily    Drug use: No    Sexual activity: Not Currently     Partners: Male   Other Topics Concern     Service No    Blood Transfusions No    Caffeine Concern No    Occupational Exposure No    Hobby Hazards No    Sleep Concern Yes     Comment: trouble sleeping, poss due to anxiety    Stress Concern Yes     Comment: occassionally    Weight Concern No    Special  Diet No    Back Care No    Exercise Yes     Comment: very active at home, goes to fitness center, walking    Seat Belt Yes    Self-Exams Yes     Social Drivers of Health     Food Insecurity: No Food Insecurity (11/19/2024)    Food Insecurity     Food Insecurity: Never true   Transportation Needs: No Transportation Needs (11/19/2024)    Transportation Needs     Lack of Transportation: No   Housing Stability: Low Risk  (11/19/2024)    Housing Stability     Housing Instability: No                   Physical Exam     ED Triage Vitals [04/03/25 1659]   /61   Pulse 69   Resp 18   Temp 97.7 °F (36.5 °C)   Temp src Oral   SpO2 95 %   O2 Device None (Room air)       Physical Exam  Constitutional:       General: She is not in acute distress.  Eyes:      Extraocular Movements: Extraocular movements intact.   Cardiovascular:      Rate and Rhythm: Normal rate.      Pulses: Normal pulses.   Pulmonary:      Effort: Pulmonary effort is normal. No respiratory distress.   Abdominal:      General: Abdomen is flat. There is no distension.      Tenderness: There is no abdominal tenderness. There is no guarding.   Musculoskeletal:         General: No swelling or deformity.      Cervical back: Normal range of motion.   Skin:     General: Skin is warm.   Neurological:      General: No focal deficit present.      Mental Status: She is alert.              ED Course     Labs Reviewed   BASIC METABOLIC PANEL (8) - Abnormal; Notable for the following components:       Result Value    Glucose 106 (*)     Potassium 2.6 (*)     CO2 18.0 (*)     Creatinine 1.37 (*)     eGFR-Cr 39 (*)     All other components within normal limits   MAGNESIUM - Normal     No results found.        MDM     Vitals:    04/03/25 1659 04/03/25 1815 04/03/25 1915 04/03/25 2000   BP: 120/61 139/50     Pulse: 69 64 74 74   Resp: 18 21 20 20   Temp: 97.7 °F (36.5 °C)      TempSrc: Oral      SpO2: 95% 100% 100% 100%   Weight: 54.4 kg      Height: 154.9 cm (5' 1\")           I reviewed patient's outpatient labs showing hypokalemia, reassuring renal function, metabolic acidosis which appears to be baseline for patient.  She is likely a bit dehydrated from diarrhea and poor appetite.  Plan repletion and will check magnesium.    ED Course as of 04/03/25 2147  ------------------------------------------------------------  Time: 04/03 1755  Comment: Per chart review patient had Lasix recently stopped by nephrology due to renal function lower blood pressure.  ------------------------------------------------------------  Time: 04/03 1755  Comment: Patient appears to be already on potassium chloride at home  ------------------------------------------------------------  Time: 04/03 1756  Comment: EKG interpretation by me: EKG sinus rhythm at a rate of 70, axis nomal, no concerning acute ischemic ST changes, lvh appears similar to prior EKGs    ------------------------------------------------------------  Time: 04/03 1911  Comment: Patient repleted with IV and oral potassium.  Renal function is reassuring at this time, patient was given fluids.  Magnesium within normal notes  ------------------------------------------------------------  Time: 04/03 2004  Comment: Patient will be discharged with higher dose potassium for the next 5 days, advised electrolyte containing fluids and PCP follow-up, will need labs repeated in the next few days to ensure improvement.  Return precautions.         Disposition and Plan     Clinical Impression:  1. Hypokalemia        Disposition:  Discharge    Follow-up:  Rosalba Lazo  [address] [facility]  [place] [address]  [phone]    Schedule an appointment as soon as possible for a visit        Medications Prescribed:  Discharge Medication List as of 4/3/2025  8:37 PM        START taking these medications    Details   potassium chloride 20 MEQ Oral Tab CR Take 2 tablets (40 mEq total) by mouth daily for 5 days., Normal, Disp-10 tablet, R-0

## 2025-04-04 LAB
ATRIAL RATE: 70 BPM
P AXIS: 47 DEGREES
P-R INTERVAL: 210 MS
Q-T INTERVAL: 418 MS
QRS DURATION: 100 MS
QTC CALCULATION (BEZET): 451 MS
R AXIS: 19 DEGREES
T AXIS: 43 DEGREES
VENTRICULAR RATE: 70 BPM

## 2025-04-04 NOTE — ED QUICK NOTES
Patient appears in NAD, denies any complaints at this time, RR even/NL    Friend at bedside, updated on POC, waiting for K+ infusion to complete then DC, call light within reach, bed in low and locked position, on continuous cardiac monitoring, wctm closely.

## 2025-05-16 ENCOUNTER — APPOINTMENT (OUTPATIENT)
Dept: CARDIOLOGY | Age: 80
End: 2025-05-16
Attending: REGISTERED NURSE

## 2025-05-29 ENCOUNTER — APPOINTMENT (OUTPATIENT)
Dept: CARDIOLOGY | Age: 80
End: 2025-05-29
Attending: REGISTERED NURSE

## 2025-05-29 DIAGNOSIS — I50.32 CHRONIC CONGESTIVE HEART FAILURE WITH LEFT VENTRICULAR DIASTOLIC DYSFUNCTION  (CMD): ICD-10-CM

## 2025-05-29 LAB
AORTIC VALVE AREA (AVA): 0.87
ASCENDING AORTA (AAD): 3
AV PEAK GRADIENT (AVPG): 7
AV STENOSIS SEVERITY TEXT: NORMAL
AV VMAX SYS DOP: 1.3
AVI LVOT PEAK GRADIENT (LVOTMG): 0.9
E WAVE DECELARATION TIME (MDT): 15.55
INTERVENTRICULAR SEPTUM IN END DIASTOLE (IVSD): 2.07
LEFT INTERNAL DIMENSION IN SYSTOLE (LVSD): 1
LEFT VENTRICULAR INTERNAL DIMENSION IN DIASTOLE (LVDD): 2.5
LEFT VENTRICULAR POSTERIOR WALL IN END DIASTOLE (LVPW): 3.8
LV EF 2D ECHO EST: NORMAL %
LVOT 2D (LVOTD): 22.4
MV E TISSUE VEL LAT (MELV): 1.47
MV E TISSUE VEL MED (MESV): 10.9
MV E WAVE VEL/E TISSUE VEL MED(MSR): 5.59
MV PEAK A VELOCITY (MVPAV): 139
MV PEAK E VELOCITY (MVPEV): 1.22
RV END SYSTOLIC LONGITUDINAL STRAIN FREE WALL (RVGS): 1.8
TRICUSPID VALVE ANNULAR PEAK VELOCITY (TVAPV): 25
TRICUSPID VALVE PEAK REGURGITATION VELOCITY (TRPV): 3.2
TV ESTIMATED RIGHT ARTERIAL PRESSURE (RAP): 13.5

## 2025-05-29 PROCEDURE — 76376 3D RENDER W/INTRP POSTPROCES: CPT | Performed by: INTERNAL MEDICINE

## 2025-05-29 PROCEDURE — 93306 TTE W/DOPPLER COMPLETE: CPT | Performed by: INTERNAL MEDICINE

## 2025-05-29 PROCEDURE — 93356 MYOCRD STRAIN IMG SPCKL TRCK: CPT | Performed by: INTERNAL MEDICINE

## 2025-06-02 ENCOUNTER — RESULTS FOLLOW-UP (OUTPATIENT)
Dept: CARDIOLOGY | Age: 80
End: 2025-06-02

## 2025-07-29 ENCOUNTER — APPOINTMENT (OUTPATIENT)
Dept: CARDIOLOGY | Age: 80
End: 2025-07-29

## 2025-07-29 VITALS
HEART RATE: 57 BPM | BODY MASS INDEX: 23.54 KG/M2 | DIASTOLIC BLOOD PRESSURE: 70 MMHG | WEIGHT: 124.67 LBS | SYSTOLIC BLOOD PRESSURE: 133 MMHG | OXYGEN SATURATION: 99 % | HEIGHT: 61 IN

## 2025-07-29 DIAGNOSIS — Z86.711 HISTORY OF PULMONARY EMBOLISM: ICD-10-CM

## 2025-07-29 DIAGNOSIS — Z79.01 CURRENT USE OF LONG TERM ANTICOAGULATION: ICD-10-CM

## 2025-07-29 DIAGNOSIS — E78.2 HYPERLIPIDEMIA, MIXED: Primary | ICD-10-CM

## 2025-07-29 DIAGNOSIS — I10 ESSENTIAL HYPERTENSION: ICD-10-CM

## 2025-07-29 DIAGNOSIS — E55.9 HYPOVITAMINOSIS D: ICD-10-CM

## 2025-07-29 DIAGNOSIS — I50.32 CHRONIC CONGESTIVE HEART FAILURE WITH LEFT VENTRICULAR DIASTOLIC DYSFUNCTION  (CMD): ICD-10-CM

## 2025-07-29 SDOH — HEALTH STABILITY: PHYSICAL HEALTH: ON AVERAGE, HOW MANY DAYS PER WEEK DO YOU ENGAGE IN MODERATE TO STRENUOUS EXERCISE (LIKE A BRISK WALK)?: 3 DAYS

## 2025-07-29 SDOH — HEALTH STABILITY: PHYSICAL HEALTH: ON AVERAGE, HOW MANY MINUTES DO YOU ENGAGE IN EXERCISE AT THIS LEVEL?: 20 MIN

## 2025-07-29 ASSESSMENT — PATIENT HEALTH QUESTIONNAIRE - PHQ9
SUM OF ALL RESPONSES TO PHQ9 QUESTIONS 1 AND 2: 0
2. FEELING DOWN, DEPRESSED OR HOPELESS: NOT AT ALL
CLINICAL INTERPRETATION OF PHQ2 SCORE: NO FURTHER SCREENING NEEDED
SUM OF ALL RESPONSES TO PHQ9 QUESTIONS 1 AND 2: 0
1. LITTLE INTEREST OR PLEASURE IN DOING THINGS: NOT AT ALL

## 2025-08-12 RX ORDER — POTASSIUM CHLORIDE 750 MG/1
10 TABLET, EXTENDED RELEASE ORAL DAILY
Qty: 90 TABLET | Refills: 1 | Status: SHIPPED | OUTPATIENT
Start: 2025-08-12

## 2025-10-15 ENCOUNTER — APPOINTMENT (OUTPATIENT)
Dept: CARDIOLOGY | Age: 80
End: 2025-10-15
Attending: INTERNAL MEDICINE

## 2026-04-29 ENCOUNTER — APPOINTMENT (OUTPATIENT)
Dept: CARDIOLOGY | Age: 81
End: 2026-04-29
Attending: INTERNAL MEDICINE

## (undated) DEVICE — Device

## (undated) DEVICE — FEMORAL CANAL BRUSH, IRRIGATION/SUCTION

## (undated) DEVICE — 3M™ STERI-STRIP™ REINFORCED ADHESIVE SKIN CLOSURES, R1547, 1/2 IN X 4 IN (12 MM X 100 MM), 6 STRIPS/ENVELOPE: Brand: 3M™ STERI-STRIP™

## (undated) DEVICE — HOOD, PEEL-AWAY: Brand: FLYTE

## (undated) DEVICE — SUT VCRL + 1 27IN ABSRB UD OS-6 L36MM

## (undated) DEVICE — FULL DOSE BONE CEMENT, 10 PACK CATALOG NUMBER IS 6191-1-010
Type: IMPLANTABLE DEVICE | Site: HIP | Status: NON-FUNCTIONAL
Brand: SIMPLEX

## (undated) DEVICE — DECANTER BAG 9": Brand: MEDLINE INDUSTRIES, INC.

## (undated) DEVICE — COVER,LIGHT,CAMERA,HARD,1/PK,STRL: Brand: MEDLINE

## (undated) DEVICE — PROXIMATE RH ROTATING HEAD SKIN STAPLERS (35 WIDE) CONTAINS 35 STAINLESS STEEL STAPLES: Brand: PROXIMATE

## (undated) DEVICE — STRYKER PERFORMANCE SERIES SAGITTAL BLADE: Brand: STRYKER PERFORMANCE SERIES

## (undated) DEVICE — LAWSON - DRAPE STERI FLUORO 35X43IN

## (undated) DEVICE — TOTAL HIP CDS: Brand: MEDLINE INDUSTRIES, INC.

## (undated) DEVICE — GLOVE SUR 8 SENSICARE PI PIP CRM PWD F

## (undated) DEVICE — GOWN,SIRUS,FABRIC-REINFORCED,X-LARGE: Brand: MEDLINE

## (undated) DEVICE — STANDARD HYPODERMIC NEEDLE,POLYPROPYLENE HUB: Brand: MONOJECT

## (undated) DEVICE — HOOD: Brand: FLYTE

## (undated) DEVICE — GLOVE SUR 7.5 SENSICARE PI PIP CRM PWD F

## (undated) DEVICE — CONTAINER,SPECIMEN,PNEU TUBE,4OZ,OR STRL: Brand: MEDLINE

## (undated) DEVICE — DRAPE,U/SHT,SPLIT,FILM,60X84,STERILE: Brand: MEDLINE

## (undated) DEVICE — CEMENT MIXING SYSTEM WITH FEMORAL BREAKWAY NOZZLE: Brand: REVOLUTION

## (undated) DEVICE — NEPTUNE E-SEP SMOKE EVACUATION PENCIL, COATED, 70MM BLADE, PUSH BUTTON SWITCH: Brand: NEPTUNE E-SEP

## (undated) DEVICE — SLEEVE COMPR MD KNEE LEN SGL USE KENDALL SCD

## (undated) DEVICE — DRESSING ALG 3.5X10IN TAN CARBOXYMETHYL CELOS

## (undated) DEVICE — INTENDED TO AID IN THE PASSING OF SUTURES THROUGH BONE AND SOFT TISSUE DURING ORTHOPEDIC SURGERY: Brand: HOFFEE SUTURE RETRIEVER

## (undated) DEVICE — SYRINGE MED 30ML STD CLR PLAS LL TIP N CTRL

## (undated) DEVICE — SUT COAT VCRL 0 27IN CP-1 ABSRB UD 36MM 1/2

## (undated) DEVICE — 3M™ IOBAN™ 2 ANTIMICROBIAL INCISE DRAPE 6648EZ: Brand: IOBAN™ 2

## (undated) DEVICE — BANDAGE,COHESIVE,TAN,4X5YD,LF,STRL: Brand: MEDLINE

## (undated) DEVICE — SHEET,DRAPE,70X100,STERILE: Brand: MEDLINE

## (undated) DEVICE — SUT ETHBND XL 5 30IN V-37 NABSRB GRN 40MM 1/2

## (undated) DEVICE — GLOVE SUR 8 SENSICARE PI PIP GRN PWD F

## (undated) DEVICE — SUT MCRYL 2-0 27IN SH ABSRB UD 26MM 1/2 CIR

## (undated) DEVICE — GAUZE PK 2INX3YD COT RADPQ THROAT VAG

## (undated) DEVICE — PAD SACRAL SPAN AID

## (undated) DEVICE — 40409 ABDUCTION PILLOW MEDIUM: Brand: 40409 ABDUCTION PILLOW MEDIUM

## (undated) DEVICE — ELECTRODE ES L10.2CM BLDE L4IN EXT MPLR OPN

## (undated) NOTE — LETTER
[address]  [address]  Authorization for Surgical Operation and Procedure     Date:___________                                                                                                         Time:__________  I hereby authorize Surgeon(s):  Andrew Anand MD, my physician and his/her assistants (if applicable), which may include medical students, residents, and/or fellows, to perform the following surgical operation/ procedure and administer such anesthesia as may be determined necessary by my physician:  Operation/Procedure name (s) Procedure(s):  RIGHT HIP HEMIARTHROPLASTY/ BIPOLAR on Mojgan Jefferson   2.   I recognize that during the surgical operation/procedure, unforeseen conditions may necessitate additional or different procedures than those listed above.  I, therefore, further authorize and request that the above-named surgeon, assistants, or designees perform such procedures as are, in their judgment, necessary and desirable.    3.   My surgeon/physician has discussed prior to my surgery the potential benefits, risks and side effects of this procedure; the likelihood of achieving goals; and potential problems that might occur during recuperation.  They also discussed reasonable alternatives to the procedure, including risks, benefits, and side effects related to the alternatives and risks related to not receiving this procedure.  I have had all my questions answered and I acknowledge that no guarantee has been made as to the result that may be obtained.    4.   Should the need arise during my operation/procedure, which includes change of level of care prior to discharge, I also consent to the administration of blood and/or blood products.  Further, I understand that despite careful testing and screening of blood or blood products by collecting agencies, I may still be subject to ill effects as a result of receiving a blood transfusion and/or blood products.   The following are some, but not all, of the potential risks that can occur: fever and allergic reactions, hemolytic reactions, transmission of diseases such as Hepatitis, AIDS and Cytomegalovirus (CMV) and fluid overload.  In the event that I wish to have an autologous transfusion of my own blood, or a directed donor transfusion, I will discuss this with my physician.  Check only if Refusing Blood or Blood Products  I understand refusal of blood or blood products as deemed necessary by my physician may have serious consequences to my condition to include possible death. I hereby assume responsibility for my refusal and release the hospital, its personnel, and my physicians from any responsibility for the consequences of my refusal.          o  Refuse      5.   I authorize the use of any specimen, organs, tissues, body parts or foreign objects that may be removed from my body during the operation/procedure for diagnosis, research or teaching purposes and their subsequent disposal by hospital authorities.  I also authorize the release of specimen test results and/or written reports to my treating physician on the hospital medical staff or other referring or consulting physicians involved in my care, at the discretion of the Pathologist or my treating physician.    6.   I consent to the photographing or videotaping of the operations or procedures to be performed, including appropriate portions of my body for medical, scientific, or educational purposes, provided my identity is not revealed by the pictures or by descriptive texts accompanying them.  If the procedure has been photographed/videotaped, the surgeon will obtain the original picture, image, videotape or CD.  The hospital will not be responsible for storage, release or maintenance of the picture, image, tape or CD.    7.   I consent to the presence of a  or observers in the operating room as deemed necessary by my physician or their  designees.    8.   I recognize that in the event my procedure results in extended X-Ray/fluoroscopy time, I may develop a skin reaction.    9. If I have a Do Not Attempt Resuscitation (DNAR) order in place, that status will be suspended while in the operating room, procedural suite, and during the recovery period unless otherwise explicitly stated by me (or a person authorized to consent on my behalf). The surgeon or my attending physician will determine when the applicable recovery period ends for purposes of reinstating the DNAR order.  10. Patients having a sterilization procedure: I understand that if the procedure is successful the results will be permanent and it will therefore be impossible for me to inseminate, conceive, or bear children.  I also understand that the procedure is intended to result in sterility, although the result has not been guaranteed.   11. I acknowledge that my physician has explained sedation/analgesia administration to me including the risk and benefits I consent to the administration of sedation/analgesia as may be necessary or desirable in the judgment of my physician.    I CERTIFY THAT I HAVE READ AND FULLY UNDERSTAND THE ABOVE CONSENT TO OPERATION and/or OTHER PROCEDURE.    _________________________________________  __________________________________  Signature of Patient     Signature of Responsible Person         ___________________________________         Printed Name of Responsible Person           _________________________________                 Relationship to Patient  _________________________________________  ______________________________  Signature of Witness          Date  Time      Patient Name: Mojgan Jefferson     : 1945                 Printed: 2024     Medical Record #: [id number]                     Page [address] of [address] [place] [address]  [id number]    Consent for Anesthesia    I,  Mojgan Jefferson agree to be cared for by an anesthesiologist, who is specially trained to monitor me and give me medicine to put me to sleep or keep me comfortable during my procedure    I understand that my anesthesiologist is not an employee or agent of [facility] [employer]. He or she works for [employer].    As the patient asking for anesthesia services, I agree to:  Allow the anesthesiologist (anesthesia doctor) to give me medicine and do additional procedures as necessary. Some examples are: Starting or using an “IV” to give me medicine, fluids or blood during my procedure, and having a breathing tube placed to help me breathe when I’m asleep (intubation). In the event that my heart stops working properly, I understand that my anesthesiologist will make every effort to sustain my life, unless otherwise directed by [facility] Do Not Resuscitate documents.  Tell my anesthesia doctor before my procedure:  If I am pregnant.  The last time that I ate or drank.  All of the medicines I take (including prescriptions, herbal supplements, and pills I can buy without a prescription (including street drugs/illegal medications). Failure to inform my anesthesiologist about these medicines may increase my risk of anesthetic complications.  If I am allergic to anything or have had a reaction to anesthesia before.  I understand how the anesthesia medicine will help me (benefits).  I understand that with any type of anesthesia medicine there are risks:  The most common risks are: nausea, vomiting, sore throat, muscle soreness, damage to my eyes, mouth, or teeth (from breathing tube placement).  Rare risks include: remembering what happened during my procedure, allergic reactions to medications, injury to my airway, heart, lungs, vision, nerves, or muscles and in extremely rare instances death.  My doctor has explained to me other choices available to me for my care  (alternatives).  Pregnant Patients (“epidural”):  I understand that the risks of having an epidural (medicine given into my back to help control pain during labor), include itching, low blood pressure, difficulty urinating, headache or slowing of the baby’s heart. Very rare risks include infection, bleeding, seizure, irregular heart rhythms and nerve injury.  Regional Anesthesia (“spinal”, “epidural”, & “nerve blocks”):  I understand that rare but potential complications include headache, bleeding, infection, seizure, irregular heart rhythms, and nerve injury.    I can change my mind about having anesthesia services at any time before I get the medicine.    _____________________________________________________________________________  Patient (or Representative) Signature/Relationship to Patient  Date   Time    _____________________________________________________________________________   Name (if used)    Language/Organization   Time    _____________________________________________________________________________  Anesthesiologist Signature     Date   Time  I have discussed the procedure and information above with the patient (or patient’s representative) and answered their questions. The patient or their representative has agreed to have anesthesia services.    _____________________________________________________________________________  Witness        Date   Time  I have verified that the signature is that of the patient or patient’s representative, and that it was signed before the procedure  Patient Name: Mojgan Jefferson     : 1945                 Printed: 2024     Medical Record #: [id number]                     Page 2 of 2